# Patient Record
Sex: FEMALE | Race: ASIAN | NOT HISPANIC OR LATINO | Employment: PART TIME | URBAN - METROPOLITAN AREA
[De-identification: names, ages, dates, MRNs, and addresses within clinical notes are randomized per-mention and may not be internally consistent; named-entity substitution may affect disease eponyms.]

---

## 2016-01-29 LAB
HPV ABSTRACT: NORMAL
PAP-ABSTRACT: NORMAL

## 2017-01-04 ENCOUNTER — COMMUNICATION - HEALTHEAST (OUTPATIENT)
Dept: SURGERY | Facility: CLINIC | Age: 47
End: 2017-01-04

## 2017-01-05 ENCOUNTER — COMMUNICATION - HEALTHEAST (OUTPATIENT)
Dept: SURGERY | Facility: CLINIC | Age: 47
End: 2017-01-05

## 2017-01-05 DIAGNOSIS — G89.18 POST-OP PAIN: ICD-10-CM

## 2017-01-09 ENCOUNTER — OFFICE VISIT - HEALTHEAST (OUTPATIENT)
Dept: SURGERY | Facility: CLINIC | Age: 47
End: 2017-01-09

## 2017-01-09 ENCOUNTER — COMMUNICATION - HEALTHEAST (OUTPATIENT)
Dept: SURGERY | Facility: CLINIC | Age: 47
End: 2017-01-09

## 2017-01-09 DIAGNOSIS — Z48.89 POSTOPERATIVE VISIT: ICD-10-CM

## 2017-01-10 ENCOUNTER — HOSPITAL ENCOUNTER (OUTPATIENT)
Dept: CT IMAGING | Facility: HOSPITAL | Age: 47
Discharge: HOME | End: 2017-01-10
Attending: PHYSICIAN ASSISTANT

## 2017-01-22 ENCOUNTER — ANESTHESIA - HEALTHEAST (OUTPATIENT)
Dept: SURGERY | Facility: HOSPITAL | Age: 47
End: 2017-01-22

## 2017-01-22 ENCOUNTER — SURGERY - HEALTHEAST (OUTPATIENT)
Dept: SURGERY | Facility: HOSPITAL | Age: 47
End: 2017-01-22

## 2017-01-22 ASSESSMENT — MIFFLIN-ST. JEOR
SCORE: 1252.25
SCORE: 1245.17

## 2017-01-23 ENCOUNTER — HOME CARE/HOSPICE - HEALTHEAST (OUTPATIENT)
Dept: HOME HEALTH SERVICES | Facility: HOME HEALTH | Age: 47
End: 2017-01-23

## 2017-01-23 ENCOUNTER — COMMUNICATION - HEALTHEAST (OUTPATIENT)
Dept: FAMILY MEDICINE | Facility: CLINIC | Age: 47
End: 2017-01-23

## 2017-01-23 DIAGNOSIS — K29.70 GASTRITIS: ICD-10-CM

## 2017-01-23 ASSESSMENT — MIFFLIN-ST. JEOR: SCORE: 1279.92

## 2017-01-26 ASSESSMENT — MIFFLIN-ST. JEOR: SCORE: 1241.37

## 2017-01-29 ENCOUNTER — COMMUNICATION - HEALTHEAST (OUTPATIENT)
Dept: FAMILY MEDICINE | Facility: CLINIC | Age: 47
End: 2017-01-29

## 2017-01-30 ENCOUNTER — OFFICE VISIT - HEALTHEAST (OUTPATIENT)
Dept: SURGERY | Facility: CLINIC | Age: 47
End: 2017-01-30

## 2017-01-30 DIAGNOSIS — Z98.890 POST-OPERATIVE STATE: ICD-10-CM

## 2017-02-01 ENCOUNTER — COMMUNICATION - HEALTHEAST (OUTPATIENT)
Dept: FAMILY MEDICINE | Facility: CLINIC | Age: 47
End: 2017-02-01

## 2017-02-02 ENCOUNTER — COMMUNICATION - HEALTHEAST (OUTPATIENT)
Dept: SURGERY | Facility: CLINIC | Age: 47
End: 2017-02-02

## 2017-02-02 DIAGNOSIS — G89.18 POST-OPERATIVE PAIN: ICD-10-CM

## 2017-02-03 ENCOUNTER — OFFICE VISIT - HEALTHEAST (OUTPATIENT)
Dept: SURGERY | Facility: CLINIC | Age: 47
End: 2017-02-03

## 2017-02-03 DIAGNOSIS — Z98.890 POST-OPERATIVE STATE: ICD-10-CM

## 2017-02-03 DIAGNOSIS — N73.9 PELVIC ABSCESS IN FEMALE: ICD-10-CM

## 2017-02-09 ENCOUNTER — OFFICE VISIT - HEALTHEAST (OUTPATIENT)
Dept: FAMILY MEDICINE | Facility: CLINIC | Age: 47
End: 2017-02-09

## 2017-02-09 DIAGNOSIS — Z71.84 COUNSELING ABOUT TRAVEL: ICD-10-CM

## 2017-02-09 DIAGNOSIS — Z00.00 ROUTINE GENERAL MEDICAL EXAMINATION AT A HEALTH CARE FACILITY: ICD-10-CM

## 2017-02-09 DIAGNOSIS — E66.3 OVERWEIGHT: ICD-10-CM

## 2017-02-09 DIAGNOSIS — B18.1 CHRONIC HEPATITIS B VIRUS INFECTION (H): ICD-10-CM

## 2017-02-09 DIAGNOSIS — T81.40XA POSTOPERATIVE INFECTION, INITIAL ENCOUNTER: ICD-10-CM

## 2017-02-09 LAB
CHOLEST SERPL-MCNC: 138 MG/DL
CHOLEST SERPL-MCNC: 138 MG/DL
FASTING STATUS PATIENT QL REPORTED: YES
HDLC SERPL-MCNC: 43 MG/DL
HDLC SERPL-MCNC: 43 MG/DL
LDLC SERPL CALC-MCNC: 75 MG/DL
LDLC SERPL CALC-MCNC: 75 MG/DL
TRIGL SERPL-MCNC: 99 MG/DL
TRIGL SERPL-MCNC: 99 MG/DL

## 2017-02-09 ASSESSMENT — MIFFLIN-ST. JEOR: SCORE: 1212.56

## 2017-02-14 ENCOUNTER — AMBULATORY - HEALTHEAST (OUTPATIENT)
Dept: FAMILY MEDICINE | Facility: CLINIC | Age: 47
End: 2017-02-14

## 2017-02-14 ENCOUNTER — COMMUNICATION - HEALTHEAST (OUTPATIENT)
Dept: SCHEDULING | Facility: CLINIC | Age: 47
End: 2017-02-14

## 2017-02-14 DIAGNOSIS — Z00.00 HEALTH CARE MAINTENANCE: ICD-10-CM

## 2017-02-15 ENCOUNTER — COMMUNICATION - HEALTHEAST (OUTPATIENT)
Dept: FAMILY MEDICINE | Facility: CLINIC | Age: 47
End: 2017-02-15

## 2017-02-16 ENCOUNTER — COMMUNICATION - HEALTHEAST (OUTPATIENT)
Dept: SCHEDULING | Facility: CLINIC | Age: 47
End: 2017-02-16

## 2017-03-21 ENCOUNTER — RADIANT APPOINTMENT (OUTPATIENT)
Dept: MAMMOGRAPHY | Facility: CLINIC | Age: 47
End: 2017-03-21
Payer: COMMERCIAL

## 2017-03-21 DIAGNOSIS — Z12.31 VISIT FOR SCREENING MAMMOGRAM: ICD-10-CM

## 2017-03-21 PROCEDURE — G0202 SCR MAMMO BI INCL CAD: HCPCS | Mod: TC

## 2017-03-31 ENCOUNTER — RECORDS - HEALTHEAST (OUTPATIENT)
Dept: ADMINISTRATIVE | Facility: OTHER | Age: 47
End: 2017-03-31

## 2017-03-31 ENCOUNTER — TRANSFERRED RECORDS (OUTPATIENT)
Dept: HEALTH INFORMATION MANAGEMENT | Facility: CLINIC | Age: 47
End: 2017-03-31

## 2018-01-21 ENCOUNTER — HEALTH MAINTENANCE LETTER (OUTPATIENT)
Age: 48
End: 2018-01-21

## 2018-02-19 ENCOUNTER — OFFICE VISIT (OUTPATIENT)
Dept: PEDIATRICS | Facility: CLINIC | Age: 48
End: 2018-02-19
Payer: COMMERCIAL

## 2018-02-19 VITALS
TEMPERATURE: 97.9 F | SYSTOLIC BLOOD PRESSURE: 114 MMHG | HEART RATE: 62 BPM | OXYGEN SATURATION: 98 % | BODY MASS INDEX: 29.19 KG/M2 | DIASTOLIC BLOOD PRESSURE: 68 MMHG | HEIGHT: 62 IN | WEIGHT: 158.6 LBS

## 2018-02-19 DIAGNOSIS — E56.9 VITAMIN DEFICIENCY: ICD-10-CM

## 2018-02-19 DIAGNOSIS — E66.3 OVERWEIGHT (BMI 25.0-29.9): ICD-10-CM

## 2018-02-19 DIAGNOSIS — B18.1 CHRONIC HEPATITIS B (H): ICD-10-CM

## 2018-02-19 DIAGNOSIS — E55.9 VITAMIN D DEFICIENCY: ICD-10-CM

## 2018-02-19 DIAGNOSIS — Z12.31 VISIT FOR SCREENING MAMMOGRAM: ICD-10-CM

## 2018-02-19 DIAGNOSIS — Z00.00 ROUTINE GENERAL MEDICAL EXAMINATION AT A HEALTH CARE FACILITY: Primary | ICD-10-CM

## 2018-02-19 DIAGNOSIS — K64.4 EXTERNAL HEMORRHOIDS: ICD-10-CM

## 2018-02-19 DIAGNOSIS — K29.70 GASTRITIS WITHOUT BLEEDING, UNSPECIFIED CHRONICITY, UNSPECIFIED GASTRITIS TYPE: ICD-10-CM

## 2018-02-19 DIAGNOSIS — Z12.11 SCREENING FOR COLON CANCER: ICD-10-CM

## 2018-02-19 DIAGNOSIS — Z12.39 SCREENING FOR BREAST CANCER: ICD-10-CM

## 2018-02-19 DIAGNOSIS — R10.84 ABDOMINAL PAIN, GENERALIZED: ICD-10-CM

## 2018-02-19 DIAGNOSIS — Z86.39 HISTORY OF IRON DEFICIENCY: ICD-10-CM

## 2018-02-19 LAB
ALBUMIN SERPL-MCNC: 3.7 G/DL (ref 3.4–5)
ALP SERPL-CCNC: 65 U/L (ref 40–150)
ALT SERPL W P-5'-P-CCNC: 17 U/L (ref 0–50)
ANION GAP SERPL CALCULATED.3IONS-SCNC: 8 MMOL/L (ref 3–14)
AST SERPL W P-5'-P-CCNC: 16 U/L (ref 0–45)
BILIRUB SERPL-MCNC: 0.7 MG/DL (ref 0.2–1.3)
BUN SERPL-MCNC: 13 MG/DL (ref 7–30)
CALCIUM SERPL-MCNC: 9 MG/DL (ref 8.5–10.1)
CHLORIDE SERPL-SCNC: 108 MMOL/L (ref 94–109)
CHOLEST SERPL-MCNC: 188 MG/DL
CO2 SERPL-SCNC: 24 MMOL/L (ref 20–32)
CREAT SERPL-MCNC: 0.6 MG/DL (ref 0.52–1.04)
DEPRECATED CALCIDIOL+CALCIFEROL SERPL-MC: 13 UG/L (ref 20–75)
GFR SERPL CREATININE-BSD FRML MDRD: >90 ML/MIN/1.7M2
GLUCOSE SERPL-MCNC: 99 MG/DL (ref 70–99)
HDLC SERPL-MCNC: 61 MG/DL
LDLC SERPL CALC-MCNC: 110 MG/DL
NONHDLC SERPL-MCNC: 127 MG/DL
POTASSIUM SERPL-SCNC: 3.8 MMOL/L (ref 3.4–5.3)
PROT SERPL-MCNC: 7.3 G/DL (ref 6.8–8.8)
SODIUM SERPL-SCNC: 140 MMOL/L (ref 133–144)
TRIGL SERPL-MCNC: 84 MG/DL

## 2018-02-19 PROCEDURE — 80061 LIPID PANEL: CPT | Performed by: INTERNAL MEDICINE

## 2018-02-19 PROCEDURE — 99386 PREV VISIT NEW AGE 40-64: CPT | Performed by: INTERNAL MEDICINE

## 2018-02-19 PROCEDURE — 82306 VITAMIN D 25 HYDROXY: CPT | Performed by: INTERNAL MEDICINE

## 2018-02-19 PROCEDURE — 36415 COLL VENOUS BLD VENIPUNCTURE: CPT | Performed by: INTERNAL MEDICINE

## 2018-02-19 PROCEDURE — 80053 COMPREHEN METABOLIC PANEL: CPT | Performed by: INTERNAL MEDICINE

## 2018-02-19 RX ORDER — LORATADINE 10 MG/1
10 TABLET ORAL
Status: ON HOLD | COMMUNITY
End: 2018-04-03

## 2018-02-19 RX ORDER — ANTIPRURITIC (ANTI-ITCH) 1 G/100G
OINTMENT TOPICAL 2 TIMES DAILY PRN
Qty: 30 G | Refills: 1 | Status: SHIPPED | OUTPATIENT
Start: 2018-02-19 | End: 2018-02-19

## 2018-02-19 RX ORDER — HYDROCORTISONE 2.5 %
CREAM (GRAM) TOPICAL 2 TIMES DAILY
Status: ON HOLD | COMMUNITY
End: 2018-04-03

## 2018-02-19 NOTE — MR AVS SNAPSHOT
After Visit Summary   2/19/2018    Mirta Sims    MRN: [id number]           Patient Information     Date Of Birth          1970        Visit Information        Provider Department      2/19/2018 8:10 AM Nayan Holloway MD [facility]        Today's Diagnoses     Routine general medical examination at a health care facility    -  1    Screening for breast cancer        Screening for colon cancer        Vitamin deficiency        Overweight (BMI 25.0-29.9)        External hemorrhoids          Care Instructions    Nice to meet you!    Schedule mammogram after 3/31/2018    Schedule colonoscopy after 1970 (if everything is normal this is only once every 10 years!)    I will send your labs via MyChart or letter.      Preventive Health Recommendations  Female Ages 40 to 49    Yearly exam:     See your health care provider every year in order to  1. Review health changes.   2. Discuss preventive care.    3. Review your medicines if your doctor prescribed any.      Get a Pap test every three years (unless you have an abnormal result and your provider advises testing more often).      If you get Pap tests with HPV test, you only need to test every 5 years, unless you have an abnormal result. You do not need a Pap test if your uterus was removed (hysterectomy) and you have not had cancer.      You should be tested each year for STDs (sexually transmitted diseases), if you're at risk.       Ask your doctor if you should have a mammogram.      Have a colonoscopy (test for colon cancer) if someone in your family has had colon cancer or polyps before age 50.       Have a cholesterol test every 5 years.       Have a diabetes test (fasting glucose) after age 45. If you are at risk for diabetes, you should have this test every 3 years.    Shots: Get a flu shot each year. Get a tetanus shot every 10 years.     Nutrition:     Eat at least 5 servings of fruits and vegetables each day.    Eat  whole-grain bread, whole-wheat pasta and brown rice instead of white grains and rice.    Talk to your provider about Calcium and Vitamin D.     Lifestyle    Exercise at least 150 minutes a week (an average of 30 minutes a day, 5 days a week). This will help you control your weight and prevent disease.    Limit alcohol to one drink per day.    No smoking.     Wear sunscreen to prevent skin cancer.    See your dentist every six months for an exam and cleaning.          Follow-ups after your visit        Additional Services     GASTROENTEROLOGY ADULT REF PROCEDURE ONLY       Schedule after [birth year]/18/[birth year]    Last Lab Result: No results found for: CR  Body mass index is 29.48 kg/(m^2).     Needed:  No  Language:  English    Patient will be contacted to schedule procedure.     Please be aware that coverage of these services is subject to the terms and limitations of your health insurance plan.  Call member services at your health plan with any benefit or coverage questions.  Any procedures must be performed at a [place] facility OR coordinated by your clinic's referral office.    Please bring the following with you to your appointment:    (1) Any X-Rays, CTs or MRIs which have been performed.  Contact the facility where they were done to arrange for  prior to your scheduled appointment.    (2) List of current medications   (3) This referral request   (4) Any documents/labs given to you for this referral                  Follow-up notes from your care team     Return in about 1 year (around 2/19/2019) for Wellness Visit.      Future tests that were ordered for you today     Open Future Orders        Priority Expected Expires Ordered    MA Screening Digital Bilateral Routine  2/19/2019 2/19/2018            Who to contact     If you have questions or need follow up information about today's clinic visit or your schedule please contact [facility] [place] directly at [phone].  Normal or non-critical lab  "and imaging results will be communicated to you by MyChart, letter or phone within 4 business days after the clinic has received the results. If you do not hear from us within 7 days, please contact the clinic through Voxoundt or phone. If you have a critical or abnormal lab result, we will notify you by phone as soon as possible.  Submit refill requests through Orthohub or call your pharmacy and they will forward the refill request to us. Please allow 3 business days for your refill to be completed.          Additional Information About Your Visit        [employer] Information     Orth[employer]ub lets you send messages to your doctor, view your test results, renew your prescriptions, schedule appointments and more. To sign up, go to [email or web address] . Click on \"Log in\" on the left side of the screen, which will take you to the Welcome page. Then click on \"Sign up Now\" on the right side of the page.     You will be asked to enter the access code listed below, as well as some personal information. Please follow the directions to create your username and password.     Your access code is: [email or web address]  Expires: 2018  8:43 AM     Your access code will  in 90 days. If you need help or a new code, please call your [place] clinic or [phone].        Care EveryWhere ID     This is your Care EveryWhere ID. This could be used by other organizations to access your [place] medical records  [id number]        Your Vitals Were     Pulse Temperature Height Pulse Oximetry BMI (Body Mass Index)       62 97.9  F (36.6  C) (Oral) 5' 1.5\" (1.562 m) 98% 29.48 kg/m2        Blood Pressure from Last 3 Encounters:   18 114/68    Weight from Last 3 Encounters:   18 158 lb 9.6 oz (71.9 kg)              We Performed the Following     Comprehensive metabolic panel     GASTROENTEROLOGY ADULT REF PROCEDURE ONLY     Lipid panel reflex to direct LDL Fasting     Vitamin D Deficiency          Today's Medication Changes        "   These changes are accurate as of 2/19/18  8:44 AM.  If you have any questions, ask your nurse or doctor.               Start taking these medicines.        Dose/Directions    Hydrocortisone Acetate 1 % Oint   Used for:  External hemorrhoids   Started by:  Nayan Holloway MD        Externally apply topically 2 times daily as needed   Quantity:  30 g   Refills:  1            Where to get your medicines      These medications were sent to [place] [address] [facility] [place] - [address]  [address], [place] [address]     Phone:  [phone]     Hydrocortisone Acetate 1 % Oint                Primary Care Provider Office Phone # Fax #    Sarahsiena Ronna Grier [phone] [address] [place] [address]        Equal Access to Services     [facility]: Hadii silvia meier hadlissao Somarkie, waaxda luqadaha, qaybta kaalmada adeegyada, gege dos santos . So [facility] [phone].    ATENCIÓN: Si habla español, tiene a dowell disposición servicios gratuitos de asistencia lingüística. Llame al [phone].    We comply with applicable federal civil rights laws and [place] laws. We do not discriminate on the basis of race, color, national origin, age, disability, sex, sexual orientation, or gender identity.            Thank you!     Thank you for choosing [facility]  for your care. Our goal is always to provide you with excellent care. Hearing back from our patients is one way we can continue to improve our services. Please take a few minutes to complete the written survey that you may receive in the mail after your visit with us. Thank you!             Your Updated Medication List - Protect others around you: Learn how to safely use, store and throw away your medicines at www.disposemymeds.org.          This list is accurate as of 2/19/18  8:44 AM.  Always use your most recent med list.                   Brand Name Dispense Instructions for  use Diagnosis    hydrocortisone 2.5 % cream      2 times daily        Hydrocortisone Acetate 1 % Oint     30 g    Externally apply topically 2 times daily as needed    External hemorrhoids       loratadine 10 MG tablet    CLARITIN     Take 10 mg by mouth        omeprazole 20 MG CR capsule    priLOSEC     TAKE 1 CAPSULE (20 MG TOTAL) BY MOUTH DAILY.

## 2018-02-19 NOTE — PATIENT INSTRUCTIONS
Nice to meet you!    Schedule mammogram after 3/31/2018    Schedule colonoscopy after 1970 (if everything is normal this is only once every 10 years!)    I will send your labs via Selftradehart or letter.      Preventive Health Recommendations  Female Ages 40 to 49    Yearly exam:     See your health care provider every year in order to  1. Review health changes.   2. Discuss preventive care.    3. Review your medicines if your doctor prescribed any.      Get a Pap test every three years (unless you have an abnormal result and your provider advises testing more often).      If you get Pap tests with HPV test, you only need to test every 5 years, unless you have an abnormal result. You do not need a Pap test if your uterus was removed (hysterectomy) and you have not had cancer.      You should be tested each year for STDs (sexually transmitted diseases), if you're at risk.       Ask your doctor if you should have a mammogram.      Have a colonoscopy (test for colon cancer) if someone in your family has had colon cancer or polyps before age 50.       Have a cholesterol test every 5 years.       Have a diabetes test (fasting glucose) after age 45. If you are at risk for diabetes, you should have this test every 3 years.    Shots: Get a flu shot each year. Get a tetanus shot every 10 years.     Nutrition:     Eat at least 5 servings of fruits and vegetables each day.    Eat whole-grain bread, whole-wheat pasta and brown rice instead of white grains and rice.    Talk to your provider about Calcium and Vitamin D.     Lifestyle    Exercise at least 150 minutes a week (an average of 30 minutes a day, 5 days a week). This will help you control your weight and prevent disease.    Limit alcohol to one drink per day.    No smoking.     Wear sunscreen to prevent skin cancer.    See your dentist every six months for an exam and cleaning.

## 2018-02-19 NOTE — PROGRESS NOTES
SUBJECTIVE:   CC: Mirta Sims is an 47 year old woman who presents for preventive health visit.     Physical   Annual:     Getting at least 3 servings of Calcium per day::  Yes    Bi-annual eye exam::  Yes    Dental care twice a year::  Yes    Sleep apnea or symptoms of sleep apnea::  None    Diet::  Low salt and Gluten-free/reduced    Frequency of exercise::  2-3 days/week    Duration of exercise::  15-30 minutes    Taking medications regularly::  Yes    Medication side effects::  Not applicable    Additional concerns today::  No          # Hepatitis B  - Went to [facility] - was following every 2 years.    # Intermittent abdominal pain after eating  - Not daily  - No weight loss  - No stool changes    # Hemorrhoids  - Sometimes itch  - Uses steroid ointment a few days a month    # Gyn  Not had a period in 3 months  No hot flashes  No chance she could be pregnant (Essure)    Abstract: abstract Pap 1/29/16, Mammo 12/15/09-3/31/17 and Lipids 2/09/17     Chart Reviewed in CareEverywhere  # chronic hepatitis B  - Follows with [facility] per patient - will get NIMESH  - Hep B vial DNA positive in 2011    Other problems listed in chart:  Gastritis - Tums and Prilosec on med list  Chronic abdominal pain  Anemia   - post operatively anemic but in 2016 had normal Hgb  - history of iron deficiency anemia, saw Hematology, likely 2/2 heavy menstrual bleeding.   Carpal tunnel syndrome    Today's PHQ-2 Score:   PHQ-2 ( 1999 [employer]) 2/19/2018   Q1: Little interest or pleasure in doing things 1   Q2: Feeling down, depressed or hopeless 0   PHQ-2 Score 1   Q1: Little interest or pleasure in doing things Several days   Q2: Feeling down, depressed or hopeless Not at all   PHQ-2 Score 1     Abuse: Current or Past(Physical, Sexual or Emotional)- No  Do you feel safe in your environment - Yes    Social History   Substance Use Topics     Smoking status: Never Smoker     Smokeless tobacco: Never Used     Alcohol use Yes      Comment: occasional       Alcohol Use 2018   If you drink alcohol, do you typically have greater than 3 drinks per day OR greater than 7 drinks per week?   No     Reviewed orders with patient.  Reviewed health maintenance and updated orders accordingly - Yes  Patient Active Problem List   Diagnosis     Chronic hepatitis B (H)     Past Surgical History:   Procedure Laterality Date     APPENDECTOMY  2016     CHOLECYSTECTOMY       HC HYSTEROS W PERMANENT FALLOPAIN IMPLANT       LAPAROTOMY EXPLORATORY  2017    Procedure: LAPAROSCOPY CONVERTED TO EXPLORATORY LAPAROTOMY WITH EVACUATION OF RETROCOLONIC ABSCESS X 2; Surgeon: Johnnie Dinero DO; Location: [facility] OR; Service: General     MESH (IMPLANTABLE)  2017    Film Anti Adhesion Sepra 4301-02 - Sn.A. Abdomen       Social History   Substance Use Topics     Smoking status: Never Smoker     Smokeless tobacco: Never Used     Alcohol use Yes      Comment: occasional      Family History   Problem Relation Age of Onset     Gout Mother      Hypertension Mother      92 in 2018     Other - See Comments Father       2 years ago, in [place], fever?     Other - See Comments Brother      Heart transplant         Current Outpatient Prescriptions   Medication Sig Dispense Refill     omeprazole (PRILOSEC) 20 MG CR capsule TAKE 1 CAPSULE (20 MG TOTAL) BY MOUTH DAILY.       loratadine (CLARITIN) 10 MG tablet Take 10 mg by mouth       hydrocortisone 2.5 % cream 2 times daily       hydrocortisone (ANUSOL-HC) 2.5 % cream Place rectally 2 times daily 30 g 1     Allergies   Allergen Reactions     Strawberry Swelling     Tomato Swelling     Azithromycin Rash     Annotation: on face         Patient over age 50, mutual decision to screen reflected in health maintenance.    Pertinent mammograms are reviewed under the imaging tab.  History of abnormal Pap smear: NO - age 30-65 PAP every 5 years with negative HPV co-testing recommended    Reviewed and updated as needed this visit by  "clinical staff  Tobacco  Allergies  Meds  Problems  Med Hx  Surg Hx  Fam Hx  Soc Hx          Reviewed and updated as needed this visit by Provider  Allergies  Meds  Problems        Past Medical History:   Diagnosis Date     Chronic hepatitis B (H)      Perforated appendicitis      Post-operative wound abscess       Past Surgical History:   Procedure Laterality Date     APPENDECTOMY  12/26/2016     CHOLECYSTECTOMY       HC HYSTEROS W PERMANENT FALLOPAIN IMPLANT       LAPAROTOMY EXPLORATORY  01/22/2017    Procedure: LAPAROSCOPY CONVERTED TO EXPLORATORY LAPAROTOMY WITH EVACUATION OF RETROCOLONIC ABSCESS X 2; Surgeon: Johnnie Dinero DO; [facility] Service: General     MESH (IMPLANTABLE)  01/22/2017    Film Anti Adhesion Sepra 4301-02 - Sn.A. Abdomen       Review of Systems  C: NEGATIVE for fever, chills, change in weight  I: NEGATIVE for worrisome rashes, moles or lesions  E: NEGATIVE for vision changes or irritation  ENT: NEGATIVE for ear, mouth and throat problems  R: NEGATIVE for significant cough or SOB  B: NEGATIVE for masses, tenderness or discharge  CV: NEGATIVE for chest pain, palpitations or peripheral edema  GI: NEGATIVE for nausea, abdominal pain, heartburn, or change in bowel habits  : NEGATIVE for unusual urinary or vaginal symptoms. No vaginal bleeding.  M: NEGATIVE for significant arthralgias or myalgia  N: NEGATIVE for weakness, dizziness or paresthesias  P: NEGATIVE for changes in mood or affect      OBJECTIVE:   /68 (BP Location: Right arm, Patient Position: Sitting, Cuff Size: Adult Regular)  Pulse 62  Temp 97.9  F (36.6  C) (Oral)  Ht 5' 1.5\" (1.562 m)  Wt 158 lb 9.6 oz (71.9 kg)  SpO2 98%  BMI 29.48 kg/m2  Physical Exam  GENERAL: healthy, alert and no distress, overweight  EYES: Eyes grossly normal to inspection, PERRL and conjunctivae and sclerae normal  HENT: ear canals and TM's normal, nose and mouth without ulcers or lesions  NECK: no adenopathy, no " asymmetry, masses, or scars and thyroid normal to palpation  RESP: lungs clear to auscultation - no rales, rhonchi or wheezes  BREAST: normal without masses, tenderness or nipple discharge and no palpable axillary masses or adenopathy  CV: regular rate and rhythm, normal S1 S2, no S3 or S4, no murmur, click or rub, no peripheral edema and peripheral pulses strong  ABDOMEN: soft, nontender, no hepatosplenomegaly, no masses and bowel sounds normal, well healed abdominal scars  MS: no gross musculoskeletal defects noted, no edema  SKIN: no suspicious lesions or rashes  NEURO: Normal strength and tone, mentation intact and speech normal  PSYCH: mentation appears normal, affect normal/bright    ASSESSMENT/PLAN:   1. Routine general medical examination at a health care facility  Overall doing well.  Here to establish care with me.  See discussion below.    2. Screening for breast cancer  Has been doing annual screenings.  No abnormals prior. Will have next mammogram done after 3/31/2018.  - MA Screening Digital Bilateral; Future    3. Screening for colon cancer  Reviewed screening guidelines.  She turns 48 in a few months so we will have her complete this after her 48th birthday.  - GASTROENTEROLOGY ADULT REF PROCEDURE ONLY    4. Vitamin deficiency  Discussed bone health.  - Vitamin D Deficiency    5. Overweight (BMI 25.0-29.9)  Reviewed diet and exercise.  She exercises a few times a week in her apartment complex.  Recommended that she get her heart rate of 3-4 times a week.  Also discussed diet.  She eats a significant amount of rice.  Discussed changing all white rice to brown rice.  - Comprehensive metabolic panel  - Lipid panel reflex to direct LDL Fasting    6. External hemorrhoids  Not currently active but does use hydrocortisone cream a few days a month when she has itching.  - hydrocortisone (ANUSOL-HC) 2.5 % cream; Place rectally 2 times daily  Dispense: 30 g; Refill: 1    7. Chronic hepatitis B (H)  Has been  "following with [facility].  She is not sure when she is due to follow-up with them.  She felt an NIMEHS.  I will review her records we will get back to her regarding follow-up plans.    8. Abdominal pain, generalized  May be related to her history of gastritis.  She does report he gets better with Tums.  Encouraged her to keep a journal of when the pain occurs, where it is, in which she is eating.  I invited her to come back to discuss further.    9. History of iron deficiency anemia  Per chart review- saw Heme in the past. Thought 2/2 heavy menses. In 2016 had normal Hgb, more recent values are post-operative. No complaints today - will recheck CBC with next blood draw.    COUNSELING:  Reviewed preventive health counseling, as reflected in patient instructions       Regular exercise       Healthy diet/nutrition       Immunizations - UTD       Osteoporosis Prevention/Bone Health       Colon cancer screening       HIV screeninx in teen years, 1x in adult years, and at intervals if high risk - NEGATIVE 12/3/2010       (Summer)menopause management     reports that she has never smoked. She has never used smokeless tobacco.    Estimated body mass index is 29.48 kg/(m^2) as calculated from the following:    Height as of this encounter: 5' 1.5\" (1.562 m).    Weight as of this encounter: 158 lb 9.6 oz (71.9 kg).   Weight management plan: Discussed healthy diet and exercise guidelines and patient will follow up in 12 months in clinic to re-evaluate.    Counseling Resources:  ATP IV Guidelines  Pooled Cohorts Equation Calculator  Breast Cancer Risk Calculator  FRAX Risk Assessment  ICSI Preventive Guidelines  Dietary Guidelines for Americans, 2010  USDA's MyPlate  ASA Prophylaxis  Lung CA Screening    Nayan Holloway MD  [facility] [place]  "

## 2018-02-20 RX ORDER — ERGOCALCIFEROL 1.25 MG/1
50000 CAPSULE, LIQUID FILLED ORAL
Qty: 8 CAPSULE | Refills: 0 | Status: SHIPPED | OUTPATIENT
Start: 2018-02-20 | End: 2019-02-22

## 2018-02-21 ENCOUNTER — TELEPHONE (OUTPATIENT)
Dept: PEDIATRICS | Facility: CLINIC | Age: 48
End: 2018-02-21

## 2018-02-21 NOTE — TELEPHONE ENCOUNTER
Patient calls.  She is advised and in agreement.  She will call them for an appointment in the spring when she turns 48.  Lucila Ovalle RN  Message handled by Nurse Triage.

## 2018-02-21 NOTE — TELEPHONE ENCOUNTER
Called patient per provider, was unable to speak with patient. Left VM to call the clinic back.     Vivienne Newsome CMA  February 21, 2018, 9:20 AM

## 2018-04-03 ENCOUNTER — RECORDS - HEALTHEAST (OUTPATIENT)
Dept: ADMINISTRATIVE | Facility: OTHER | Age: 48
End: 2018-04-03

## 2018-04-03 ENCOUNTER — APPOINTMENT (OUTPATIENT)
Dept: CT IMAGING | Facility: CLINIC | Age: 48
End: 2018-04-03
Attending: INTERNAL MEDICINE
Payer: COMMERCIAL

## 2018-04-03 ENCOUNTER — SURGERY (OUTPATIENT)
Age: 48
End: 2018-04-03
Payer: COMMERCIAL

## 2018-04-03 ENCOUNTER — ANESTHESIA EVENT (OUTPATIENT)
Dept: SURGERY | Facility: CLINIC | Age: 48
End: 2018-04-03
Payer: COMMERCIAL

## 2018-04-03 ENCOUNTER — HOSPITAL ENCOUNTER (INPATIENT)
Facility: CLINIC | Age: 48
LOS: 2 days | Discharge: HOME | End: 2018-04-05
Attending: INTERNAL MEDICINE | Admitting: SURGERY
Payer: COMMERCIAL

## 2018-04-03 ENCOUNTER — ANESTHESIA (OUTPATIENT)
Dept: SURGERY | Facility: CLINIC | Age: 48
End: 2018-04-03
Payer: COMMERCIAL

## 2018-04-03 DIAGNOSIS — Z90.49 S/P LAPAROSCOPIC APPENDECTOMY: Primary | ICD-10-CM

## 2018-04-03 DIAGNOSIS — K35.30 ACUTE APPENDICITIS WITH LOCALIZED PERITONITIS: ICD-10-CM

## 2018-04-03 PROBLEM — K35.33 ACUTE APPENDICITIS WITH PERITONEAL ABSCESS: Status: ACTIVE | Noted: 2018-04-03

## 2018-04-03 PROBLEM — K35.80 ACUTE APPENDICITIS: Status: ACTIVE | Noted: 2018-04-03

## 2018-04-03 LAB
ALBUMIN SERPL-MCNC: 3.5 G/DL (ref 3.4–5)
ALBUMIN UR-MCNC: NEGATIVE MG/DL
ALP SERPL-CCNC: 60 U/L (ref 40–150)
ALT SERPL W P-5'-P-CCNC: 16 U/L (ref 0–50)
ANION GAP SERPL CALCULATED.3IONS-SCNC: 5 MMOL/L (ref 3–14)
APPEARANCE UR: CLEAR
AST SERPL W P-5'-P-CCNC: 16 U/L (ref 0–45)
BASOPHILS # BLD AUTO: 0 10E9/L (ref 0–0.2)
BASOPHILS NFR BLD AUTO: 0.2 %
BILIRUB SERPL-MCNC: 1.5 MG/DL (ref 0.2–1.3)
BILIRUB UR QL STRIP: NEGATIVE
BUN SERPL-MCNC: 7 MG/DL (ref 7–30)
CALCIUM SERPL-MCNC: 8.5 MG/DL (ref 8.5–10.1)
CHLORIDE SERPL-SCNC: 105 MMOL/L (ref 94–109)
CO2 SERPL-SCNC: 26 MMOL/L (ref 20–32)
COLOR UR AUTO: YELLOW
CREAT SERPL-MCNC: 0.56 MG/DL (ref 0.52–1.04)
DIFFERENTIAL METHOD BLD: ABNORMAL
EOSINOPHIL # BLD AUTO: 0 10E9/L (ref 0–0.7)
EOSINOPHIL NFR BLD AUTO: 0.2 %
ERYTHROCYTE [DISTWIDTH] IN BLOOD BY AUTOMATED COUNT: 16.3 % (ref 10–15)
GFR SERPL CREATININE-BSD FRML MDRD: >90 ML/MIN/1.7M2
GLUCOSE SERPL-MCNC: 105 MG/DL (ref 70–99)
GLUCOSE UR STRIP-MCNC: NEGATIVE MG/DL
HCG UR QL: NEGATIVE
HCT VFR BLD AUTO: 40 % (ref 35–47)
HGB BLD-MCNC: 13 G/DL (ref 11.7–15.7)
HGB UR QL STRIP: NEGATIVE
HYALINE CASTS #/AREA URNS LPF: 1 /LPF (ref 0–2)
IMM GRANULOCYTES # BLD: 0.1 10E9/L (ref 0–0.4)
IMM GRANULOCYTES NFR BLD: 0.4 %
KETONES UR STRIP-MCNC: 20 MG/DL
LEUKOCYTE ESTERASE UR QL STRIP: NEGATIVE
LIPASE SERPL-CCNC: 210 U/L (ref 73–393)
LYMPHOCYTES # BLD AUTO: 1.4 10E9/L (ref 0.8–5.3)
LYMPHOCYTES NFR BLD AUTO: 10 %
MCH RBC QN AUTO: 26.3 PG (ref 26.5–33)
MCHC RBC AUTO-ENTMCNC: 32.5 G/DL (ref 31.5–36.5)
MCV RBC AUTO: 81 FL (ref 78–100)
MONOCYTES # BLD AUTO: 1.1 10E9/L (ref 0–1.3)
MONOCYTES NFR BLD AUTO: 8.1 %
NEUTROPHILS # BLD AUTO: 11.2 10E9/L (ref 1.6–8.3)
NEUTROPHILS NFR BLD AUTO: 81.1 %
NITRATE UR QL: NEGATIVE
NRBC # BLD AUTO: 0 10*3/UL
NRBC BLD AUTO-RTO: 0 /100
PH UR STRIP: 8 PH (ref 5–7)
PLATELET # BLD AUTO: 136 10E9/L (ref 150–450)
POTASSIUM SERPL-SCNC: 3.7 MMOL/L (ref 3.4–5.3)
PROT SERPL-MCNC: 7.2 G/DL (ref 6.8–8.8)
RBC # BLD AUTO: 4.94 10E12/L (ref 3.8–5.2)
RBC #/AREA URNS AUTO: <1 /HPF (ref 0–2)
SODIUM SERPL-SCNC: 136 MMOL/L (ref 133–144)
SOURCE: ABNORMAL
SP GR UR STRIP: 1.01 (ref 1–1.03)
SQUAMOUS #/AREA URNS AUTO: 1 /HPF (ref 0–1)
UROBILINOGEN UR STRIP-MCNC: 4 MG/DL (ref 0–2)
WBC # BLD AUTO: 13.8 10E9/L (ref 4–11)
WBC #/AREA URNS AUTO: <1 /HPF (ref 0–5)

## 2018-04-03 PROCEDURE — 36000056 ZZH SURGERY LEVEL 3 1ST 30 MIN: Performed by: SURGERY

## 2018-04-03 PROCEDURE — 12000000 ZZH R&B MED SURG/OB

## 2018-04-03 PROCEDURE — 25000566 ZZH SEVOFLURANE, EA 15 MIN: Performed by: SURGERY

## 2018-04-03 PROCEDURE — 96376 TX/PRO/DX INJ SAME DRUG ADON: CPT

## 2018-04-03 PROCEDURE — 88304 TISSUE EXAM BY PATHOLOGIST: CPT | Mod: 26 | Performed by: SURGERY

## 2018-04-03 PROCEDURE — 81025 URINE PREGNANCY TEST: CPT | Performed by: INTERNAL MEDICINE

## 2018-04-03 PROCEDURE — 96375 TX/PRO/DX INJ NEW DRUG ADDON: CPT

## 2018-04-03 PROCEDURE — 71000012 ZZH RECOVERY PHASE 1 LEVEL 1 FIRST HR: Performed by: SURGERY

## 2018-04-03 PROCEDURE — 0DTJ4ZZ RESECTION OF APPENDIX, PERCUTANEOUS ENDOSCOPIC APPROACH: ICD-10-PCS | Performed by: SURGERY

## 2018-04-03 PROCEDURE — 88304 TISSUE EXAM BY PATHOLOGIST: CPT | Performed by: SURGERY

## 2018-04-03 PROCEDURE — 40000936 ZZH STATISTIC OUTPATIENT (NON-OBS) NIGHT

## 2018-04-03 PROCEDURE — 25000128 H RX IP 250 OP 636: Performed by: INTERNAL MEDICINE

## 2018-04-03 PROCEDURE — 44970 LAPAROSCOPY APPENDECTOMY: CPT | Performed by: SURGERY

## 2018-04-03 PROCEDURE — 25000132 ZZH RX MED GY IP 250 OP 250 PS 637: Performed by: SURGERY

## 2018-04-03 PROCEDURE — 25000125 ZZHC RX 250: Performed by: NURSE ANESTHETIST, CERTIFIED REGISTERED

## 2018-04-03 PROCEDURE — 85025 COMPLETE CBC W/AUTO DIFF WBC: CPT | Performed by: INTERNAL MEDICINE

## 2018-04-03 PROCEDURE — 25000125 ZZHC RX 250: Performed by: SURGERY

## 2018-04-03 PROCEDURE — 40000935 ZZH STATISTIC OUTPATIENT (NON-OBS) EVE

## 2018-04-03 PROCEDURE — 25800025 ZZH RX 258: Performed by: SURGERY

## 2018-04-03 PROCEDURE — 40000306 ZZH STATISTIC PRE PROC ASSESS II: Performed by: SURGERY

## 2018-04-03 PROCEDURE — 80053 COMPREHEN METABOLIC PANEL: CPT | Performed by: INTERNAL MEDICINE

## 2018-04-03 PROCEDURE — 99221 1ST HOSP IP/OBS SF/LOW 40: CPT | Mod: 57 | Performed by: SURGERY

## 2018-04-03 PROCEDURE — 25000128 H RX IP 250 OP 636: Performed by: ANESTHESIOLOGY

## 2018-04-03 PROCEDURE — 74176 CT ABD & PELVIS W/O CONTRAST: CPT

## 2018-04-03 PROCEDURE — 25000128 H RX IP 250 OP 636: Performed by: SURGERY

## 2018-04-03 PROCEDURE — 81001 URINALYSIS AUTO W/SCOPE: CPT | Performed by: INTERNAL MEDICINE

## 2018-04-03 PROCEDURE — 83690 ASSAY OF LIPASE: CPT | Performed by: INTERNAL MEDICINE

## 2018-04-03 PROCEDURE — 99285 EMERGENCY DEPT VISIT HI MDM: CPT | Mod: 25

## 2018-04-03 PROCEDURE — 0W9H40Z DRAINAGE OF RETROPERITONEUM WITH DRAINAGE DEVICE, PERCUTANEOUS ENDOSCOPIC APPROACH: ICD-10-PCS | Performed by: SURGERY

## 2018-04-03 PROCEDURE — 27210794 ZZH OR GENERAL SUPPLY STERILE: Performed by: SURGERY

## 2018-04-03 PROCEDURE — 71000013 ZZH RECOVERY PHASE 1 LEVEL 1 EA ADDTL HR: Performed by: SURGERY

## 2018-04-03 PROCEDURE — 25000128 H RX IP 250 OP 636: Performed by: NURSE ANESTHETIST, CERTIFIED REGISTERED

## 2018-04-03 PROCEDURE — 37000008 ZZH ANESTHESIA TECHNICAL FEE, 1ST 30 MIN: Performed by: SURGERY

## 2018-04-03 PROCEDURE — 36000058 ZZH SURGERY LEVEL 3 EA 15 ADDTL MIN: Performed by: SURGERY

## 2018-04-03 PROCEDURE — 96365 THER/PROPH/DIAG IV INF INIT: CPT

## 2018-04-03 PROCEDURE — 40000934 ZZH STATISTIC OUTPATIENT (NON-OBS) DAY

## 2018-04-03 PROCEDURE — 44970 LAPAROSCOPY APPENDECTOMY: CPT | Mod: AS | Performed by: PHYSICIAN ASSISTANT

## 2018-04-03 PROCEDURE — 37000009 ZZH ANESTHESIA TECHNICAL FEE, EACH ADDTL 15 MIN: Performed by: SURGERY

## 2018-04-03 RX ORDER — GLYCOPYRROLATE 0.2 MG/ML
INJECTION, SOLUTION INTRAMUSCULAR; INTRAVENOUS PRN
Status: DISCONTINUED | OUTPATIENT
Start: 2018-04-03 | End: 2018-04-03

## 2018-04-03 RX ORDER — FENTANYL CITRATE 50 UG/ML
INJECTION, SOLUTION INTRAMUSCULAR; INTRAVENOUS PRN
Status: DISCONTINUED | OUTPATIENT
Start: 2018-04-03 | End: 2018-04-03

## 2018-04-03 RX ORDER — NALOXONE HYDROCHLORIDE 0.4 MG/ML
.1-.4 INJECTION, SOLUTION INTRAMUSCULAR; INTRAVENOUS; SUBCUTANEOUS
Status: DISCONTINUED | OUTPATIENT
Start: 2018-04-03 | End: 2018-04-05 | Stop reason: HOSPADM

## 2018-04-03 RX ORDER — BUPIVACAINE HYDROCHLORIDE 5 MG/ML
INJECTION, SOLUTION EPIDURAL; INTRACAUDAL PRN
Status: DISCONTINUED | OUTPATIENT
Start: 2018-04-03 | End: 2018-04-03 | Stop reason: HOSPADM

## 2018-04-03 RX ORDER — PROPOFOL 10 MG/ML
INJECTION, EMULSION INTRAVENOUS PRN
Status: DISCONTINUED | OUTPATIENT
Start: 2018-04-03 | End: 2018-04-03

## 2018-04-03 RX ORDER — ONDANSETRON 4 MG/1
4 TABLET, ORALLY DISINTEGRATING ORAL EVERY 6 HOURS PRN
Status: DISCONTINUED | OUTPATIENT
Start: 2018-04-03 | End: 2018-04-05 | Stop reason: HOSPADM

## 2018-04-03 RX ORDER — MEPERIDINE HYDROCHLORIDE 50 MG/ML
12.5 INJECTION INTRAMUSCULAR; INTRAVENOUS; SUBCUTANEOUS
Status: DISCONTINUED | OUTPATIENT
Start: 2018-04-03 | End: 2018-04-03 | Stop reason: HOSPADM

## 2018-04-03 RX ORDER — SODIUM CHLORIDE, SODIUM LACTATE, POTASSIUM CHLORIDE, CALCIUM CHLORIDE 600; 310; 30; 20 MG/100ML; MG/100ML; MG/100ML; MG/100ML
INJECTION, SOLUTION INTRAVENOUS CONTINUOUS
Status: DISCONTINUED | OUTPATIENT
Start: 2018-04-03 | End: 2018-04-03 | Stop reason: HOSPADM

## 2018-04-03 RX ORDER — ONDANSETRON 2 MG/ML
4 INJECTION INTRAMUSCULAR; INTRAVENOUS EVERY 30 MIN PRN
Status: COMPLETED | OUTPATIENT
Start: 2018-04-03 | End: 2018-04-03

## 2018-04-03 RX ORDER — ONDANSETRON 2 MG/ML
4 INJECTION INTRAMUSCULAR; INTRAVENOUS EVERY 6 HOURS PRN
Status: DISCONTINUED | OUTPATIENT
Start: 2018-04-03 | End: 2018-04-05 | Stop reason: HOSPADM

## 2018-04-03 RX ORDER — LIDOCAINE 40 MG/G
CREAM TOPICAL
Status: DISCONTINUED | OUTPATIENT
Start: 2018-04-03 | End: 2018-04-05 | Stop reason: HOSPADM

## 2018-04-03 RX ORDER — ONDANSETRON 2 MG/ML
4 INJECTION INTRAMUSCULAR; INTRAVENOUS EVERY 30 MIN PRN
Status: DISCONTINUED | OUTPATIENT
Start: 2018-04-03 | End: 2018-04-03 | Stop reason: HOSPADM

## 2018-04-03 RX ORDER — KETOROLAC TROMETHAMINE 15 MG/ML
15 INJECTION, SOLUTION INTRAMUSCULAR; INTRAVENOUS EVERY 6 HOURS PRN
Status: DISCONTINUED | OUTPATIENT
Start: 2018-04-03 | End: 2018-04-05 | Stop reason: HOSPADM

## 2018-04-03 RX ORDER — DEXAMETHASONE SODIUM PHOSPHATE 4 MG/ML
INJECTION, SOLUTION INTRA-ARTICULAR; INTRALESIONAL; INTRAMUSCULAR; INTRAVENOUS; SOFT TISSUE PRN
Status: DISCONTINUED | OUTPATIENT
Start: 2018-04-03 | End: 2018-04-03

## 2018-04-03 RX ORDER — PIPERACILLIN SODIUM, TAZOBACTAM SODIUM 3; .375 G/15ML; G/15ML
3.38 INJECTION, POWDER, LYOPHILIZED, FOR SOLUTION INTRAVENOUS EVERY 6 HOURS
Status: DISCONTINUED | OUTPATIENT
Start: 2018-04-03 | End: 2018-04-05 | Stop reason: HOSPADM

## 2018-04-03 RX ORDER — ONDANSETRON 4 MG/1
4 TABLET, ORALLY DISINTEGRATING ORAL EVERY 6 HOURS PRN
Status: DISCONTINUED | OUTPATIENT
Start: 2018-04-03 | End: 2018-04-05

## 2018-04-03 RX ORDER — HYDROMORPHONE HYDROCHLORIDE 1 MG/ML
.3-.5 INJECTION, SOLUTION INTRAMUSCULAR; INTRAVENOUS; SUBCUTANEOUS
Status: DISCONTINUED | OUTPATIENT
Start: 2018-04-03 | End: 2018-04-05 | Stop reason: HOSPADM

## 2018-04-03 RX ORDER — ONDANSETRON 2 MG/ML
4 INJECTION INTRAMUSCULAR; INTRAVENOUS EVERY 6 HOURS PRN
Status: DISCONTINUED | OUTPATIENT
Start: 2018-04-03 | End: 2018-04-05

## 2018-04-03 RX ORDER — FENTANYL CITRATE 50 UG/ML
25-50 INJECTION, SOLUTION INTRAMUSCULAR; INTRAVENOUS
Status: DISCONTINUED | OUTPATIENT
Start: 2018-04-03 | End: 2018-04-03 | Stop reason: HOSPADM

## 2018-04-03 RX ORDER — METOPROLOL TARTRATE 1 MG/ML
1-2 INJECTION, SOLUTION INTRAVENOUS EVERY 5 MIN PRN
Status: DISCONTINUED | OUTPATIENT
Start: 2018-04-03 | End: 2018-04-03 | Stop reason: HOSPADM

## 2018-04-03 RX ORDER — PROCHLORPERAZINE MALEATE 5 MG
10 TABLET ORAL EVERY 6 HOURS PRN
Status: DISCONTINUED | OUTPATIENT
Start: 2018-04-03 | End: 2018-04-05 | Stop reason: HOSPADM

## 2018-04-03 RX ORDER — ALBUTEROL SULFATE 0.83 MG/ML
2.5 SOLUTION RESPIRATORY (INHALATION) EVERY 4 HOURS PRN
Status: DISCONTINUED | OUTPATIENT
Start: 2018-04-03 | End: 2018-04-03 | Stop reason: HOSPADM

## 2018-04-03 RX ORDER — LIDOCAINE HYDROCHLORIDE 10 MG/ML
INJECTION, SOLUTION INFILTRATION; PERINEURAL PRN
Status: DISCONTINUED | OUTPATIENT
Start: 2018-04-03 | End: 2018-04-03

## 2018-04-03 RX ORDER — CEFAZOLIN SODIUM 1 G/3ML
1 INJECTION, POWDER, FOR SOLUTION INTRAMUSCULAR; INTRAVENOUS SEE ADMIN INSTRUCTIONS
Status: DISCONTINUED | OUTPATIENT
Start: 2018-04-03 | End: 2018-04-03 | Stop reason: HOSPADM

## 2018-04-03 RX ORDER — PROPOFOL 10 MG/ML
INJECTION, EMULSION INTRAVENOUS CONTINUOUS PRN
Status: DISCONTINUED | OUTPATIENT
Start: 2018-04-03 | End: 2018-04-03

## 2018-04-03 RX ORDER — HYDROMORPHONE HYDROCHLORIDE 1 MG/ML
0.5 INJECTION, SOLUTION INTRAMUSCULAR; INTRAVENOUS; SUBCUTANEOUS
Status: COMPLETED | OUTPATIENT
Start: 2018-04-03 | End: 2018-04-03

## 2018-04-03 RX ORDER — ACETAMINOPHEN 325 MG/1
650 TABLET ORAL EVERY 4 HOURS PRN
Status: DISCONTINUED | OUTPATIENT
Start: 2018-04-06 | End: 2018-04-05 | Stop reason: HOSPADM

## 2018-04-03 RX ORDER — SODIUM CHLORIDE, SODIUM LACTATE, POTASSIUM CHLORIDE, CALCIUM CHLORIDE 600; 310; 30; 20 MG/100ML; MG/100ML; MG/100ML; MG/100ML
INJECTION, SOLUTION INTRAVENOUS CONTINUOUS
Status: DISCONTINUED | OUTPATIENT
Start: 2018-04-03 | End: 2018-04-04

## 2018-04-03 RX ORDER — PROMETHAZINE HYDROCHLORIDE 25 MG/ML
6.25 INJECTION, SOLUTION INTRAMUSCULAR; INTRAVENOUS
Status: DISCONTINUED | OUTPATIENT
Start: 2018-04-03 | End: 2018-04-03 | Stop reason: HOSPADM

## 2018-04-03 RX ORDER — LIDOCAINE 40 MG/G
CREAM TOPICAL
Status: DISCONTINUED | OUTPATIENT
Start: 2018-04-03 | End: 2018-04-03 | Stop reason: HOSPADM

## 2018-04-03 RX ORDER — FENTANYL CITRATE 50 UG/ML
25-50 INJECTION, SOLUTION INTRAMUSCULAR; INTRAVENOUS
Status: DISCONTINUED | OUTPATIENT
Start: 2018-04-03 | End: 2018-04-05

## 2018-04-03 RX ORDER — LORATADINE 10 MG/1
10 TABLET ORAL DAILY PRN
COMMUNITY

## 2018-04-03 RX ORDER — AMPICILLIN AND SULBACTAM 2; 1 G/1; G/1
3 INJECTION, POWDER, FOR SOLUTION INTRAMUSCULAR; INTRAVENOUS ONCE
Status: COMPLETED | OUTPATIENT
Start: 2018-04-03 | End: 2018-04-03

## 2018-04-03 RX ORDER — HYDROMORPHONE HYDROCHLORIDE 1 MG/ML
.3-.5 INJECTION, SOLUTION INTRAMUSCULAR; INTRAVENOUS; SUBCUTANEOUS EVERY 10 MIN PRN
Status: DISCONTINUED | OUTPATIENT
Start: 2018-04-03 | End: 2018-04-03 | Stop reason: HOSPADM

## 2018-04-03 RX ORDER — ACETAMINOPHEN 325 MG/1
975 TABLET ORAL EVERY 8 HOURS
Status: DISCONTINUED | OUTPATIENT
Start: 2018-04-03 | End: 2018-04-05 | Stop reason: HOSPADM

## 2018-04-03 RX ORDER — OXYCODONE HYDROCHLORIDE 5 MG/1
5-10 TABLET ORAL
Status: DISCONTINUED | OUTPATIENT
Start: 2018-04-03 | End: 2018-04-05 | Stop reason: HOSPADM

## 2018-04-03 RX ORDER — NALOXONE HYDROCHLORIDE 0.4 MG/ML
.1-.4 INJECTION, SOLUTION INTRAMUSCULAR; INTRAVENOUS; SUBCUTANEOUS
Status: DISCONTINUED | OUTPATIENT
Start: 2018-04-03 | End: 2018-04-03 | Stop reason: HOSPADM

## 2018-04-03 RX ORDER — HYDROMORPHONE HYDROCHLORIDE 1 MG/ML
.3-.5 INJECTION, SOLUTION INTRAMUSCULAR; INTRAVENOUS; SUBCUTANEOUS
Status: DISCONTINUED | OUTPATIENT
Start: 2018-04-03 | End: 2018-04-05 | Stop reason: DRUGHIGH

## 2018-04-03 RX ORDER — CEFAZOLIN SODIUM 2 G/100ML
2 INJECTION, SOLUTION INTRAVENOUS
Status: COMPLETED | OUTPATIENT
Start: 2018-04-03 | End: 2018-04-03

## 2018-04-03 RX ORDER — NEOSTIGMINE METHYLSULFATE 1 MG/ML
VIAL (ML) INJECTION PRN
Status: DISCONTINUED | OUTPATIENT
Start: 2018-04-03 | End: 2018-04-03

## 2018-04-03 RX ORDER — ONDANSETRON 4 MG/1
4 TABLET, ORALLY DISINTEGRATING ORAL EVERY 30 MIN PRN
Status: DISCONTINUED | OUTPATIENT
Start: 2018-04-03 | End: 2018-04-03 | Stop reason: HOSPADM

## 2018-04-03 RX ADMIN — Medication 1 LOZENGE: at 14:38

## 2018-04-03 RX ADMIN — ONDANSETRON 4 MG: 2 INJECTION INTRAMUSCULAR; INTRAVENOUS at 02:46

## 2018-04-03 RX ADMIN — FENTANYL CITRATE 50 MCG: 50 INJECTION, SOLUTION INTRAMUSCULAR; INTRAVENOUS at 10:42

## 2018-04-03 RX ADMIN — ONDANSETRON 4 MG: 2 INJECTION INTRAMUSCULAR; INTRAVENOUS at 01:28

## 2018-04-03 RX ADMIN — ROCURONIUM BROMIDE 30 MG: 10 INJECTION INTRAVENOUS at 07:41

## 2018-04-03 RX ADMIN — ONDANSETRON 4 MG: 2 INJECTION INTRAMUSCULAR; INTRAVENOUS at 08:45

## 2018-04-03 RX ADMIN — PIPERACILLIN SODIUM AND TAZOBACTAM SODIUM 3.38 G: 3; .375 INJECTION, POWDER, LYOPHILIZED, FOR SOLUTION INTRAVENOUS at 12:32

## 2018-04-03 RX ADMIN — FENTANYL CITRATE 100 MCG: 50 INJECTION, SOLUTION INTRAMUSCULAR; INTRAVENOUS at 07:41

## 2018-04-03 RX ADMIN — LIDOCAINE HYDROCHLORIDE 30 MG: 10 INJECTION, SOLUTION INFILTRATION; PERINEURAL at 07:41

## 2018-04-03 RX ADMIN — GLYCOPYRROLATE 0.2 MG: 0.2 INJECTION, SOLUTION INTRAMUSCULAR; INTRAVENOUS at 07:41

## 2018-04-03 RX ADMIN — ONDANSETRON 4 MG: 2 INJECTION INTRAMUSCULAR; INTRAVENOUS at 22:14

## 2018-04-03 RX ADMIN — FENTANYL CITRATE 50 MCG: 50 INJECTION, SOLUTION INTRAMUSCULAR; INTRAVENOUS at 08:43

## 2018-04-03 RX ADMIN — HYDROMORPHONE HYDROCHLORIDE 0.5 MG: 1 INJECTION, SOLUTION INTRAMUSCULAR; INTRAVENOUS; SUBCUTANEOUS at 01:30

## 2018-04-03 RX ADMIN — SODIUM CHLORIDE, POTASSIUM CHLORIDE, SODIUM LACTATE AND CALCIUM CHLORIDE: 600; 310; 30; 20 INJECTION, SOLUTION INTRAVENOUS at 22:41

## 2018-04-03 RX ADMIN — PROPOFOL 200 MG: 10 INJECTION, EMULSION INTRAVENOUS at 07:41

## 2018-04-03 RX ADMIN — DEXAMETHASONE SODIUM PHOSPHATE 8 MG: 4 INJECTION, SOLUTION INTRA-ARTICULAR; INTRALESIONAL; INTRAMUSCULAR; INTRAVENOUS; SOFT TISSUE at 07:41

## 2018-04-03 RX ADMIN — SODIUM CHLORIDE 850 ML: 900 IRRIGANT IRRIGATION at 09:31

## 2018-04-03 RX ADMIN — PIPERACILLIN SODIUM AND TAZOBACTAM SODIUM 3.38 G: 3; .375 INJECTION, POWDER, LYOPHILIZED, FOR SOLUTION INTRAVENOUS at 18:31

## 2018-04-03 RX ADMIN — Medication 4 MG: at 09:21

## 2018-04-03 RX ADMIN — KETOROLAC TROMETHAMINE 15 MG: 15 INJECTION, SOLUTION INTRAMUSCULAR; INTRAVENOUS at 22:10

## 2018-04-03 RX ADMIN — KETOROLAC TROMETHAMINE 15 MG: 15 INJECTION, SOLUTION INTRAMUSCULAR; INTRAVENOUS at 16:04

## 2018-04-03 RX ADMIN — ONDANSETRON 4 MG: 2 INJECTION INTRAMUSCULAR; INTRAVENOUS at 16:09

## 2018-04-03 RX ADMIN — BUPIVACAINE HYDROCHLORIDE 30 ML: 5 INJECTION, SOLUTION EPIDURAL; INTRACAUDAL at 09:31

## 2018-04-03 RX ADMIN — FENTANYL CITRATE 50 MCG: 50 INJECTION, SOLUTION INTRAMUSCULAR; INTRAVENOUS at 10:21

## 2018-04-03 RX ADMIN — MIDAZOLAM 1 MG: 1 INJECTION INTRAMUSCULAR; INTRAVENOUS at 07:35

## 2018-04-03 RX ADMIN — SODIUM CHLORIDE, POTASSIUM CHLORIDE, SODIUM LACTATE AND CALCIUM CHLORIDE: 600; 310; 30; 20 INJECTION, SOLUTION INTRAVENOUS at 08:15

## 2018-04-03 RX ADMIN — GLYCOPYRROLATE 0.6 MG: 0.2 INJECTION, SOLUTION INTRAMUSCULAR; INTRAVENOUS at 09:21

## 2018-04-03 RX ADMIN — CEFAZOLIN SODIUM 2 G: 2 INJECTION, SOLUTION INTRAVENOUS at 07:35

## 2018-04-03 RX ADMIN — AMPICILLIN SODIUM AND SULBACTAM SODIUM 3 G: 2; 1 INJECTION, POWDER, FOR SOLUTION INTRAMUSCULAR; INTRAVENOUS at 02:46

## 2018-04-03 RX ADMIN — PROPOFOL 30 MCG/KG/MIN: 10 INJECTION, EMULSION INTRAVENOUS at 08:05

## 2018-04-03 RX ADMIN — FENTANYL CITRATE 50 MCG: 50 INJECTION, SOLUTION INTRAMUSCULAR; INTRAVENOUS at 10:01

## 2018-04-03 RX ADMIN — SODIUM CHLORIDE, POTASSIUM CHLORIDE, SODIUM LACTATE AND CALCIUM CHLORIDE: 600; 310; 30; 20 INJECTION, SOLUTION INTRAVENOUS at 12:01

## 2018-04-03 RX ADMIN — SODIUM CHLORIDE, POTASSIUM CHLORIDE, SODIUM LACTATE AND CALCIUM CHLORIDE: 600; 310; 30; 20 INJECTION, SOLUTION INTRAVENOUS at 07:35

## 2018-04-03 ASSESSMENT — ENCOUNTER SYMPTOMS
CHILLS: 1
HEMATURIA: 0
DYSURIA: 0
FREQUENCY: 0
NAUSEA: 1
DIARRHEA: 0
FLANK PAIN: 1

## 2018-04-03 ASSESSMENT — ACTIVITIES OF DAILY LIVING (ADL)
FALL_HISTORY_WITHIN_LAST_SIX_MONTHS: NO
SWALLOWING: 0-->SWALLOWS FOODS/LIQUIDS WITHOUT DIFFICULTY
AMBULATION: 0-->INDEPENDENT
RETIRED_EATING: 0-->INDEPENDENT
RETIRED_COMMUNICATION: 0-->UNDERSTANDS/COMMUNICATES WITHOUT DIFFICULTY
COGNITION: 0 - NO COGNITION ISSUES REPORTED
TRANSFERRING: 0-->INDEPENDENT
TOILETING: 0-->INDEPENDENT
BATHING: 0-->INDEPENDENT
DRESS: 0-->INDEPENDENT

## 2018-04-03 NOTE — ANESTHESIA POSTPROCEDURE EVALUATION
Patient: Mirta Waurika    Procedure(s):  LAPAROSCOPIC APPENDECTOMY    - Wound Class: III-Contaminated    Diagnosis:appendicitis   Diagnosis Additional Information: No value filed.    Anesthesia Type:  General, ETT    Note:  Anesthesia Post Evaluation    Patient location during evaluation: PACU  Patient participation: Able to fully participate in evaluation  Level of consciousness: awake  Pain management: adequate  Airway patency: patent  Cardiovascular status: acceptable  Respiratory status: acceptable  Hydration status: acceptable  PONV: controlled     Anesthetic complications: None          Last vitals:  Vitals:    04/03/18 0950 04/03/18 0955 04/03/18 1000   BP: 115/76 113/75 108/76   Pulse:      Resp: 17 21 17   Temp: 98.5  F (36.9  C)     SpO2: 100% 100% 94%         Electronically Signed By: Nick Stark DO  April 3, 2018  10:14 AM

## 2018-04-03 NOTE — ANESTHESIA PREPROCEDURE EVALUATION
Anesthesia Evaluation     .             ROS/MED HX    ENT/Pulmonary:  - neg pulmonary ROS     Neurologic:  - neg neurologic ROS     Cardiovascular:  - neg cardiovascular ROS       METS/Exercise Tolerance:     Hematologic:  - neg hematologic  ROS       Musculoskeletal:  - neg musculoskeletal ROS       GI/Hepatic: Comment: Gastritis    (+) hepatitis type B,       Renal/Genitourinary:  - ROS Renal section negative       Endo: Comment: .Body mass index is 27.44 kg/(m^2).                Psychiatric:  - neg psychiatric ROS       Infectious Disease:  - neg infectious disease ROS       Malignancy:         Other: Comment: .Lab Test        04/03/18                       0111          WBC          13.8*         HGB          13.0          MCV          81            PLT          136*           Lab Test        04/03/18 02/19/18                       0111          0850          NA           136          140           POTASSIUM    3.7          3.8           CHLORIDE     105          108           CO2          26           24            BUN          7            13            CR           0.56         0.60          ANIONGAP     5            8             RIVERA          8.5          9.0           GLC          105*         99                                Physical Exam  Normal systems: cardiovascular and pulmonary    Airway   Mallampati: II    Dental     Cardiovascular   Rhythm and rate: regular and normal      Pulmonary    breath sounds clear to auscultation                    Anesthesia Plan      History & Physical Review  History and physical reviewed and following examination; no interval change.    ASA Status:  2 .        Plan for General and ETT with Intravenous induction. Maintenance will be Inhalation and Balanced.    PONV prophylaxis:  Ondansetron (or other 5HT-3) and Dexamethasone or Solumedrol       Postoperative Care      Consents  Anesthetic plan, risks, benefits and alternatives discussed with:  Patient or  representative..                          .

## 2018-04-03 NOTE — PHARMACY-ADMISSION MEDICATION HISTORY
Admission medication history interview status for this patient is complete. See [facility] admission navigator for allergy information, prior to admission medications and immunization status.     Medication history interview source(s):Patient  Medication history resources (including written lists, pill bottles, clinic record):None  Primary pharmacy:    Changes made to PTA medication list:  Added: ibuprofen  Deleted: hydrocortisone  Changed: claritin, omeprazole    Actions taken by pharmacist (provider contacted, etc):sticky note for md    Additional medication history information:None    Medication reconciliation/reorder completed by provider prior to medication history? Yes    Do you take OTC medications (eg tylenol, ibuprofen, fish oil, eye/ear drops, etc)? Y(Y/N)    For patients on insulin therapy: NA (Y/N)      Prior to Admission medications    Medication Sig Last Dose Taking? Auth Provider   loratadine (CLARITIN) 10 MG tablet Take 10 mg by mouth daily as needed for allergies Past Week at Unknown time Yes Unknown, Entered By History   OMEPRAZOLE PO Take 20 mg by mouth daily as needed  Yes Unknown, Entered By History   IBUPROFEN PO Take 400 mg by mouth every 6 hours as needed for moderate pain 4/2/2018 at Unknown time Yes Unknown, Entered By History   vitamin D (ERGOCALCIFEROL) 40217 UNIT capsule Take 1 capsule (50,000 Units) by mouth every 7 days for 8 doses 4/1/2018 Yes Nayan Holloway MD

## 2018-04-03 NOTE — IP AVS SNAPSHOT
[facility]    [address]    [facility] [address]    Phone:  [phone]    Fax:  [phone]                                       After Visit Summary   4/3/2018    Mirta Sims    MRN: [id number]           After Visit Summary Signature Page     I have received my discharge instructions, and my questions have been answered. I have discussed any challenges I see with this plan with the nurse or doctor.    ..........................................................................................................................................  Patient/Patient Representative Signature      ..........................................................................................................................................  Patient Representative Print Name and Relationship to Patient    ..................................................               ................................................  Date                                            Time    ..........................................................................................................................................  Reviewed by Signature/Title    ...................................................              ..............................................  Date                                                            Time

## 2018-04-03 NOTE — ED PROVIDER NOTES
History     Chief Complaint:  Flank pain    ULICES Sims is a 47 year old female with a history of hepatitis B and gastritis who presents with flank pain. The patient reports that she has been experiencing two days of right sided flank pain. She has been taking ibuprofen which has helped somewhat, though the pain is constant.This pain occasionally radiates into her right leg. The patient has had minimal nausea but she states that this is normal for her baseline. She has not recorded her temperature but has begun feeling chilled since last evening. Per the patient's  she has not felt febrile. She denies having experienced any diarrhea or urinary symptoms. Of note the patient did have an appendectomy approximately one year ago and this pain feels similar to the pain she felt prior to this. The patient also has a history of cholecystectomy.    Allergies:  Azithromycin    Medications:    Omeprazole  Claritin    Past Medical History:    Chronic hepatitis B  Gastritis  Iron deficiency anemia  Post operative wound abscess    Past Surgical History:    Appendectomy  Cholecystectomy  Hysterotomy with permanent fallopian implant  Laparotomy exploratory  Implantable mesh placement    Family History:    Gout  Hypertension    Social History:  The patient was accompanied to the ED by her .  Smoking Status: No  Smokeless Tobacco: No  Alcohol Use: Yes   Marital Status:   [2]    Review of Systems   Constitutional: Positive for chills.   Gastrointestinal: Positive for nausea. Negative for diarrhea.   Genitourinary: Positive for flank pain. Negative for dysuria, frequency, hematuria and urgency.   All other systems reviewed and are negative.    Physical Exam   Vitals:  Patient Vitals for the past 24 hrs:   BP Temp Temp src Pulse Resp SpO2   04/03/18 0245 104/70 - - - - 96 %   04/03/18 0230 103/69 - - - - 97 %   04/03/18 0215 103/64 - - - - 94 %   04/03/18 0200 128/69 - - - - 93 %   04/03/18 0135 - - - - - 98  %   04/03/18 0129 105/70 - - - - -   04/03/18 0057 112/77 98.1  F (36.7  C) Temporal 84 18 97 %        Physical Exam   Constitutional: She is cooperative.   HENT:   Right Ear: Tympanic membrane normal.   Left Ear: Tympanic membrane normal.   Mouth/Throat: Oropharynx is clear and moist and mucous membranes are normal.   Eyes: Conjunctivae are normal.   Neck: Normal range of motion.   Cardiovascular: Regular rhythm and normal heart sounds.    Pulmonary/Chest: Effort normal and breath sounds normal.   Abdominal: Soft. Normal appearance and bowel sounds are normal. There is tenderness in the right upper quadrant and right lower quadrant. There is rebound.   Musculoskeletal: Normal range of motion.   Lymphadenopathy:     She has no cervical adenopathy.   Neurological: She is alert.   Skin: Skin is warm and dry.   Psychiatric: She has a normal mood and affect.       Emergency Department Course   Imaging:  Radiology findings were communicated with the patient who voiced understanding of the findings.  CT abdomen pelvis without contrast (stone protocol):  IMPRESSION:  1. Probable acute appendicitis. No definite evidence of rupture. Of  note, the appendix is in a retrocecal location above the level of the  iliac crests.  2. No renal or ureteral calculi or evidence of urinary obstruction.  Reading per radiology.     Laboratory:  Laboratory findings were communicated with the patient who voiced understanding of the findings.  CBC: WBC: 13.8(H), MCH: 26.3(L), RDW: 16.3(H), PLT: 136(H), Neutrophil: 11.2(H), o/w WNL (HGB 13.0)   CMP: Glucose: 105(H), Bilirubin total: 1.5(H), o/w WNL (Creatinine 0.56)  Lipase: 210  UA: Urineketon: 20, pH: 8.0(H), o/w WNL    Interventions:  0128 Zofran 4 mg IV   0130 Dilaudid 0.5 mg IV  0246 Zofran 4 mg IV   0322 Unasyn 3 g IV    Emergency Department Course:  Nursing notes and vitals reviewed.  I performed an exam of the patient as documented above.   IV was inserted and blood was drawn for  laboratory testing, results above.  The patient provided a urine sample here in the emergency department. This was sent for laboratory testing, findings above.   The patient was sent for a CT while in the emergency department, results above.      0201 I spoke with Dr. Hicks of Radiology regarding the patient    0219 I spoke with Dr. Bower of General Surgery regarding the patient    I personally reviewed the laboratory results with the patient and answered all related questions prior to transfer to OR    Impression & Plan      Medical Decision Making:  Mirta Sims is a 47 year old female who presents to the emergency department today with right sided abdominal pain. CT demonstrates finding consistent with appendicitis. This was surprising to me given her previous history of appendectomy. On close review of records from [facility] however, it sounds like the previous abdominal abscess was due to purulent material from the appendiceal stump. Dr. Bower of General Surgery reviewed the CT and confirms that there is a significant portion of appendiceal stump that represents there inflamed tissue. He feels that surgical intervention for removal of this is required. We have treated the patient with antibiotics. She will be transferred to the operating room in anticipation of appendectomy this morning.      Diagnosis:    ICD-10-CM    1. Acute appendicitis with localized peritonitis K35.3         Disposition:   Transferred to the OR     CMS Diagnoses: None     Scribe Disclosure:  Erick GARCIA, am serving as a scribe at 1:18 AM on 4/3/2018 to document services personally performed by Kerline Cabrera MD, based on my observations and the provider's statements to me.   [facility] EMERGENCY DEPARTMENT       Kerline Cabrera MD  04/09/18 0892

## 2018-04-03 NOTE — BRIEF OP NOTE
[facility] Brief Operative Note    Pre-operative diagnosis: appendicitis    Post-operative diagnosis Acute stump appendicitis with perforation and abscess.   Procedure: Procedure(s):  LAPAROSCOPIC APPENDECTOMY    - Wound Class: III-Contaminated   Surgeon(s): Surgeon(s) and Role:     * Clarence Wayne MD - Primary     * Maribell Fuller PA-C - Assisting   Estimated blood loss: 20 mL    Specimens:   ID Type Source Tests Collected by Time Destination   A : APPENDIX Tissue Appendix SURGICAL PATHOLOGY EXAM Clarence Wayne MD 4/3/2018  9:20 AM       Findings: Approximately 3cm of remnant appendiceal stump present in right retroperitoneum, organized abscess adjacent to this site.

## 2018-04-03 NOTE — ED NOTES
Pt up independently to bathroom. To have surgery in am. Didn't like pain med she thought it made her cough. Refused anymore

## 2018-04-03 NOTE — ANESTHESIA CARE TRANSFER NOTE
Patient: Mirta Sims    Procedure(s):  LAPAROSCOPIC APPENDECTOMY    - Wound Class: III-Contaminated    Diagnosis: appendicitis   Diagnosis Additional Information: No value filed.    Anesthesia Type:   General, ETT     Note:  Airway :Face Mask  Patient transferred to:PACU  Comments: VSS.  Spontaneously breathing O2 per open face mask.  Report given to RN.Handoff Report: Identifed the Patient, Identified the Reponsible Provider, Reviewed the pertinent medical history, Discussed the surgical course, Reviewed Intra-OP anesthesia mangement and issues during anesthesia, Set expectations for post-procedure period and Allowed opportunity for questions and acknowledgement of understanding      Vitals: (Last set prior to Anesthesia Care Transfer)    CRNA VITALS  4/3/2018 0911 - 4/3/2018 0949      4/3/2018             Pulse: 66    SpO2: 98 %    Resp Rate (observed): (!)  5                Electronically Signed By: JUDY Guzman CRNA  April 3, 2018  9:49 AM

## 2018-04-03 NOTE — PLAN OF CARE
"Problem: Patient Care Overview  Goal: Individualization & Mutuality  Outcome: No Change  PRIMARY DIAGNOSIS: LAP APPY \  DX: (prior stump) abscess treatment  OUTPATIENT/OBSERVATION GOALS TO BE MET BEFORE DISCHARGE:  1. Stable vital signs Yes  2. Tolerating diet:Yes  3. Pain controlled with oral pain medications:  Yes  4. Positive bowel sounds:  Yes  5. Voiding without difficulty:  pending  6. Able to ambulate:  post anesthesia now  7. Provider specific discharge goals met:  No  Pt sleeping, easily arouses to voice.  Arrived 1125 to room 206-1. Spouse at bedside.  Pt educated on goals, pain management plan.  Vitals are stable.  BP 98/61  Pulse 67  Temp 97.4  F (36.3  C) (Oral)  Resp 11  Ht 1.575 m (5' 2\")  Wt 68 kg (150 lb)  LMP 03/20/2018 (Approximate)  SpO2 98%  BMI 27.44 kg/m2 Capno in place, 02 at 2L.  Will educate IS, ambulate when pt not as sleepy.  x2 lap sites are CDi with liquid bandage.  SATURNINO to midline low abd, SEROSANG drainage.  Will continue to monitor.    Discharge Planner Nurse   Safe discharge environment identified: Yes  Barriers to discharge: No       Entered by: Abbie Avina 04/03/2018 12:07 PM     Please review provider order for any additional goals.   Nurse to notify provider when observation goals have been met and patient is ready for discharge.      "

## 2018-04-03 NOTE — IP AVS SNAPSHOT
MRN:[id number]                      After Visit Summary   4/3/2018    Mirta Sims    MRN: [id number]           Thank you!     Thank you for choosing [facility] for your care. Our goal is always to provide you with excellent care. Hearing back from our patients is one way we can continue to improve our services. Please take a few minutes to complete the written survey that you may receive in the mail after you visit. If you would like to speak to someone directly about your visit please contact Patient Relations at [phone]. Thank you!          Patient Information     Date Of Birth          1970        Designated Caregiver       Most Recent Value    Caregiver    Will someone help with your care after discharge? yes    Name of designated caregiver - Ernie    Phone number of caregiver [phone]    Caregiver address [address] [facility] [place] [address]      About your hospital stay     You were admitted on:  April 3, 2018 You last received care in the:  [facility]    You were discharged on:  April 5, 2018       Who to Call     For medical emergencies, please call 911.  For non-urgent questions about your medical care, please call your primary care provider or clinic, [phone]  For questions related to your surgery, please call your surgery clinic        Attending Provider     Provider Specialty    Kerline Cabrera MD Emergency Medicine    Waverly HallClarence MD General Surgery       Primary Care Provider Office Phone # Fax #    Nayan Kieran Holloway [phone]      Your next 10 appointments already scheduled     Apr 06, 2018  3:15 PM CDT   (Arrive by 3:00 PM)   MA SCREENING DIGITAL BILATERAL with EAMA1   [facility] [place] [facility]    [address] [place] ,[address]  [place] [address] [phone]           Do not use any powder, lotion or deodorant under your arms or on your breast.  "If you do, we will ask you to remove it before your exam.  Wear comfortable, two-piece clothing.  If you have any allergies, tell your care team.  Bring any previous mammograms from other facilities or have them mailed to the breast center. Three-dimensional (3D) mammograms are available at [facility] locations in [facility], [place], [place], [facility], [place], [place], and [place]. [facility] locations include [place] and [facility] in [place]. Benefits of 3D mammograms include: - Improved rate of cancer detection - Decreases your chance of having to go back for more tests, which means fewer: - \"False-positive\" results (This means that there is an abnormal area but it isn't cancer.) - Invasive testing procedures, such as a biopsy or surgery - Can provide clearer images of the breast if you have dense breast tissue. 3D mammography is an optional exam that anyone can have with a 2D mammogram. It doesn't replace or take the place of a 2D mammogram. 2D mammograms remain an effective screening test for all women.  Not all insurance companies cover the cost of a 3D mammogram. Check with your insurance.              Further instructions from your care team       HOME CARE FOLLOWING APPENDECTOMY  ANN Caruso, INOCENTE Flynn, GORAN Garza, MARIA DEL CARMEN Simeon    Special instructions for Mirta Branch:  --Complete entire antibiotic course.  --Consider eating active culture yogurt or add oral probiotic tablet (over-the-counter) if loose stools.       INCISIONAL CARE:  Replace the bandage over your incisions and previous drain site until all drainage stops, or if more comfortable to have in place.  If Dermabond (a type of skin glue) is present, leave in place until it wears/flakes off.     BATHING:  Avoid baths for 1 week after surgery.  Showers are okay.  You may wash your hair at any time.  Gently pat your incision dry after bathing.    ACTIVITY:  Light Activity -- you may immediately be up " and about as tolerated.  Driving -- you may drive when comfortable and off narcotic pain medications.  Light Work -- resume when comfortable off pain medications.  (If you can drive, you probably can work.)  Strenuous Work/Activity -- limit lifting to 20 pounds for 1 week.  Progressively increase with time.  Active Sports (running, biking, etc.) -- cautiously resume after 2 weeks.    DISCOMFORT:  Use pain medications as prescribed by your surgeon.  Take the pain medication with some food, when possible, to minimize side effects.  Intermittent use of ice packs at the incision sites may help during the first 48 hours.  Expect gradual improvement.    DIET:  No restrictions.  Drink plenty of fluids.  While taking pain medications, increase dietary fiber or add a fiber supplementation like Metamucil or Citrucel to help prevent constipation - a possible side effect of pain medications.    NAUSEA:  If nauseated from the anesthetic/pain meds; rest in bed, get up cautiously with assistance, and drink clear liquids (juice, tea, broth).    RETURN APPOINTMENT:  Schedule a follow-up visit 2-3 weeks post-op.  Office Phone:  [phone]     CONTACT US IF THE FOLLOWING DEVELOPS:   1. A fever that is above 101     2. If there is a large amount of drainage, bleeding, or swelling.   3. Severe pain that is not relieved by your prescription.   4. Drainage that is thick, cloudy, yellow, green or white.   5. Any other questions not answered by  Frequently Asked Questions  sheet.      FREQUENTLY ASKED QUESTIONS:    Q:  How should my incision look?    A:  Normally your incision will appear slightly swollen with light redness directly along the incision itself as it heals.  It may feel like a bump or ridge as the healing/scarring happens, and over time (3-4 months) this bump or ridge feeling should slowly go away.  In general, clear or pink watery drainage can be normal at first as your incision heals, but should decrease over time.    Q:   How do I know if my incision is infected?  A:  Look at your incision for signs of infection, like redness around the incision spreading to surrounding skin, or drainage of cloudy or foul-smelling drainage.  If you feel warm, check your temperature to see if you are running a fever.    **If any of these things occur, please notify the nurse at our office.  We may need you to come into the office for an incision check.      Q:  How do I take care of my incision?  A:  If you have a dressing in place - Starting the day after surgery, replace the dressing 1-2 times a day until there is no further drainage from the incision.  At that time, a dressing is no longer needed.  Try to minimize tape on the skin if irritation is occurring at the tape sites.  If you have significant irritation from tape on the skin, please call the office to discuss other method of dressing your incision.    Small pieces of tape called  steri-strips  may be present directly overlying your incision; these may be removed 10 days after surgery unless otherwise specified by your surgeon.  If these tapes start to loosen at the ends, you may trim them back until they fall off or are removed.    A:  If you had  Dermabond  tissue glue used as a dressing (this causes your incision to look shiny with a clear covering over it) - This type of dressing wears off with time and does not require more dressings over the top unless it is draining around the glue as it wears off.  Do not apply ointments or lotions over the incisions until the glue has completely worn off.    Q:  There is a piece of tape or a sticky  lead  still on my skin.  Can I remove this?  A:  Sometimes the sticky  leads  used for monitoring during surgery or for evaluation in the emergency department are not all removed while you are in the hospital.  These sometimes have a tab or metal dot on them.  You can easily remove these on your own, like taking off a band-aid.  If there is a gel  substance under the  lead , simply wipe/clean it off with a washcloth or paper towel.      Q:  What can I do to minimize constipation (very hard stools, or lack of stools)?  A:  Stay well hydrated.  Increase your dietary fiber intake or take a fiber supplement -with plenty of water.  Walk around frequently.  You may consider an over-the-counter stool-softener.  Your Pharmacist can assist you with choosing one that is stocked at your pharmacy.  Constipation is also one of the most common side effects of pain medication.  If you are using pain medication, be pro-active and try to PREVENT problems with constipation by taking the steps above BEFORE constipation becomes a problem.    Q:  What do I do if I need more pain medications?  A:  Call the office to receive refills.  Be aware that certain pain meds cannot be called into a pharmacy and actually require a paper prescription.  A change may be made in your pain med as you progress thru your recovery period or if you have side effects to certain meds.    --Pain meds are NOT refilled after 5pm on weekdays, and NOT AT ALL on the weekends, so please look ahead to prevent problems.      Q:  Why am I having a hard time sleeping now that I am at home?  A:  Many medications you receive while you are in the hospital can impact your sleep for a number of days after your surgery/hospitalization.  Decreased level of activity and naps during the day may also make sleeping at night difficult.  Try to minimize day-time naps, and get up frequently during the day to walk around your home during your recovery time.  Sleep aides may be of some help, but are not recommended for long-term use.      Q:  I am having some back discomfort.  What should I do?  A:  This may be related to certain positioning that was required for your surgery, extended periods of time in bed, or other changes in your overall activity level.  You may try ice, heat, acetaminophen, or ibuprofen to treat this  temporarily.  Note that many pain medications have acetaminophen in them and would state this on the prescription bottle.  Be sure not to exceed the maximum of 4000mg per day of acetaminophen.     **If the pain you are having does not resolve, is severe, or is a flare of back pain you have had on other occasions prior to surgery, please contact your primary physician for further recommendations or for an appointment to be examined at their office.    Q:  Why am I having headaches?  A:  Headaches can be caused by many things:  caffeine withdrawal, use of pain meds, dehydration, high blood pressure, lack of sleep, over-activity/exhaustion, flare-up of usual migraine headaches.  If you feel this is related to muscle tension (a band-like feeling around the head, or a pressure at the low-back of the head) you may try ice or heat to this area.  You may need to drink more fluids (try electrolyte drink like Gatorade), rest, or take your usual migraine medications.   **If your headaches do not resolve, worsen, are accompanied by other symptoms, or if your blood pressure is high, please call your primary physician for recommendation and/or examination.    Q:  I am unable to urinate.  What do I do?  A:  A small percentage of people can have difficulty urinating initially after surgery.  This includes being able to urinate only a very small amount at a time and feeling discomfort or pressure in the very low abdomen.  This is called  urinary retention , and is actually an urgent situation.  Proceed to your nearest Emergency department for evaluation (not an Urgent Care Center).  Sometimes the bladder does not work correctly after certain medications you receive during surgery, or related to certain procedures.  You may need to have a catheter placed until your bladder recovers.  When planning to go to an Emergency department, it may help to call the ER to let them know you are coming in for this problem after a surgery.  This  "may help you get in quicker to be evaluated.  **If you have symptoms of a urinary tract infection, please contact your primary physician for the proper evaluation and treatment.          If you have other questions, please call the office Monday thru Friday between 8am and 5pm to discuss with the nurse or physician assistant.  #[phone]    There is a surgeon ON CALL on weekday evenings and over the weekend in case of urgent need only, and may be contacted at the same number.    If you are having an emergency, call 911 or proceed to your nearest emergency department.            Pending Results     Date and Time Order Name Status Description    4/3/2018 0920 Surgical pathology exam In process             Statement of Approval     Ordered          04/05/18 0936  I have reviewed and agree with all the recommendations and orders detailed in this document.  EFFECTIVE NOW     Approved and electronically signed by:  Angelique Vu PA-C             Admission Information     Date & Time Provider Department Dept. Phone    4/3/2018 Clarence Wayne MD [facility] [phone]      Your Vitals Were     Blood Pressure Pulse Temperature Respirations Height Weight    112/71 58 98.6  F (37  C) (Oral) 18 1.575 m (5' 2\") 68 kg (150 lb)    Last Period Pulse Oximetry BMI (Body Mass Index)             03/20/2018 (Approximate) 95% 27.44 kg/m2         MyChart Information     [facility] gives you secure access to your electronic health record. If you see a primary care provider, you can also send messages to your care team and make appointments. If you have questions, please call your primary care clinic.  If you do not have a primary care provider, please call [phone] and they will assist you.        Care EveryWhere ID     This is your Care EveryWhere ID. This could be used by other organizations to access your [place] medical records  [id number]        Equal Access to Services     MARTIN CISNEROS AH: " Hadii aad renea etiennelissao Sofrancesali, waaxda luqadaha, qaybta kaalmada jemima, gege huang. So [facility] [phone].    ATENCIÓN: Si meila kayli, tiene a dowell disposición servicios gratuitos de asistencia lingüística. Llame al [phone].    We comply with applicable federal civil rights laws and [place] laws. We do not discriminate on the basis of race, color, national origin, age, disability, sex, sexual orientation, or gender identity.               Review of your medicines      START taking        Dose / Directions    amoxicillin-clavulanate 875-125 MG per tablet   Commonly known as:  AUGMENTIN   Used for:  S/P laparoscopic appendectomy        Dose:  1 tablet   Take 1 tablet by mouth 2 times daily for 7 days   Quantity:  14 tablet   Refills:  0       oxyCODONE IR 5 MG tablet   Commonly known as:  ROXICODONE   Used for:  S/P laparoscopic appendectomy        Dose:  5-10 mg   Take 1-2 tablets (5-10 mg) by mouth every 3 hours as needed for other (pain control or improvement in physical function. Hold dose for analgesic side effects.) Take with food to minimize side effects.   Quantity:  20 tablet   Refills:  0         CONTINUE these medicines which have NOT CHANGED        Dose / Directions    IBUPROFEN PO   Notes to Patient:  Take every 6-8 hours on a schedule alternating with Tyelnol until pain is resolved.        Dose:  400 mg   Take 400 mg by mouth every 6 hours as needed for moderate pain   Refills:  0       loratadine 10 MG tablet   Commonly known as:  CLARITIN        Dose:  10 mg   Take 10 mg by mouth daily as needed for allergies   Refills:  0       OMEPRAZOLE PO   Notes to Patient:  Take daily until antibiotics are finished. Discuss with primary care doctor about possible need to continue.        Dose:  20 mg   Take 20 mg by mouth daily as needed   Refills:  0       vitamin D 74511 UNIT capsule   Commonly known as:  ERGOCALCIFEROL   Used for:  Vitamin D deficiency        Dose:   14730 Units   Take 1 capsule (50,000 Units) by mouth every 7 days for 8 doses   Quantity:  8 capsule   Refills:  0            Where to get your medicines      These medications were sent to [place], [place] - [address] [address] [facility]  [address] [facility] [address]     Phone:  [phone]     amoxicillin-clavulanate 875-125 MG per tablet         Some of these will need a paper prescription and others can be bought over the counter. Ask your nurse if you have questions.     Bring a paper prescription for each of these medications     oxyCODONE IR 5 MG tablet                Protect others around you: Learn how to safely use, store and throw away your medicines at www.disposemymeds.org.        ANTIBIOTIC INSTRUCTION     You've Been Prescribed an Antibiotic - Now What?  Your healthcare team thinks that you or your loved one might have an infection. Some infections can be treated with antibiotics, which are powerful, life-saving drugs. Like all medications, antibiotics have side effects and should only be used when necessary. There are some important things you should know about your antibiotic treatment.      Your healthcare team may run tests before you start taking an antibiotic.    Your team may take samples (e.g., from your blood, urine or other areas) to run tests to look for bacteria. These test can be important to determine if you need an antibiotic at all and, if you do, which antibiotic will work best.      Within a few days, your healthcare team might change or even stop your antibiotic.    Your team may start you on an antibiotic while they are working to find out what is making you sick.    Your team might change your antibiotic because test results show that a different antibiotic would be better to treat your infection.    In some cases, once your team has more information, they learn that you do not need an antibiotic at all. They may find out that you don't have  an infection, or that the antibiotic you're taking won't work against your infection. For example, an infection caused by a virus can't be treated with antibiotics. Staying on an antibiotic when you don't need it is more likely to be harmful than helpful.      You may experience side effects from your antibiotic.    Like all medications, antibiotics have side effects. Some of these can be serious.    Let you healthcare team know if you have any known allergies when you are admitted to the hospital.    One significant side effect of nearly all antibiotics is the risk of severe and sometimes deadly diarrhea caused by Clostridium difficile (C. Difficile). This occurs when a person takes antibiotics because some good germs are destroyed. Antibiotic use allows C. diificile to take over, putting patients at high risk for this serious infection.    As a patient or caregiver, it is important to understand your or your loved one's antibiotic treatment. It is especially important for caregivers to speak up when patients can't speak for themselves. Here are some important questions to ask your healthcare team.    What infection is this antibiotic treating and how do you know I have that infection?    What side effects might occur from this antibiotic?    How long will I need to take this antibiotic?    Is it safe to take this antibiotic with other medications or supplements (e.g., vitamins) that I am taking?     Are there any special directions I need to know about taking this antibiotic? For example, should I take it with food?    How will I be monitored to know whether my infection is responding to the antibiotic?    What tests may help to make sure the right antibiotic is prescribed for me?      Information provided by:  www.cdc.gov/getsmart  U.S. Department of Health and Human Services  Centers for disease Control and Prevention  National Center for Emerging and Zoonotic Infectious Diseases  Division of Healthcare Quality  Promotion        Information about OPIOIDS     PRESCRIPTION OPIOIDS: WHAT YOU NEED TO KNOW    Prescription opioids can be used to help relieve moderate to severe pain and are often prescribed following a surgery or injury, or for certain health conditions. These medications can be an important part of treatment but also come with serious risks. It is important to work with your health care provider to make sure you are getting the safest, most effective care.    WHAT ARE THE RISKS AND SIDE EFFECTS OF OPIOID USE?  Prescription opioids carry serious risks of addiction and overdose, especially with prolonged use. An opioid overdose, often marked by slowed breathing can cause sudden death. The use of prescription opioids can have a number of side effects as well, even when taken as directed:      Tolerance - meaning you might need to take more of a medication for the same pain relief    Physical dependence - meaning you have symptoms of withdrawal when a medication is stopped    Increased sensitivity to pain    Constipation    Nausea, vomiting, and dry mouth    Sleepiness and dizziness    Confusion    Depression    Low levels of testosterone that can result in lower sex drive, energy, and strength    Itching and sweating    RISKS ARE GREATER WITH:    History of drug misuse, substance use disorder, or overdose    Mental health conditions (such as depression or anxiety)    Sleep apnea    Older age (65 years or older)    Pregnancy    Avoid alcohol while taking prescription opioids.   Also, unless specifically advised by your health care provider, medications to avoid include:    Benzodiazepines (such as Xanax or Valium)    Muscle relaxants (such as Soma or Flexeril)    Hypnotics (such as Ambien or Lunesta)    Other prescription opioids    KNOW YOUR OPTIONS:  Talk to your health care provider about ways to manage your pain that do not involve prescription opioids. Some of these options may actually work better and have  fewer risks and side effects:    Pain relievers such as acetaminophen, ibuprofen, and naproxen    Some medications that are also used for depression or seizures    Physical therapy and exercise    Cognitive behavioral therapy, a psychological, goal-directed approach, in which patients learn how to modify physical, behavioral, and emotional triggers of pain and stress    IF YOU ARE PRESCRIBED OPIOIDS FOR PAIN:    Never take opioids in greater amounts or more often than prescribed    Follow up with your primary health care provider and work together to create a plan on how to manage your pain.    Talk about ways to help manage your pain that do not involve prescription opioids    Talk about all concerns and side effects    Help prevent misuse and abuse    Never sell or share prescription opioids    Never use another person's prescription opioids    Store prescription opioids in a secure place and out of reach of others (this may include visitors, children, friends, and family)    Visit www.cdc.gov/drugoverdose to learn about risks of opioid abuse and overdose    If you believe you may be struggling with addiction, tell your health care provider and ask for guidance or call [facility]'s National Helpline at 9-064-754-HELP    LEARN MORE / www.cdc.gov/drugoverdose/prescribing/guideline.html    Safely dispose of unused prescription opioids: Find your local drug take-back programs and more information about the importance of safe disposal at www.doseofreality.[place].gov             Medication List: This is a list of all your medications and when to take them. Check marks below indicate your daily home schedule. Keep this list as a reference.      Medications           Morning Afternoon Evening Bedtime As Needed    amoxicillin-clavulanate 875-125 MG per tablet   Commonly known as:  AUGMENTIN   Take 1 tablet by mouth 2 times daily for 7 days   Next Dose Due:  Start tonight.                                IBUPROFEN PO   Take 400 mg  by mouth every 6 hours as needed for moderate pain   Last time this was given:  600 mg on 4/5/2018 12:31 PM   Next Dose Due:  6:30 pm   Notes to Patient:  Take every 6-8 hours on a schedule alternating with Tyelnol until pain is resolved.                                loratadine 10 MG tablet   Commonly known as:  CLARITIN   Take 10 mg by mouth daily as needed for allergies                                OMEPRAZOLE PO   Take 20 mg by mouth daily as needed   Last time this was given:  20 mg on 4/5/2018  9:57 AM   Next Dose Due:  Tomorrow morning before breakfast.   Notes to Patient:  Take daily until antibiotics are finished. Discuss with primary care doctor about possible need to continue.                                oxyCODONE IR 5 MG tablet   Commonly known as:  ROXICODONE   Take 1-2 tablets (5-10 mg) by mouth every 3 hours as needed for other (pain control or improvement in physical function. Hold dose for analgesic side effects.) Take with food to minimize side effects.   Last time this was given:  5 mg on 4/5/2018 12:55 AM                                vitamin D 65007 UNIT capsule   Commonly known as:  ERGOCALCIFEROL   Take 1 capsule (50,000 Units) by mouth every 7 days for 8 doses

## 2018-04-03 NOTE — PLAN OF CARE
"Problem: Patient Care Overview  Goal: Plan of Care/Patient Progress Review  Outcome: Improving  Problem: Patient Care Overview  Goal: Individualization & Mutuality  Outcome: No Change  PRIMARY DIAGNOSIS: LAP APPY \  DX: (prior stump) abscess treatment  OUTPATIENT/OBSERVATION GOALS TO BE MET BEFORE DISCHARGE:  1. Stable vital signs Yes  2. Tolerating diet:Yes  3. Pain controlled with oral pain medications:  Yes  4. Positive bowel sounds:  Yes  5. Voiding without difficulty:  pending  6. Able to ambulate:  post anesthesia now  7. Provider specific discharge goals met:  No  Pt alert and oriented x4, more alert now and sleeps between cares. Vitals continue to be stable.  BP 98/61  Pulse 67  Temp 97.4  F (36.3  C) (Oral)  Resp 11  Ht 1.575 m (5' 2\")  Wt 68 kg (150 lb)  LMP 03/20/2018 (Approximate)  SpO2 98%  BMI 27.44 kg/m2 Capno in place, 02 at 2L. Pt performing  IS. Ambulated to BR, voiding 125cc.  Attempted to bladder scan, but due to location of SATURNINO site unable to use mid lower ABD for reading.  When used on other part of ABD, the result was zero.  x2 lap sites are CDi with liquid bandage.  SATURNINO to midline low abd, had 20cc ouput of SEROSANG drainage for day shift.  Will continue to monitor.     Discharge Planner Nurse   Safe discharge environment identified: Yes  Barriers to discharge: No       Entered by: Abbie Avina 04/03/2018 12:07 PM  Please review provider order for any additional goals.   Nurse to notify provider when observation goals have been met and patient is ready for discharge.         "

## 2018-04-03 NOTE — OP NOTE
Procedure Date: 04/03/2018      DATE OF SERVICE: 04/03/2018      PREOPERATIVE DIAGNOSIS:  Stump appendicitis with perforation.      POSTOPERATIVE DIAGNOSIS:  Stump appendicitis with perforation.      PROCEDURE:  Laparoscopic appendectomy with abscess drainage.      ANESTHESIA:  General plus local.      SURGEON:  Clarence Bower MD      ASSISTANT:  Maribell Fuller PA-C. The physician assistant was medically necessary for her skills in exposure suture and cutting suture and camera management.      SPECIMENS:  Appendiceal stump for routine.      COMPLICATIONS:  None.      INDICATIONS:  Ms. Sims is a healthy 47-year-old female who by report had an uneventful appendectomy 12/20/2016.  She returned with signs and symptoms consistent with an abscess, which was seen on a repeat CT at the time and drained percutaneously by her report also.  She evidently had recurrent abscesses and eventually required a return to the OR in January of last year for attempt at abscess drainage, this was converted to an open procedure.  The patient actually felt well in the meantime, but had 2 days of indolent right lower quadrant pain and chills, which prompted her to seek evaluation in our emergency department overnight.  There she was noted to have a mild leukocytosis of 13.8 thousand.  Subsequent CAT scan showed inflammation in a retrocecal position with what appeared to be a remnant appendix.  Because of these findings and clinical presentation, I offered and recommended a laparoscopy today to better elucidate the process with the potential that she may need an abscess drained and/or a stump appendix removed.  Risks of the operation, otherwise discussed in detail, these include infection, bleeding, harm to adjacent structures, need for limited bowel resection, conversion to an open procedure and need for prolonged convalescence in the event of a perforation or abscess were discussed.  The patient verbalized understanding of all the above  and consented to proceed.      FINDINGS:  There were some adhesions in the right lower quadrant from a prior surgery as well as to the midline incision, which we were able to avoid readily.  There was induration of the fat in the right pericolic gutter and retroperitoneum adjacent to the cecum.  There was approximately 3 cm of remnant appendix remaining with an organized abscess in the retroperitoneum measuring several centimeters across, there was also an errant suture noted, presumably the prior Endoloop.  The appendiceal stump was dissected back to healthy cecum and staple divided and retrieved.      DESCRIPTION OF PROCEDURE:  With the patient under excellent general anesthesia in supine position, the abdomen was prepped and draped out in the usual sterile surgical fashion.  Timeout was performed confirming the patient, procedure to be done as well as drug allergies.  She did receive a dose of Unasyn from her ER visit, she did receive a dose of Ancef for skin prophylaxis just prior to making our incision.  We began by longitudinally incising the previous epigastric scar from her prior laparoscopy and dissected down to the midline fascia with electrocautery and subsequently Kocher clamps.  The fascia was grasped and elevated into view and incised sharply with a #15 blade.  Stay sutures were placed at the apices and the peritoneum was punctured bluntly with a Carmalt clamp.  We introduced the Orlando trocar through this defect and applied pneumoperitoneum.  We appeared to be in a small adhesive cavity adjacent to the transverse colon and omentum.  There was, however, a thin film of adhesion towards the right lower quadrant, which we were able to dissect with the end of the scope opening up a relatively free right lower quadrant.  We then placed 2 more 5 mm ports along the midline offset slightly to the right to avoid some midline adhesions.  Some omental adhesions were taken down from the anterior abdominal wall to  expose the cecum.  The patient was placed left side downwards, we proceeded to mobilize the cecum.  By bluntly dissected the retroperitoneal fat, we encountered a moderate amount of induration along the ascending colon and cecum and actually bluntly entered an abscess cavity in the retroperitoneum.  This was several centimeters across and contained white creamy purulent fluid.  In the midst of this also, there was a nonabsorbable Endoloop stitch, which was suctioned during the course of cleaning the site.  Further dissection along the right pericolic gutter revealed what appeared to be a long indurated vermiform piece of tissue consistent with a remnant appendix.  The distal tip was rolled from lateral to medial and then we were able to use a LigaSure device to divide some of the pericolonic fat to create a window in the medial aspect of the presumptive appendiceal stump.  This was dissected free from the distal tissue with LigaSure as well and elevated into view.  We fenestrated and removed some of the mesoappendix adjacent to the cecal base.  The cecum itself was soft and pliable.  We used a thick tissue purple load tri-staple to divide the appendiceal stump including a portion of the cecum with it.  The specimen was then placed in an Endocatch pouch.  We copiously irrigated the right lower quadrant with a liter of saline and suctioned it dry.  We introduced a #15 round Jose drain through our inferior most 5 mm port site and placed the drainage portion into the abscess cavity.  The port was removed and the drain was anchored to the skin adjacent to this site.  The remaining port was removed under direct vision.  Hemostasis was deemed to be adequate.  The Orlando was also removed and the insufflation was released.  We delivered the specimen in the pouch and passed it off for routine pathologic exam.  The fascia was closed with an 0 Vicryl stitch in a figure-of-eight fashion x2.  The stay sutures were tied over  these.  Marcaine 30 mL, 0.5% were instilled in all the incisions.  Skin was closed with 4-0 Vicryls in a deep dermal interrupted fashion.  Skin glue was applied over top atop the wounds.  The patient tolerated the procedure well.  She was extubated and brought to recovery in excellent condition.  All sharps and sponge counts were correct at the conclusion of the case.         CHACHA ROSARIO MD             D: 2018   T: 2018   MT: LUIS      Name:     CARSON MACHADO   MRN:      [id number]        Account:        [id number]   :      1970           Procedure Date: 2018      Document: [id number]       cc: Copy for Provider        Nayan Dinero DO

## 2018-04-03 NOTE — H&P
Admitted:     04/03/2018      REASON FOR EVALUATION:  Recurrent appendicitis.      HISTORY OF PRESENT ILLNESS:  Ms. Sims is an otherwise healthy 47-year-old female with a history significant for a prior appendectomy which evidently was perforated and required a postoperative drain, and subsequent exploratory laparotomy for washout.  This was all done at [facility] in 2016.  Extensive review of the previous OP note as well as pathology report did not reveal any inconsistencies or complications.  The patient has also had a laparoscopic cholecystectomy.  She describes pain in the right lower quadrant since Sunday morning, waxing and waning, associated with some mild anorexia without fevers, but she did have some chills.  Because of her persistent symptoms, she sought evaluation in our ER overnight, there she was found to have a white blood cell count of 13.8 thousand, hemoglobin of 13, and unremarkable electrolyte panel.  Subsequent CT, however, showed a fluid-filled distended appendix in the retrocecal position without overt signs of perforation.      PAST MEDICAL AND SURGICAL HISTORY:  As noted above, the patient had a prior laparoscopic appendectomy, by her report the stump was left behind.  She had a postoperative abscess which was drained percutaneously, she subsequently had an open washout as well.  She had a prior laparoscopic cholecystectomy about a decade ago.  She does have a history of chronic hepatitis B, gastritis, anemia.      CURRENT MEDICATIONS:  The patient is normally on a PPI and Claritin.      ALLERGIES:  THE PATIENT HAS DRUG ALLERGY TO AZITHROMYCIN WHICH CAUSES A RASH.      SOCIAL HISTORY:  The patient is .  She is here with her .  She is not a smoker.      PHYSICAL EXAMINATION:   VITAL SIGNS:  Ms. Sims is afebrile this morning, her temperature is 99.3, pulse is 67 with a blood pressure of 103/69, respiration rate is 18 with 97% saturations on room air.   GENERAL:  Alert,  appropriate, nontoxic.   HEART:  Sounds are regular without murmurs.   LUNGS:  Breath sounds are clear, without wheezes or crackles.   ABDOMEN:  Flat and soft, she has a well-healed periumbilical midline incision without masses or hernias.  She has focal right lower quadrant tenderness, without guarding, masses or rebound.      ASSESSMENT AND PLAN:  This is a female with a prior history of appendectomy which apparently was complicated by postoperative abscess requiring both percutaneous drainage and washout, who now presents with right lower quadrant pain and tenderness, and CT/imaging findings consistent with possible appendicitis.  It is possible she has stump appendicitis or that more of the appendix was left behind than previously noted on her prior surgery.  It is also possible she has an epiploic tag which infarcted causing inflammation.  Regardless, I think it would be reasonable to proceed with a laparoscopy today to elucidate the underlying findings from her CT.  I therefore offered a laparoscopy with attempted appendectomy.  Risks of the procedure including infection, bleeding, harm to structures, need for open conversion, need for limited bowel resection, as well as the potential to be admitted postoperatively if the appendix is severely inflamed, gangrenous or perforated.  The patient verbalizes understanding of the above and wishes to proceed as described.  All of her questions were answered to the best of my ability.         CHACHA ROSARIO MD             D: 2018   T: 2018   MT: CC      Name:     CARSON MACHADO   MRN:      [id number]        Account:      [id number]   :      1970        Admitted:     2018                   Document: [id number]       cc: Nayan Holloway MD

## 2018-04-04 LAB
BASOPHILS # BLD AUTO: 0 10E9/L (ref 0–0.2)
BASOPHILS NFR BLD AUTO: 0.2 %
DIFFERENTIAL METHOD BLD: ABNORMAL
EOSINOPHIL # BLD AUTO: 0 10E9/L (ref 0–0.7)
EOSINOPHIL NFR BLD AUTO: 0 %
ERYTHROCYTE [DISTWIDTH] IN BLOOD BY AUTOMATED COUNT: 16.1 % (ref 10–15)
GLUCOSE BLDC GLUCOMTR-MCNC: 92 MG/DL (ref 70–99)
GLUCOSE SERPL-MCNC: 105 MG/DL (ref 70–99)
HCT VFR BLD AUTO: 35.5 % (ref 35–47)
HGB BLD-MCNC: 11.2 G/DL (ref 11.7–15.7)
IMM GRANULOCYTES # BLD: 0.1 10E9/L (ref 0–0.4)
IMM GRANULOCYTES NFR BLD: 0.6 %
LYMPHOCYTES # BLD AUTO: 1.5 10E9/L (ref 0.8–5.3)
LYMPHOCYTES NFR BLD AUTO: 10.6 %
MCH RBC QN AUTO: 26.4 PG (ref 26.5–33)
MCHC RBC AUTO-ENTMCNC: 31.5 G/DL (ref 31.5–36.5)
MCV RBC AUTO: 84 FL (ref 78–100)
MONOCYTES # BLD AUTO: 0.7 10E9/L (ref 0–1.3)
MONOCYTES NFR BLD AUTO: 4.9 %
NEUTROPHILS # BLD AUTO: 11.7 10E9/L (ref 1.6–8.3)
NEUTROPHILS NFR BLD AUTO: 83.7 %
NRBC # BLD AUTO: 0 10*3/UL
NRBC BLD AUTO-RTO: 0 /100
PLATELET # BLD AUTO: 117 10E9/L (ref 150–450)
RBC # BLD AUTO: 4.25 10E12/L (ref 3.8–5.2)
WBC # BLD AUTO: 14 10E9/L (ref 4–11)

## 2018-04-04 PROCEDURE — 25000132 ZZH RX MED GY IP 250 OP 250 PS 637: Performed by: PHYSICIAN ASSISTANT

## 2018-04-04 PROCEDURE — 12000011 ZZH R&B MS OVERFLOW

## 2018-04-04 PROCEDURE — 00000146 ZZHCL STATISTIC GLUCOSE BY METER IP

## 2018-04-04 PROCEDURE — 82947 ASSAY GLUCOSE BLOOD QUANT: CPT | Performed by: SURGERY

## 2018-04-04 PROCEDURE — 36415 COLL VENOUS BLD VENIPUNCTURE: CPT | Performed by: SURGERY

## 2018-04-04 PROCEDURE — 25000128 H RX IP 250 OP 636: Performed by: SURGERY

## 2018-04-04 PROCEDURE — 85025 COMPLETE CBC W/AUTO DIFF WBC: CPT | Performed by: SURGERY

## 2018-04-04 PROCEDURE — 25000132 ZZH RX MED GY IP 250 OP 250 PS 637: Performed by: SURGERY

## 2018-04-04 RX ORDER — DIPHENHYDRAMINE HCL 25 MG
25-50 CAPSULE ORAL EVERY 6 HOURS PRN
Status: DISCONTINUED | OUTPATIENT
Start: 2018-04-04 | End: 2018-04-05 | Stop reason: HOSPADM

## 2018-04-04 RX ORDER — DIPHENHYDRAMINE HYDROCHLORIDE 50 MG/ML
25-50 INJECTION INTRAMUSCULAR; INTRAVENOUS EVERY 6 HOURS PRN
Status: DISCONTINUED | OUTPATIENT
Start: 2018-04-04 | End: 2018-04-05 | Stop reason: HOSPADM

## 2018-04-04 RX ORDER — AMOXICILLIN 250 MG
1 CAPSULE ORAL 2 TIMES DAILY
Status: DISCONTINUED | OUTPATIENT
Start: 2018-04-04 | End: 2018-04-05 | Stop reason: HOSPADM

## 2018-04-04 RX ADMIN — KETOROLAC TROMETHAMINE 15 MG: 15 INJECTION, SOLUTION INTRAMUSCULAR; INTRAVENOUS at 16:14

## 2018-04-04 RX ADMIN — DIPHENHYDRAMINE HYDROCHLORIDE 25 MG: 25 CAPSULE ORAL at 10:31

## 2018-04-04 RX ADMIN — KETOROLAC TROMETHAMINE 15 MG: 15 INJECTION, SOLUTION INTRAMUSCULAR; INTRAVENOUS at 22:37

## 2018-04-04 RX ADMIN — SENNOSIDES AND DOCUSATE SODIUM 1 TABLET: 8.6; 5 TABLET ORAL at 18:08

## 2018-04-04 RX ADMIN — KETOROLAC TROMETHAMINE 15 MG: 15 INJECTION, SOLUTION INTRAMUSCULAR; INTRAVENOUS at 10:39

## 2018-04-04 RX ADMIN — KETOROLAC TROMETHAMINE 15 MG: 15 INJECTION, SOLUTION INTRAMUSCULAR; INTRAVENOUS at 04:53

## 2018-04-04 RX ADMIN — PIPERACILLIN SODIUM AND TAZOBACTAM SODIUM 3.38 G: 3; .375 INJECTION, POWDER, LYOPHILIZED, FOR SOLUTION INTRAVENOUS at 18:06

## 2018-04-04 RX ADMIN — SENNOSIDES AND DOCUSATE SODIUM 1 TABLET: 8.6; 5 TABLET ORAL at 10:28

## 2018-04-04 RX ADMIN — PIPERACILLIN SODIUM AND TAZOBACTAM SODIUM 3.38 G: 3; .375 INJECTION, POWDER, LYOPHILIZED, FOR SOLUTION INTRAVENOUS at 06:07

## 2018-04-04 RX ADMIN — PIPERACILLIN SODIUM AND TAZOBACTAM SODIUM 3.38 G: 3; .375 INJECTION, POWDER, LYOPHILIZED, FOR SOLUTION INTRAVENOUS at 13:02

## 2018-04-04 RX ADMIN — PIPERACILLIN SODIUM AND TAZOBACTAM SODIUM 3.38 G: 3; .375 INJECTION, POWDER, LYOPHILIZED, FOR SOLUTION INTRAVENOUS at 00:26

## 2018-04-04 RX ADMIN — DIPHENHYDRAMINE HYDROCHLORIDE 25 MG: 25 CAPSULE ORAL at 16:14

## 2018-04-04 NOTE — PLAN OF CARE
Problem: Patient Care Overview  Goal: Plan of Care/Patient Progress Review  Outcome: No Change  Pt A&O x4, vitally stable. Weaned O2 from 1L to RA, O2 sats 98%. Capnography WNL, rarely alarmed overnight. Lap sites x2 with dermabond. SATURNINO patent, stripping q4h, dressing shadowing and marked. Voiding adequately. Denies any nausea, tolerating clears- will advance. IVF infusing. Up SBA. Mother staying overnight. Will continue to monitor.

## 2018-04-04 NOTE — PROGRESS NOTES
"[facility]   General Surgery Progress Note           Assessment and Plan:   Assessment:   POD#1 s/p Laparoscopic appendectomy with abscess drainage for perforated stump appendicitis  WBC 13,800 and chills upon admission      Plan:   -ADAT  -pain control:  Switch to PO Oxycodone and Tylenol  -bowel meds:  Senokot  -abx:  Zosyn  -plan to discharge tomorrow AM, will remove drain prior to discharge         Interval History:   Afebrile.  Feels well, no c/o.  Pain controlled with IV Dilaudid.  Up walking ok, voiding ok.  Tolerated oatmeal for breakfast.  + passing flatus.         Physical Exam:   Blood pressure 101/66, pulse 70, temperature 99  F (37.2  C), temperature source Oral, resp. rate 16, height 1.575 m (5' 2\"), weight 68 kg (150 lb), last menstrual period 03/20/2018, SpO2 94 %.    I/O last 3 completed shifts:  In: 3827 [P.O.:520; I.V.:3307]  Out: 1340 [Urine:1225; Drains:95; Blood:20]    Abdomen:   soft, non-distended, tenderness noted in the right lower quadrant and hypoactive bowel sounds   Inc(s) - clean, dry, intact      Jose drain - serosanguinous          Data:       Recent Labs  Lab 04/04/18  0530 04/03/18  0111   HGB 11.2* 13.0       Recent Labs  Lab 04/04/18  0530 04/03/18  0111   WBC 14.0* 13.8*       Maribell Fuller PA-C     Pt seen and examined, agree with above.  Looks and feels better than pre-op  Operative findings reviewed.  Okay for diet ad aziza (GF)  Continue abx  Drain until d/c  Add benadryl and stool softeners  Likely home tomorrow.  "

## 2018-04-04 NOTE — PLAN OF CARE
Problem: Patient Care Overview  Goal: Plan of Care/Patient Progress Review  PRIMARY DIAGNOSIS: Appe   OUTPATIENT/OBSERVATION GOALS TO BE MET BEFORE DISCHARGE:  1. Stable vital signs Yes but weaned down to 1L  2. Tolerating diet:Yes- clears adat   3. Pain controlled with oral pain medications:  No- IV toradol given prn at 4 pm   4. Positive bowel sounds:  Yes  5. Voiding without difficulty:  Yes  6. Able to ambulate:  Yes  7. Provider specific discharge goals met:  No- needs to wean from 1L, ERNIE drain in, getting IV antibiotics     Discharge Planner Nurse   Safe discharge environment identified: Yes  Barriers to discharge: Yes- needs to wean from 1L, ERNIE drain in, getting IV antibiotics        Entered by: Sherrie Partida 04/04/2018 2:21 AM     Please review provider order for any additional goals.   Nurse to notify provider when observation goals have been met and patient is ready for discharge.    3/10 incisional pain- declines meds, heat, or ice. Toradol worked well for patient. LR running at 100. Denies nausea. Aromatherapy patch on. Mom bedside. Has walked halls already. On capno- on 1L. Incentive spirometer bedside and demonstrated. Lap sitesx2 dermabound. Ernie emptied- shadowing on dressing and marked. Getting IV zoysn q6 hours.

## 2018-04-04 NOTE — PLAN OF CARE
"Problem: Patient Care Overview  Goal: Plan of Care/Patient Progress Review  Outcome: Improving  Pt doing well, POD 1 lap appy for perf. stump appendicitis.  Ambulating unit independently.  SATURNINO drain stripped at 1120, 1645, as Pt requested to be pre medicated before stripping the drain, as it was reported as painful.  /65 (BP Location: Left arm)  Pulse 70  Temp 97.6  F (36.4  C) (Oral)  Resp 20  Ht 1.575 m (5' 2\")  Wt 68 kg (150 lb)  LMP 03/20/2018 (Approximate)  SpO2 97%  BMI 27.44 kg/m2   Continues to eat, void without difficulty.      "

## 2018-04-04 NOTE — PLAN OF CARE
Problem: Patient Care Overview  Goal: Plan of Care/Patient Progress Review  Outcome: Improving  Pt doing well POD #1 laparoscopic appy/stump removal.  Pain is well managed with IV toradol, ice packs.  Sites are CDI with dermabond.  SATURNINO site slight shadowing on dressing, will monitor.  Will ambulate today.  + bowel  sounds, + flatus.  Voiding without difficulty.  No nausea.  WBC 14.0 Continued IV Zosyn q 6.

## 2018-04-05 VITALS
BODY MASS INDEX: 27.6 KG/M2 | HEIGHT: 62 IN | SYSTOLIC BLOOD PRESSURE: 112 MMHG | WEIGHT: 150 LBS | RESPIRATION RATE: 18 BRPM | OXYGEN SATURATION: 95 % | HEART RATE: 58 BPM | TEMPERATURE: 98.6 F | DIASTOLIC BLOOD PRESSURE: 71 MMHG

## 2018-04-05 LAB
COPATH REPORT: NORMAL
GLUCOSE SERPL-MCNC: 98 MG/DL (ref 70–99)
WBC # BLD AUTO: 8.6 10E9/L (ref 4–11)

## 2018-04-05 PROCEDURE — 82947 ASSAY GLUCOSE BLOOD QUANT: CPT | Performed by: SURGERY

## 2018-04-05 PROCEDURE — 25000132 ZZH RX MED GY IP 250 OP 250 PS 637: Performed by: PHYSICIAN ASSISTANT

## 2018-04-05 PROCEDURE — 25000128 H RX IP 250 OP 636: Performed by: SURGERY

## 2018-04-05 PROCEDURE — 25000132 ZZH RX MED GY IP 250 OP 250 PS 637: Performed by: SURGERY

## 2018-04-05 PROCEDURE — 36415 COLL VENOUS BLD VENIPUNCTURE: CPT | Performed by: SURGERY

## 2018-04-05 PROCEDURE — 85048 AUTOMATED LEUKOCYTE COUNT: CPT | Performed by: SURGERY

## 2018-04-05 RX ORDER — OXYCODONE HYDROCHLORIDE 5 MG/1
5-10 TABLET ORAL
Qty: 20 TABLET | Refills: 0 | Status: SHIPPED | OUTPATIENT
Start: 2018-04-05 | End: 2018-12-26

## 2018-04-05 RX ORDER — IBUPROFEN 600 MG/1
600 TABLET, FILM COATED ORAL EVERY 6 HOURS PRN
Status: DISCONTINUED | OUTPATIENT
Start: 2018-04-05 | End: 2018-04-05 | Stop reason: HOSPADM

## 2018-04-05 RX ADMIN — PIPERACILLIN SODIUM AND TAZOBACTAM SODIUM 3.38 G: 3; .375 INJECTION, POWDER, LYOPHILIZED, FOR SOLUTION INTRAVENOUS at 05:56

## 2018-04-05 RX ADMIN — OXYCODONE HYDROCHLORIDE 5 MG: 5 TABLET ORAL at 00:55

## 2018-04-05 RX ADMIN — PIPERACILLIN SODIUM AND TAZOBACTAM SODIUM 3.38 G: 3; .375 INJECTION, POWDER, LYOPHILIZED, FOR SOLUTION INTRAVENOUS at 00:50

## 2018-04-05 RX ADMIN — OMEPRAZOLE 20 MG: 20 CAPSULE, DELAYED RELEASE ORAL at 09:57

## 2018-04-05 RX ADMIN — ONDANSETRON 4 MG: 2 INJECTION INTRAMUSCULAR; INTRAVENOUS at 05:53

## 2018-04-05 RX ADMIN — ACETAMINOPHEN 975 MG: 325 TABLET, FILM COATED ORAL at 09:07

## 2018-04-05 RX ADMIN — PIPERACILLIN SODIUM AND TAZOBACTAM SODIUM 3.38 G: 3; .375 INJECTION, POWDER, LYOPHILIZED, FOR SOLUTION INTRAVENOUS at 12:30

## 2018-04-05 RX ADMIN — IBUPROFEN 600 MG: 600 TABLET ORAL at 12:31

## 2018-04-05 RX ADMIN — ACETAMINOPHEN 975 MG: 325 TABLET, FILM COATED ORAL at 00:55

## 2018-04-05 NOTE — PLAN OF CARE
Problem: Patient Care Overview  Goal: Plan of Care/Patient Progress Review  Outcome: Improving  VSS. Ambulating independently. PIV saline locked. Continue IV antibiotics. IV toradol for pain. SATURNINO putting out serosanguinous drainage with clots. SATURNINO stripped at 2045. Voiding. Patient currently resting comfortably.  at bedside. Will continue to monitor and provide for cares.

## 2018-04-05 NOTE — PROGRESS NOTES
"[facility]   General Surgery Progress Note           Assessment and Plan:   Assessment:   POD#2 s/p Laparoscopic appendectomy with abscess drainage for perforated stump appendicitis  WBC 13,80 and chills upon admission; resolved  H/o gastritis, hep B; labs/med review completed      Plan:   -ADAT  -prilosec daily  -pain control: add ibuprofen, also PO Oxycodone and Tylenol  -bowel meds:  Senokot  -abx:  Zosyn  -OK to DC today. DC Rx: oxycodone, augmentin x7days.  Has prilosec at home.  OTC acetaminophen/ibuprofen.  OTC bowel program prn.  RTC 2-3 weeks for postop appt.  Discharge instructions were reviewed with the patient in detail.  All her questions/concerns were addressed.  She is aware that a printed copy of instructions will be given to her upon discharge.         Interval History:   Afebrile.  Had cramping gas pains at the right abdomen overnight which improved with acetaminophen.  Then had oxy on empty stomach and had nausea, improved with antiemetic.  Now feeling quite well.  No nausea, tolerating po intake.  Voiding and passing flatus.  Started stool softener yesterday.  Discussing with RN taking ibuprofen since toradol was very helpful for her initially postop.  She also note h/o \"gastritis\" with prn/rare use of prilosec.  Discussed that if she wants to use ibuprofen, we would increase her prilosec dosing to daily while she is doing this.  Also notes h/o hepatitis B; recent LFTs WNL, so no restriction on acetaminophen needed.          Physical Exam:   Blood pressure 109/73, pulse 58, temperature 98  F (36.7  C), temperature source Oral, resp. rate 16, height 1.575 m (5' 2\"), weight 68 kg (150 lb), last menstrual period 03/20/2018, SpO2 99 %.    I/O last 3 completed shifts:  In: 800 [P.O.:800]  Out: 108 [Drains:108]    Abdomen:   soft, non-distended, non-tender, normal bowel sounds   Incs - clean, dry, skin glue intact      Jose drain - serosanguinous.  Removed without issues, dressing " placed.          Data:     Recent Labs  Lab 04/04/18  0530 04/03/18  0111   HGB 11.2* 13.0       Recent Labs  Lab 04/05/18  0528 04/04/18  0530 04/03/18  0111   WBC 8.6 14.0* 13.8*       Angelique Vu PA-C     Pt seen and examined, agree with above  Looks and feels great  Agree with d/c today  Will review path at follow up/post op

## 2018-04-05 NOTE — DISCHARGE INSTRUCTIONS
HOME CARE FOLLOWING APPENDECTOMY  ANN Caruso, GORAN Shaw, MARIA DEL CARMEN Simeon    Special instructions for Mirta Sims:  --Complete entire antibiotic course.  --Consider eating active culture yogurt or add oral probiotic tablet (over-the-counter) if loose stools.       INCISIONAL CARE:  Replace the bandage over your incisions and previous drain site until all drainage stops, or if more comfortable to have in place.  If Dermabond (a type of skin glue) is present, leave in place until it wears/flakes off.     BATHING:  Avoid baths for 1 week after surgery.  Showers are okay.  You may wash your hair at any time.  Gently pat your incision dry after bathing.    ACTIVITY:  Light Activity -- you may immediately be up and about as tolerated.  Driving -- you may drive when comfortable and off narcotic pain medications.  Light Work -- resume when comfortable off pain medications.  (If you can drive, you probably can work.)  Strenuous Work/Activity -- limit lifting to 20 pounds for 1 week.  Progressively increase with time.  Active Sports (running, biking, etc.) -- cautiously resume after 2 weeks.    DISCOMFORT:  Use pain medications as prescribed by your surgeon.  Take the pain medication with some food, when possible, to minimize side effects.  Intermittent use of ice packs at the incision sites may help during the first 48 hours.  Expect gradual improvement.    DIET:  No restrictions.  Drink plenty of fluids.  While taking pain medications, increase dietary fiber or add a fiber supplementation like Metamucil or Citrucel to help prevent constipation - a possible side effect of pain medications.    NAUSEA:  If nauseated from the anesthetic/pain meds; rest in bed, get up cautiously with assistance, and drink clear liquids (juice, tea, broth).    RETURN APPOINTMENT:  Schedule a follow-up visit 2-3 weeks post-op.  Office Phone:  [phone]     CONTACT US IF THE FOLLOWING DEVELOPS:   1. A fever that is above  101     2. If there is a large amount of drainage, bleeding, or swelling.   3. Severe pain that is not relieved by your prescription.   4. Drainage that is thick, cloudy, yellow, green or white.   5. Any other questions not answered by  Frequently Asked Questions  sheet.      FREQUENTLY ASKED QUESTIONS:    Q:  How should my incision look?    A:  Normally your incision will appear slightly swollen with light redness directly along the incision itself as it heals.  It may feel like a bump or ridge as the healing/scarring happens, and over time (3-4 months) this bump or ridge feeling should slowly go away.  In general, clear or pink watery drainage can be normal at first as your incision heals, but should decrease over time.    Q:  How do I know if my incision is infected?  A:  Look at your incision for signs of infection, like redness around the incision spreading to surrounding skin, or drainage of cloudy or foul-smelling drainage.  If you feel warm, check your temperature to see if you are running a fever.    **If any of these things occur, please notify the nurse at our office.  We may need you to come into the office for an incision check.      Q:  How do I take care of my incision?  A:  If you have a dressing in place - Starting the day after surgery, replace the dressing 1-2 times a day until there is no further drainage from the incision.  At that time, a dressing is no longer needed.  Try to minimize tape on the skin if irritation is occurring at the tape sites.  If you have significant irritation from tape on the skin, please call the office to discuss other method of dressing your incision.    Small pieces of tape called  steri-strips  may be present directly overlying your incision; these may be removed 10 days after surgery unless otherwise specified by your surgeon.  If these tapes start to loosen at the ends, you may trim them back until they fall off or are removed.    A:  If you had  Dermabond  tissue  glue used as a dressing (this causes your incision to look shiny with a clear covering over it) - This type of dressing wears off with time and does not require more dressings over the top unless it is draining around the glue as it wears off.  Do not apply ointments or lotions over the incisions until the glue has completely worn off.    Q:  There is a piece of tape or a sticky  lead  still on my skin.  Can I remove this?  A:  Sometimes the sticky  leads  used for monitoring during surgery or for evaluation in the emergency department are not all removed while you are in the hospital.  These sometimes have a tab or metal dot on them.  You can easily remove these on your own, like taking off a band-aid.  If there is a gel substance under the  lead , simply wipe/clean it off with a washcloth or paper towel.      Q:  What can I do to minimize constipation (very hard stools, or lack of stools)?  A:  Stay well hydrated.  Increase your dietary fiber intake or take a fiber supplement -with plenty of water.  Walk around frequently.  You may consider an over-the-counter stool-softener.  Your Pharmacist can assist you with choosing one that is stocked at your pharmacy.  Constipation is also one of the most common side effects of pain medication.  If you are using pain medication, be pro-active and try to PREVENT problems with constipation by taking the steps above BEFORE constipation becomes a problem.    Q:  What do I do if I need more pain medications?  A:  Call the office to receive refills.  Be aware that certain pain meds cannot be called into a pharmacy and actually require a paper prescription.  A change may be made in your pain med as you progress thru your recovery period or if you have side effects to certain meds.    --Pain meds are NOT refilled after 5pm on weekdays, and NOT AT ALL on the weekends, so please look ahead to prevent problems.      Q:  Why am I having a hard time sleeping now that I am at home?  A:   Many medications you receive while you are in the hospital can impact your sleep for a number of days after your surgery/hospitalization.  Decreased level of activity and naps during the day may also make sleeping at night difficult.  Try to minimize day-time naps, and get up frequently during the day to walk around your home during your recovery time.  Sleep aides may be of some help, but are not recommended for long-term use.      Q:  I am having some back discomfort.  What should I do?  A:  This may be related to certain positioning that was required for your surgery, extended periods of time in bed, or other changes in your overall activity level.  You may try ice, heat, acetaminophen, or ibuprofen to treat this temporarily.  Note that many pain medications have acetaminophen in them and would state this on the prescription bottle.  Be sure not to exceed the maximum of 4000mg per day of acetaminophen.     **If the pain you are having does not resolve, is severe, or is a flare of back pain you have had on other occasions prior to surgery, please contact your primary physician for further recommendations or for an appointment to be examined at their office.    Q:  Why am I having headaches?  A:  Headaches can be caused by many things:  caffeine withdrawal, use of pain meds, dehydration, high blood pressure, lack of sleep, over-activity/exhaustion, flare-up of usual migraine headaches.  If you feel this is related to muscle tension (a band-like feeling around the head, or a pressure at the low-back of the head) you may try ice or heat to this area.  You may need to drink more fluids (try electrolyte drink like Gatorade), rest, or take your usual migraine medications.   **If your headaches do not resolve, worsen, are accompanied by other symptoms, or if your blood pressure is high, please call your primary physician for recommendation and/or examination.    Q:  I am unable to urinate.  What do I do?  A:  A small  percentage of people can have difficulty urinating initially after surgery.  This includes being able to urinate only a very small amount at a time and feeling discomfort or pressure in the very low abdomen.  This is called  urinary retention , and is actually an urgent situation.  Proceed to your nearest Emergency department for evaluation (not an Urgent Care Center).  Sometimes the bladder does not work correctly after certain medications you receive during surgery, or related to certain procedures.  You may need to have a catheter placed until your bladder recovers.  When planning to go to an Emergency department, it may help to call the ER to let them know you are coming in for this problem after a surgery.  This may help you get in quicker to be evaluated.  **If you have symptoms of a urinary tract infection, please contact your primary physician for the proper evaluation and treatment.          If you have other questions, please call the office Monday thru Friday between 8am and 5pm to discuss with the nurse or physician assistant.  #[phone]    There is a surgeon ON CALL on weekday evenings and over the weekend in case of urgent need only, and may be contacted at the same number.    If you are having an emergency, call 911 or proceed to your nearest emergency department.

## 2018-04-05 NOTE — PLAN OF CARE
Problem: Patient Care Overview  Goal: Plan of Care/Patient Progress Review  Outcome: No Change  Afebrile. VSS. Bradycardic at baseline. Pain rated 8/10 controlled with Tylenol and Oxycodone. Abdomen obese, soft, tender in RLQ. BS active and audible all quads. SATURNINO patent and draining serosanguinous fluid. Passing flatus. Voiding. Tolerating ambulation. Tolerating regular diet. Receiving Zosyn. Appeared to sleep comfortably between cares.

## 2018-04-05 NOTE — PLAN OF CARE
Problem: Patient Care Overview  Goal: Plan of Care/Patient Progress Review  Outcome: Improving  Discharge instructions reviewed with patient and spouse. Discharge medications given to patient and spouse. They state they understand all instructions for discharge and follow up. Patient discharged to home.

## 2018-04-06 ENCOUNTER — TELEPHONE (OUTPATIENT)
Dept: PEDIATRICS | Facility: CLINIC | Age: 48
End: 2018-04-06

## 2018-04-06 NOTE — PROGRESS NOTES
Patient was phoned by me with result.  She is feeling well.  She was advised to have a colonoscopy because of the polyp. As luck would have it, she is scheduled for her first screening colonoscopy in May when she turns 48.

## 2018-04-06 NOTE — TELEPHONE ENCOUNTER
"Pt had laparoscopic appendectomy on 04/03.     ED/Discharge Protocol    \"Hi, my name is Siobhan Melton, a registered nurse, and I am calling on behalf of Dr. Holloway's office at [facility].  I am calling to follow up and see how things are going for you after your recent visit.\"    \"I see that you were in the (ER/UC/IP) on 04/05/18.    How are you doing now that you are home?\" Feeling better, able to eat food & keeping food & fluids down. Pain is about 3/10, well managed. Still feeling bloated, but trying to ambulate more.     Is patient experiencing symptoms that may require a hospital visit?  No    Discharge Instructions    \"Let's review your discharge instructions.  What is/are the follow-up recommendations?  Pt. Response: Pt is planning to make a post-op appointment with her surgeon in 2 weeks    \"Were you instructed to make a follow-up appointment?\"  Pt. Response: Yes.  Has appointment been made?   Yes      \"When you see the provider, I would recommend that you bring your discharge instructions with you.    Medications    \"How many new medications are you on since your hospitalization/ED visit?\"    0-1  \"How many of your current medicines changed (dose, timing, name, etc.) while you were in the hospital/ED visit?\"   0-1  \"Do you have questions about your medications?\"   No  \"Were you newly diagnosed with heart failure, COPD, diabetes or did you have a heart attack?\"   No  For patients on insulin: \"Did you start on insulin in the hospital or did you have your insulin dose changed?\"   No    Medication reconciliation completed? Yes    Was MTM referral placed (*Make sure to put transitions as reason for referral)?   No    Call Summary    \"Do you have any questions or concerns about your condition or care plan at the moment?\"    No  Triage nurse advice given: Call us with any questions/concerns    Patient was in ER 1 in the past year (assess appropriateness of ER visits.)      \"If you have questions or things " "don't continue to improve, we encourage you contact us through the main clinic number,  [phone].  Even if the clinic is not open, triage nurses are available 24/7 to help you.     We would like you to know that our clinic has extended hours (provide information).  We also have urgent care (provide details on closest location and hours/contact info)\"      \"Thank you for your time and take care!\"      Barbara, RN  Triage Nurse              "

## 2018-04-06 NOTE — TELEPHONE ENCOUNTER
Please contact patient for In-patient follow up.  [phone] (home) [phone] (work)    Visit date: 040518  Diagnosis listed: Acute Appendicitis With Localized Peritonitis, S/P Laparoscopic Appendectomy  Number of visits in past 12 months: 0 ED / 1 IP

## 2018-04-06 NOTE — DISCHARGE SUMMARY
[facility]    Discharge Summary  Surgery    Date of Admission:  4/3/2018  Date of Discharge:  1/5/2018  Discharging Provider: Clarence Bower MD  Discharge Summary Note completed by: Kandis Hernandez PA-C on 4/6/2018  Date of Service: The patient was personally seen by Discharging Providers on the day of discharge.    Discharge Diagnoses   Active Problems:    Acute appendicitis with peritoneal abscess    Acute appendicitis      Procedure/Surgery Information   Procedure(s):  LAPAROSCOPIC APPENDECTOMY    - Wound Class: IV-Dirty or Infected   Surgeon(s) and Role:     * Clarence Wayne MD - Primary     * Maribell Fuller PA-C - Assisting     Specimens:   ID Type Source Tests Collected by Time Destination   A : APPENDIX Tissue Appendix SURGICAL PATHOLOGY EXAM Clarence Wayne MD 4/3/2018  9:20 AM       Non-operative procedures: None performed       History of Present Illness   Ms. Sims is a healthy 47-year-old female who by report had an uneventful appendectomy 12/20/2016.  She returned with signs and symptoms consistent with an abscess, which was seen on a repeat CT at the time and drained percutaneously by her report also.  She evidently had recurrent abscesses and eventually required a return to the OR in January of last year for attempt at abscess drainage, this was converted to an open procedure.  The patient actually felt well in the meantime, but had 2 days of indolent right lower quadrant pain and chills, which prompted her to seek evaluation in our emergency department overnight.  There she was noted to have a mild leukocytosis of 13.8 thousand.  Subsequent CAT scan showed inflammation in a retrocecal position with what appeared to be a remnant appendix.  Because of these findings and clinical presentation, I offered and recommended a laparoscopy today to better elucidate the process with the potential that she may need an abscess drained and/or a stump appendix  "removed.  Risks of the operation, otherwise discussed in detail, these include infection, bleeding, harm to adjacent structures, need for limited bowel resection, conversion to an open procedure and need for prolonged convalescence in the event of a perforation or abscess were discussed.  The patient verbalized understanding of all the above and consented to proceed.     Hospital Course   Mirta Sims was admitted on 4/3/2018.  The following problems were addressed during her hospitalization:  Patient Active Problem List   Diagnosis     Chronic hepatitis B (H)     Gastritis     History of iron deficiency     Acute appendicitis with peritoneal abscess     Acute appendicitis       Post-operative antibiotic therapy included: Zosyn.  Post-operative pain control: was via IV until able to tolerate PO intake and transitioned to PO pain meds.    Remarkable hospital course events: The patient recovered well from surgery. She was treated with IV antibiotics while in the hospital. She had a postoperative ileus, as expected, which later resolved.    Mirta met all criteria for release on 4/5/2018.  She was afebrile, tolerating diet, pain controlled on PO meds, ambulating well, and had return of bowel function.    Medications discontinued or adjusted during this hospitalization: see discharge med list below.    Antibiotics prescribed at discharge: Augmentin, Duration: 7 days     Imaging study follow up needs:   -No studies require specific follow-up    Discharge Instructions and Follow-Up:  Discharge diet: Regular   Discharge activity: No heavy lifting, pushing, pulling for 3 week(s)   Discharge follow-up: Follow up with Dr. Bower  in 2--3 weeks   Wound/Incision care: Keep wound clean and dry       Kandis Hernandez PA-C      Discharge Disposition   Discharged to home   Condition at discharge: Stable    Pending Results   Final pathology results:  Appendicitis with \"stump appendicitis\", changes consistent with perforation, " serrated polyp negative for dysplasia or malignancy, proximal margin negative.     Unresulted Labs Ordered in the Past 30 Days of this Admission     No orders found from 2/2/2018 to 4/4/2018.          Primary Care Physician   Nayan Holloway    Consultations This Hospital Stay   None    Discharge Orders   No discharge procedures on file.  Discharge Medications   Discharge Medication List as of 4/5/2018  1:08 PM      START taking these medications    Details   oxyCODONE IR (ROXICODONE) 5 MG tablet Take 1-2 tablets (5-10 mg) by mouth every 3 hours as needed for other (pain control or improvement in physical function. Hold dose for analgesic side effects.) Take with food to minimize side effects., Disp-20 tablet, R-0, Local Print      amoxicillin-clavulanate (AUGMENTIN) 875-125 MG per tablet Take 1 tablet by mouth 2 times daily for 7 days, Disp-14 tablet, R-0, E-Prescribe         CONTINUE these medications which have NOT CHANGED    Details   loratadine (CLARITIN) 10 MG tablet Take 10 mg by mouth daily as needed for allergies, Historical      OMEPRAZOLE PO Take 20 mg by mouth daily as needed, Historical      IBUPROFEN PO Take 400 mg by mouth every 6 hours as needed for moderate pain, Historical      vitamin D (ERGOCALCIFEROL) 07277 UNIT capsule Take 1 capsule (50,000 Units) by mouth every 7 days for 8 doses, Disp-8 capsule, R-0, E-Prescribe           Allergies   Allergies   Allergen Reactions     Strawberry Swelling     Tomato Swelling     Azithromycin Rash     Annotation: on face       Data   Most Recent 3 CBC's:  Recent Labs   Lab Test  04/05/18   0528  04/04/18   0530  04/03/18   0111   WBC  8.6  14.0*  13.8*   HGB   --   11.2*  13.0   MCV   --   84  81   PLT   --   117*  136*      Most Recent 3 BMP's:  Recent Labs   Lab Test  04/05/18   0528  04/04/18   0530  04/03/18   0111  02/19/18   0850   NA   --    --   136  140   POTASSIUM   --    --   3.7  3.8   CHLORIDE   --    --   105  108   CO2   --    --   26   24   BUN   --    --   7  13   CR   --    --   0.56  0.60   ANIONGAP   --    --   5  8   RIVERA   --    --   8.5  9.0   GLC  98  105*  105*  99     Most Recent 2 LFT's:  Recent Labs   Lab Test  04/03/18   0111  02/19/18   0850   AST  16  16   ALT  16  17   ALKPHOS  60  65   BILITOTAL  1.5*  0.7     Most Recent INR's and Anticoagulation Dosing History:  Anticoagulation Dose History     There is no flowsheet data to display.        Most Recent 3 Troponin's:No lab results found.  Most Recent Cholesterol Panel:  Recent Labs   Lab Test  02/19/18   0850   CHOL  188   LDL  110*   HDL  61   TRIG  84     Most Recent 6 Bacteria Isolates From Any Culture (See EPIC Reports for Culture Details):No lab results found.  Most Recent TSH, T4 and A1c Labs:No lab results found.  Results for orders placed or performed during the hospital encounter of 04/03/18   CT Abdomen Pelvis without Contrast (stone protocol)    Narrative    CT ABDOMEN AND PELVIS WITHOUT CONTRAST  4/3/2018 1:50 AM     HISTORY: Right flank pain for two days.    COMPARISON: None.    TECHNIQUE: Without intravenous or oral contrast, helical sections were  acquired from the top of the diaphragm through the pubic symphysis.  Coronal reconstructions were generated. Radiation dose for this scan  was reduced using automated exposure control, adjustment of the mA  and/or kV according to the patient's size, or iterative reconstruction  technique. (Renal stone protocol).    FINDINGS:  Right urinary tract: No renal or ureteral calculi. No dilatation of  the intrarenal collecting system or ureter.    Left urinary tract: No renal or ureteral calculi. No dilatation of the  intrarenal collecting system or ureter.    Urinary bladder: No visualized calculi.    Remainder of the abdomen and pelvis: The liver, spleen, pancreas and  adrenal glands are unremarkable to the limits of a noncontrast CT  scan. Prior cholecystectomy. The small and large bowel are normal in  caliber. A blind-ending  tubular structure posterior to the cecum above  the level of the iliac crests likely represents a dilated appendix,  measuring up to 1.5 cm in diameter. Mild haziness is present in the  fat about this structure. These findings are consistent with acute  appendicitis. No extraluminal gas or convincing fluid collection is  present about the appendix. The uterus is present. Bilateral fallopian  tube occlusion devices. No enlarged lymph nodes or free fluid in the  abdomen or pelvis.    Scan through the lower chest is unremarkable.      Impression    IMPRESSION:  1. Probable acute appendicitis. No definite evidence of rupture. Of  note, the appendix is in a retrocecal location above the level of the  iliac crests.  2. No renal or ureteral calculi or evidence of urinary obstruction.    The findings were called to Dr. Cabrera by Dr. Hicks on 4/3/2018 at  0200 hours.    HERMILA HICKS MD

## 2018-04-13 ENCOUNTER — RADIANT APPOINTMENT (OUTPATIENT)
Dept: MAMMOGRAPHY | Facility: CLINIC | Age: 48
End: 2018-04-13
Attending: INTERNAL MEDICINE
Payer: COMMERCIAL

## 2018-04-13 DIAGNOSIS — Z12.39 SCREENING FOR BREAST CANCER: ICD-10-CM

## 2018-04-13 DIAGNOSIS — Z12.31 VISIT FOR SCREENING MAMMOGRAM: ICD-10-CM

## 2018-04-13 PROCEDURE — 77067 SCR MAMMO BI INCL CAD: CPT | Mod: TC

## 2018-04-17 DIAGNOSIS — E55.9 VITAMIN D DEFICIENCY: ICD-10-CM

## 2018-04-17 RX ORDER — ERGOCALCIFEROL 1.25 MG/1
50000 CAPSULE, LIQUID FILLED ORAL
Qty: 8 CAPSULE | Refills: 0 | Status: CANCELLED | OUTPATIENT
Start: 2018-04-17

## 2018-04-17 NOTE — TELEPHONE ENCOUNTER
Requested Prescriptions   Pending Prescriptions Disp Refills     vitamin D (ERGOCALCIFEROL) 27399 UNIT capsule        Last Written Prescription Date:  2/20/2018  Last Fill Quantity: 8,   # refills: 0  Last Office Visit: 2/19/2018  Future Office visit:       Routing refill request to provider for review/approval because:  Drug not on the [facility], P or [facility] refill protocol or controlled substance 8 capsule 0     Sig: Take 1 capsule (50,000 Units) by mouth every 7 days    There is no refill protocol information for this order

## 2018-04-17 NOTE — TELEPHONE ENCOUNTER
Informed patient of below, she agrees with plan. Scheduled lab only for 4/19/18.   Order already entered in chart.

## 2018-04-17 NOTE — TELEPHONE ENCOUNTER
"Received refill request.     Ms. Sims was only supposed to take the high dose weekly x 8 weeks and then have a recheck before starting 2000 IU vit D otc.     Please help her schedule a \"lab only\" so we can recheck (future orders in).     In the meantime okay to start 2000 IU vit d daily.     ANN Holloway MD  Internal Medicine-Pediatrics  "

## 2018-04-27 ENCOUNTER — OFFICE VISIT (OUTPATIENT)
Dept: SURGERY | Facility: CLINIC | Age: 48
End: 2018-04-27
Payer: COMMERCIAL

## 2018-04-27 VITALS
RESPIRATION RATE: 16 BRPM | HEART RATE: 54 BPM | SYSTOLIC BLOOD PRESSURE: 118 MMHG | OXYGEN SATURATION: 97 % | WEIGHT: 150 LBS | BODY MASS INDEX: 27.6 KG/M2 | HEIGHT: 62 IN | DIASTOLIC BLOOD PRESSURE: 78 MMHG

## 2018-04-27 DIAGNOSIS — Z09 SURGICAL FOLLOWUP VISIT: Primary | ICD-10-CM

## 2018-04-27 PROCEDURE — 99024 POSTOP FOLLOW-UP VISIT: CPT | Performed by: PHYSICIAN ASSISTANT

## 2018-04-27 NOTE — PROGRESS NOTES
"4/27/2018    [facility] Note     Subjective:  Mirta Sims is here for her first postoperative visit. She underwent a laparoscopic appendectomy by Dr. Bower on 4/3/2018. Intraoperative findings included \"stump\" appendicitis, as she had underwent partial appendectomy last year at another facility.  Today she  tells me she has been feeling well since surgery. She currently does not require narcotic pain medications, she is eating a normal diet and her bowels are regular. Her only complaint today is mild left flank tenderness since yesterday.  This is manageable with Ibuprofen.  She reports doing lots of walking the day before onset.    Objective:  Abd - Abdomen soft, non-tender.   Inc - Healing well, well approximated and without signs of infection    Assessment:  S/p laparoscopic appendectomy. The pathology confirms acute stump appendicitis.  Possible muscle strain at left flank    Plan:  RTC PRN  Monitor left flank discomfort, call PCP if worsens    Maribell Fuller PA-C      Please route or send letter to:  Primary Care Provider (PCP)      "

## 2018-04-27 NOTE — MR AVS SNAPSHOT
After Visit Summary   4/27/2018    Mirta Sims    MRN: [id number]           Patient Information     Date Of Birth          1970        Visit Information        Provider Department      4/27/2018 3:30 PM Maribell Fuller PA-C [facility]      Today's Diagnoses     Surgical followup visit    -  1       Follow-ups after your visit        Follow-up notes from your care team     Return if symptoms worsen or fail to improve.      Your next 10 appointments already scheduled     May 03, 2018  4:00 PM CDT   LAB with EA LAB   [facility] ([facility])    [address] [place] [address]  [address]  [facility] [address] [phone]           Please do not eat 10-12 hours before your appointment if you are coming in fasting for labs on lipids, cholesterol, or glucose (sugar). This does not apply to pregnant women. Water, hot tea and black coffee (with nothing added) are okay. Do not drink other fluids, diet soda or chew gum.              Who to contact     If you have questions or need follow up information about today's clinic visit or your schedule please contact [facility] directly at [phone].  Normal or non-critical lab and imaging results will be communicated to you by MyChart, letter or phone within 4 business days after the clinic has received the results. If you do not hear from us within 7 days, please contact the clinic through [employer] or phone. If you have a critical or abnormal lab result, we will notify you by phone as soon as possible.  Submit refill requests through [employer] or call your pharmacy and they will forward the refill request to us. Please allow 3 business days for your refill to be completed.          Additional Information About Your Visit        MyChart Information     [employer] gives you secure access to your electronic health record. If you see a primary care  "provider, you can also send messages to your care team and make appointments. If you have questions, please call your primary care clinic.  If you do not have a primary care provider, please call [phone] and they will assist you.        Care EveryWhere ID     This is your Care EveryWhere ID. This could be used by other organizations to access your [facility] medical records  [id number]        Your Vitals Were     Pulse Respirations Height Pulse Oximetry BMI (Body Mass Index)       54 16 1.575 m (5' 2\") 97% 27.44 kg/m2        Blood Pressure from Last 3 Encounters:   04/27/18 118/78   04/05/18 112/71   02/19/18 114/68    Weight from Last 3 Encounters:   04/27/18 68 kg (150 lb)   04/03/18 68 kg (150 lb)   02/19/18 71.9 kg (158 lb 9.6 oz)              Today, you had the following     No orders found for display       Primary Care Provider Office Phone # Fax #    Nayan Kieran Holloway [phone]       [facility] [address]        Equal Access to Services     [facility] : Hadii aad ku hadasho Soomaali, waaxda luqadaha, qaybta kaalmada ademiguelyada, gege dos santos . So [facility] [phone].    ATENCIÓN: Si habla español, tiene a dowell disposición servicios gratuitos de asistencia lingüística. Radha al [phone].    We comply with applicable federal civil rights laws and [place] laws. We do not discriminate on the basis of race, color, national origin, age, disability, sex, sexual orientation, or gender identity.            Thank you!     Thank you for choosing [facility]  for your care. Our goal is always to provide you with excellent care. Hearing back from our patients is one way we can continue to improve our services. Please take a few minutes to complete the written survey that you may receive in the mail after your visit with us. Thank you!             Your Updated Medication List - Protect others around you: Learn how to " safely use, store and throw away your medicines at www.disposemymeds.org.          This list is accurate as of 4/27/18 11:59 PM.  Always use your most recent med list.                   Brand Name Dispense Instructions for use Diagnosis    IBUPROFEN PO      Take 400 mg by mouth every 6 hours as needed for moderate pain        loratadine 10 MG tablet    CLARITIN     Take 10 mg by mouth daily as needed for allergies        OMEPRAZOLE PO      Take 20 mg by mouth daily as needed        oxyCODONE IR 5 MG tablet    ROXICODONE    20 tablet    Take 1-2 tablets (5-10 mg) by mouth every 3 hours as needed for other (pain control or improvement in physical function. Hold dose for analgesic side effects.) Take with food to minimize side effects.    Acute appendicitis with localized peritonitis, S/P laparoscopic appendectomy

## 2018-05-03 DIAGNOSIS — E55.9 VITAMIN D DEFICIENCY: ICD-10-CM

## 2018-05-03 PROCEDURE — 82306 VITAMIN D 25 HYDROXY: CPT | Performed by: INTERNAL MEDICINE

## 2018-05-03 PROCEDURE — 36415 COLL VENOUS BLD VENIPUNCTURE: CPT | Performed by: INTERNAL MEDICINE

## 2018-05-04 LAB — DEPRECATED CALCIDIOL+CALCIFEROL SERPL-MC: 24 UG/L (ref 20–75)

## 2018-05-25 ENCOUNTER — TRANSFERRED RECORDS (OUTPATIENT)
Dept: HEALTH INFORMATION MANAGEMENT | Facility: CLINIC | Age: 48
End: 2018-05-25

## 2018-05-30 PROBLEM — K64.4 EXTERNAL HEMORRHOIDS: Status: ACTIVE | Noted: 2018-05-30

## 2018-11-07 ENCOUNTER — TRANSFERRED RECORDS (OUTPATIENT)
Dept: HEALTH INFORMATION MANAGEMENT | Facility: CLINIC | Age: 48
End: 2018-11-07

## 2018-12-04 ENCOUNTER — TRANSFERRED RECORDS (OUTPATIENT)
Dept: HEALTH INFORMATION MANAGEMENT | Facility: CLINIC | Age: 48
End: 2018-12-04

## 2018-12-06 PROBLEM — K35.80 ACUTE APPENDICITIS: Status: RESOLVED | Noted: 2018-04-03 | Resolved: 2018-12-06

## 2018-12-06 PROBLEM — Z87.19 HISTORY OF APPENDICITIS: Status: ACTIVE | Noted: 2018-04-03

## 2018-12-26 ENCOUNTER — OFFICE VISIT (OUTPATIENT)
Dept: PODIATRY | Facility: CLINIC | Age: 48
End: 2018-12-26
Payer: COMMERCIAL

## 2018-12-26 VITALS
HEIGHT: 62 IN | WEIGHT: 150 LBS | SYSTOLIC BLOOD PRESSURE: 110 MMHG | DIASTOLIC BLOOD PRESSURE: 70 MMHG | BODY MASS INDEX: 27.6 KG/M2

## 2018-12-26 DIAGNOSIS — M72.2 PLANTAR FASCIAL FIBROMATOSIS: ICD-10-CM

## 2018-12-26 DIAGNOSIS — M79.671 PAIN OF BOTH HEELS: Primary | ICD-10-CM

## 2018-12-26 DIAGNOSIS — M79.672 PAIN OF BOTH HEELS: Primary | ICD-10-CM

## 2018-12-26 PROCEDURE — 99203 OFFICE O/P NEW LOW 30 MIN: CPT | Performed by: PODIATRIST

## 2018-12-26 ASSESSMENT — MIFFLIN-ST. JEOR: SCORE: 1253.65

## 2018-12-26 NOTE — LETTER
2018         RE: Mirta Sims  [address]  [place] [address]        Dear Colleague,    Thank you for referring your patient, Mirta Sims, to the [facility]. Please see a copy of my visit note below.      ASSESSMENT/PLAN:    Encounter Diagnoses   Name Primary?     Pain of both heels Yes     Plantar fascial fibromatosis      The patient was educated about the causes and nature of heel pain.  The anatomy and function of the plantar fascia was reviewed. The treatment plan discussed included icing, calf and plantar fascial stretching, avoidance of barefoot walking, wearing sturdy, supportive athletic-type shoes, activity modification, and arch supports versus custom orthoses.      Pt is referred to the [facility] for prescription orthoses.        Body mass index is 27.44 kg/m .    Weight management plan: Patient was referred to their PCP to discuss a diet and exercise plan.      Rolly Herrera DPM, FACFAS, MS    [place]le [facility]      ____________________________________________________________________    HPI:         Chief Complaint: pain in both feet; she specifies the heels, left more painful than right.  Onset of problem: 2 months  Pain/ discomfort is described as:  burning  Ratin/10   Frequency:  daily    The pain is made worse with walking, standing  Previous treatment: Tylenol  *  Patient Active Problem List   Diagnosis     Chronic hepatitis B (H)     Gastritis     History of iron deficiency     History of appendicitis with peritoneal abscess     External hemorrhoids   *  *  Past Surgical History:   Procedure Laterality Date     APPENDECTOMY  2016     CHOLECYSTECTOMY       HC HYSTEROS W PERMANENT FALLOPAIN IMPLANT       LAPAROSCOPIC APPENDECTOMY N/A 4/3/2018    Procedure: LAPAROSCOPIC APPENDECTOMY;  LAPAROSCOPIC APPENDECTOMY   ;  Surgeon: Clarence Wayne MD;  Location: [facility] OR     LAPAROTOMY  "EXPLORATORY  01/22/2017    Procedure: LAPAROSCOPY CONVERTED TO EXPLORATORY LAPAROTOMY WITH EVACUATION OF RETROCOLONIC ABSCESS X 2; Surgeon: Johnnie Dinero DO; Location: [facility] OR; Service: General     MESH (IMPLANTABLE)  01/22/2017    Film Anti Adhesion Sepra 4301-02 - Sn.A. Abdomen   *  *  Current Outpatient Medications   Medication Sig Dispense Refill     IBUPROFEN PO Take 400 mg by mouth every 6 hours as needed for moderate pain       loratadine (CLARITIN) 10 MG tablet Take 10 mg by mouth daily as needed for allergies       OMEPRAZOLE PO Take 20 mg by mouth daily as needed         ROS:     A 10-point review of systems was performed and is positive for that noted above in the HPI and as seen below.  All other areas are negative.     Numbness in feet?  no   Calf pain with walking? no  Recent foot/ankle injury? no  Weight change over past 12 months? 10# gain  Self perception as overweight? yes  Recent flu-like symptoms? fever  Joint pain other than feet ? Knee, \"but getting better now.\"    Social History: Employment:  yes;  Exercise/Physical activity:  3x/ week;  Tobacco use:  no  Social History     Socioeconomic History     Marital status:      Spouse name: Not on file     Number of children: Not on file     Years of education: Not on file     Highest education level: Not on file   Social Needs     Financial resource strain: Not on file     Food insecurity - worry: Not on file     Food insecurity - inability: Not on file     Transportation needs - medical: Not on file     Transportation needs - non-medical: Not on file   Occupational History     Not on file   Tobacco Use     Smoking status: Never Smoker     Smokeless tobacco: Never Used   Substance and Sexual Activity     Alcohol use: Yes     Comment: occasional      Drug use: No     Sexual activity: Yes     Partners: Male     Birth control/protection: Female Surgical   Other Topics Concern     Parent/sibling w/ CABG, MI or angioplasty before 65F " "55M? 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     Other - See Comments Brother         Liver transplant (? hep b)       Rheumatoid arthritis:  no  Foot Problems: parent  Diabetes: parent      EXAM:    Vitals: /70   Ht 1.575 m (5' 2\")   Wt 68 kg (150 lb)   BMI 27.44 kg/m     BMI: Body mass index is 27.44 kg/m .  Height: 5' 2\"    Constitutional/ general:  Pt is in no apparent distress, appears well-nourished.  Cooperative with history and physical exam.     Vascular:  Pedal pulses are palpable bilaterally for both the DP and PT arteries.  CFT < 3 sec.  No edema.  Pedal hair growth noted.     Neuro:  Alert and oriented x 3. Coordinated gait.  Light touch sensation is intact to the L4, L5, S1 distributions. No obvious deficits.  No evidence of neurological-based weakness, spasticity, or contracture in the lower extremities.     Derm: Normal texture and turgor.  No erythema, ecchymosis, or cyanosis.  No open lesions.     Musculoskeletal:    Lower extremity muscle strength is normal.  Patient is ambulatory without an assistive device or brace .  No gross deformities.  Pain on palpation to the plantar central and medial aspect of the bilateral heel.  No significant pain with   side-to-side compression of the heel.  No nodularity noted.      Rolly Herrera DPM, FACFAS, MS    [place] [facility] & [facility]le [facility]                Again, thank you for allowing me to participate in the care of your patient.        Sincerely,        Rolly Herrera DPM    "

## 2018-12-26 NOTE — PROGRESS NOTES
ASSESSMENT/PLAN:    Encounter Diagnoses   Name Primary?     Pain of both heels Yes     Plantar fascial fibromatosis      The patient was educated about the causes and nature of heel pain.  The anatomy and function of the plantar fascia was reviewed. The treatment plan discussed included icing, calf and plantar fascial stretching, avoidance of barefoot walking, wearing sturdy, supportive athletic-type shoes, activity modification, and arch supports versus custom orthoses.      Pt is referred to the [facility] for prescription orthoses.        Body mass index is 27.44 kg/m .    Weight management plan: Patient was referred to their PCP to discuss a diet and exercise plan.      Rolly Herrera DPM, FACFAS, MS    [facility]      ____________________________________________________________________    HPI:         Chief Complaint: pain in both feet; she specifies the heels, left more painful than right.  Onset of problem: 2 months  Pain/ discomfort is described as:  burning  Ratin/10   Frequency:  daily    The pain is made worse with walking, standing  Previous treatment: Tylenol  *  Patient Active Problem List   Diagnosis     Chronic hepatitis B (H)     Gastritis     History of iron deficiency     History of appendicitis with peritoneal abscess     External hemorrhoids   *  *  Past Surgical History:   Procedure Laterality Date     APPENDECTOMY  2016     CHOLECYSTECTOMY       HC HYSTEROS W PERMANENT FALLOPAIN IMPLANT       LAPAROSCOPIC APPENDECTOMY N/A 4/3/2018    Procedure: LAPAROSCOPIC APPENDECTOMY;  LAPAROSCOPIC APPENDECTOMY   ;  Surgeon: Clarence Wayne MD;  Location:  OR     LAPAROTOMY EXPLORATORY  2017    Procedure: LAPAROSCOPY CONVERTED TO EXPLORATORY LAPAROTOMY WITH EVACUATION OF RETROCOLONIC ABSCESS X 2; Surgeon: Johnnie Dinero DO; Location: [facility] OR; Service: General     MESH (IMPLANTABLE)  2017     "Film Anti Adhesion Sepra 4301-02 - Sn.A. Abdomen   *  *  Current Outpatient Medications   Medication Sig Dispense Refill     IBUPROFEN PO Take 400 mg by mouth every 6 hours as needed for moderate pain       loratadine (CLARITIN) 10 MG tablet Take 10 mg by mouth daily as needed for allergies       OMEPRAZOLE PO Take 20 mg by mouth daily as needed         ROS:     A 10-point review of systems was performed and is positive for that noted above in the HPI and as seen below.  All other areas are negative.     Numbness in feet?  no   Calf pain with walking? no  Recent foot/ankle injury? no  Weight change over past 12 months? 10# gain  Self perception as overweight? yes  Recent flu-like symptoms? fever  Joint pain other than feet ? Knee, \"but getting better now.\"    Social History: Employment:  yes;  Exercise/Physical activity:  3x/ week;  Tobacco use:  no  Social History     Socioeconomic History     Marital status:      Spouse name: Not on file     Number of children: Not on file     Years of education: Not on file     Highest education level: Not on file   Social Needs     Financial resource strain: Not on file     Food insecurity - worry: Not on file     Food insecurity - inability: Not on file     Transportation needs - medical: Not on file     Transportation needs - non-medical: Not on file   Occupational History     Not on file   Tobacco Use     Smoking status: Never Smoker     Smokeless tobacco: Never Used   Substance and Sexual Activity     Alcohol use: Yes     Comment: occasional      Drug use: No     Sexual activity: Yes     Partners: Male     Birth control/protection: Female Surgical   Other Topics Concern     Parent/sibling w/ CABG, MI or angioplasty before 65F 55M? 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     Other - See Comments Brother         Liver transplant (? hep b)       Rheumatoid arthritis:  no  Foot Problems: parent  Diabetes: parent      EXAM:    Vitals: /70   Ht 1.575 m (5' 2\")   Wt 68 kg (150 lb)   BMI 27.44 kg/m    BMI: Body mass index is 27.44 kg/m .  Height: 5' 2\"    Constitutional/ general:  Pt is in no apparent distress, appears well-nourished.  Cooperative with history and physical exam.     Vascular:  Pedal pulses are palpable bilaterally for both the DP and PT arteries.  CFT < 3 sec.  No edema.  Pedal hair growth noted.     Neuro:  Alert and oriented x 3. Coordinated gait.  Light touch sensation is intact to the L4, L5, S1 distributions. No obvious deficits.  No evidence of neurological-based weakness, spasticity, or contracture in the lower extremities.     Derm: Normal texture and turgor.  No erythema, ecchymosis, or cyanosis.  No open lesions.     Musculoskeletal:    Lower extremity muscle strength is normal.  Patient is ambulatory without an assistive device or brace .  No gross deformities.  Pain on palpation to the plantar central and medial aspect of the bilateral heel.  No significant pain with   side-to-side compression of the heel.  No nodularity noted.      Rolly Herrera DPM, FACFAS, MS [facility] & [facility]le [facility]              "

## 2018-12-26 NOTE — PATIENT INSTRUCTIONS
Thank you for choosing [facility]!    DR. MELGAR'S CLINIC LOCATIONS     MONDAY - [place] WEDNESDAY - [place]   [address] [place] [address] SHERITA Aguilar [address]   [phone] / [phone] / [phone]       THURSDAY - [place] SCHEDULE SURGERY: [phone]   [address] APPOINTMENTS: [phone]   [place] [address] BILLING QUESTIONS: [phone] / [phone]       HEEL PAIN TIPS    1)  A rigid-soled, athletic shoe like a hiking shoe is recommended.    2)  Avoid barefoot walking at home. It is a good idea to wear a clean athletic shoe inside.    3)  Stretch your calf muscules and plantar fascia frequently. It is a good idea to do this before getting out of bed.    4)  Ice and ibuprofen can help if pain is related to inflammation.    5)  Some good over the counter inserts might help, see the handout in this packet on our recommendations    * If your pain persists, please return to clinic. Future options include a walking boot, physical therapy, night splints, and injections.    [facility] LOCATIONS  [facility]  [address]  [place] [address]  Phone: [phone]  Fax: [phone] [facility]  [address]  [place] [address]  Phone: [phone]   Fax: [phone]   [facility]  [address]  [place] [address]  Phone: [phone]  Fax: [phone] [facility]  [address]  [place] [address]  Phone: [phone]   Fax: [phone]   [facility]   [address]  [place] [address]  Phone: [phone]   Fax: [phone] * Please call any location listed to make an appointment for a casting/fitting. Your referral was sent to their central office and they will all have the order on file.     PLANTAR  FASCIITIS    Plantar fasciitis is often referred to as heel spurs or heel pain. Plantar fasciitis is a very common problem that affects people of all foot shapes, age, weight and activity level. Pain may be in the arch or on the weight-bearing surface of the heel. The pain may come on without injury or identifiable cause. Pain is generally present when first getting out of bed in the morning or up from a seated break.     CAUSES  The plantar fascia is a dense fibrous band of tissue that stretches across the bottom surface of the foot. The fascia helps support the foot muscles and arch. Plantar fasciitis is thought to be caused by mechanical strain or overload. Frequent walking without shoes or wearing unsupportive shoes is thought to cause structural overload and ultimately inflammation of the plantar fascia. Some people have heel spurs that can be seen on x-ray. The heel spur is actually a minor component of plantar fascitis and is largely ignored.       SELF TREATMENT   The easiest solution is to stop walking around your home without shoes. Plantar fasciitis is largely a shoe problem. Shoes are either not being worn often enough or your current shoes are inadequate for your weight, foot structure or activity level. The majority of shoes on the market today are not sufficient to resist development of plantar fasciitis or to promote healing. Assume that your current shoes are inadequate and will need to be replaced. Even high quality shoes wear out with 6 months to one year of frequent use. Weight loss is another option. Losing ten pounds in the next two months may be enough to resolve the problem. Ice applied to the area of pain two to three times per day for ten minutes each session can be very helpful. This should continue until the problem resolves. Achilles tendon stretching is essential. Stretch multiple times daily to promote healing and to prevent recurrence in the future.     MEDICAL TREATMENT  Medical  treatments often include custom arch supports, cortisone injections, physical therapy, splints to be worn in bed, prescription medications and surgery. The home treatments listed above will be necessary regardless of these advanced medical treatments. Surgery is rarely needed but is very helpful in selected cases.     PROGNOSIS  Plantar fasciitis can last from one day to a lifetime. Some people get intermittent fascitis that is very short-lived. Others suffer daily for years. Excessive body weight, frequent bare foot walking, long hours on the feet, inadequate shoes, predisposing foot structures and excessive activity such as running are all potential issues that lead to chronic and/or recurring plantar fascitis. Having plantar fasciitis means that you are forever prone to this problem and will require modification of some of the above factors. Most people seek treatment within one to four months. Healing usually requires a similar one to four month time frame. Healing time is relative to the amount of effort spent treating the problem.   Plantar fasciitis is highly recurrent. Risk factors often continue, including return to bare foot walking, inadequate shoes, excessive body weight, excessive activities, etc. Your life style and foot structure may predispose you to recurrent plantar fasciitis. A daily prevention regimen can be very helpful. Ongoing use of shoe inserts, careful attention to appropriate shoes, daily Achilles stretching, etc. may prevent recurrence. Prompt attention at the earliest warning signs of heel pain can resolve the problem in as short as a few days.     EXERCISES    Stair Exercise: Step on the stairs with the ball of your foot and hold your position for at least 15 seconds, then slowly step down with the heels of your foot. You can do this daily and as often as you want.   Picking the Towel: Sit comfortably and then pick the towel up with your toes. You can use any object other than a towel  as long as the material can be soft and you can pick it up with your toes.  Rolling the Bottle: Use a small ball or frozen water bottle and then roll it around with your foot.   Flex the Toes: Sit comfortably and then flex your toes by pointing it towards the floor or towards your body. This will relax and flex your foot and exercise your plantar fascia, the calf, and the Achilles tendon. The inability of the foot to stretch often causes the bunching up of the plantar fascia area leading to the pain.  Calf/Achilles Stretching: Lay on you back and raise one foot, then point your toes towards the floor. See photo below:               Hold each stretch for 10 seconds. Stretch 10 times per set, three sets per day. Morning, afternoon and evening. If your heel pain is very severe in the morning, consider doing the first set of stretches before you get out of bed.    THERAPIES COVERED:  1.  Supportive Shoes: minimizing barefoot ambulation helps to provide cushion, padding and support to the ligament that is inflamed. Socks, flip flops, flats and some slippers are not typically sufficient to provide support. Shoes should be worn even indoors  2.  Insert/Orthotics: ones with an arch support built in to them provide further stress relief for the ligament. See the information below on recommended inserts.  3. Icing: using a frozen water bottle or orange, and rolling it along the bottom of the arch/heel can help to alleviate discomfort, and can act as a tissue massage to the painful, inflamed ligament.  There is evidence that shows icing at least three times daily can be beneficial  4.  Antiinflammatory (NSAID): Ibuprofen, Aleve, as well as Tylenol can be used to help decrease symptoms and improve pain levels. If you have high blood pressure, heart disease, stomach or kidney problems, use antiinflammatories sparingly. Tylenol should not be used if you have liver problems.   5. Activity Modifications: if there are certain things  that you do, whether it's going barefoot or certain shoes/activities, you should try to minimize those activities as much as possible until your symptoms are sufficiently resolved. Certainly, some activities, such as running on the treadmill, are easier to take a break from versus others, such as work or chores at home. If there are certain activities that hurt your heel, and you keep doing those activities that hurt your heel, your heel will keep hurting.  **If these initial therapies are insufficient, we have our tier 2 therapies that can more aggressively work to improve your symptoms and get you back to the activities that you enjoy!    OVER THE COUNTER INSERTS    SuperFeet   Sofsole Fit Spenco   Power Step   Walk-Fit Arch Cradles     Most of these can be found at your local [employer], sporting [employer] stores, or online.  **A good high quality over the counter insert should cost around $40-$50      [employer] [facility]  [address] [place] [address]  [phone]   [address]  [phone] [place]  [address] [place] [address]  [phone]   [place]  [address]  [phone]   [place]  [address] [address] [facility]  [phone] [place]  [address] [place] [address] [place].  [phone]   [place]  [address] [facility]  [phone]   [place]  [address] [facility]  [phone] [place]  [address] [phone]               FYI: BODY MASS INDEX (BMI)  Many things can cause foot and ankle problems. Foot structure, activity level, foot mechanics and injuries are common causes of pain. One very important issue that often goes unmentioned, is body weight. Extra weight can cause increased stress on muscles, ligaments, bones and tendons. Sometimes just a few extra pounds is all it takes to put one over her/his threshold. Without reducing that stress, it can be difficult to alleviate pain. Some people are uncomfortable addressing this issue, but we feel it is important for  you to think about it. As Foot & Ankle specialists, our job is addressing the lower extremity problem and possible causes. Regarding extra body weight, we encourage patients to discuss diet and weight management plans with their primary care doctors. It is this team approach that gives you the best opportunity for pain relief and getting you back on your feet.

## 2019-02-22 ENCOUNTER — OFFICE VISIT (OUTPATIENT)
Dept: PEDIATRICS | Facility: CLINIC | Age: 49
End: 2019-02-22
Payer: COMMERCIAL

## 2019-02-22 VITALS
BODY MASS INDEX: 29.81 KG/M2 | TEMPERATURE: 98.2 F | SYSTOLIC BLOOD PRESSURE: 102 MMHG | OXYGEN SATURATION: 98 % | DIASTOLIC BLOOD PRESSURE: 70 MMHG | HEART RATE: 58 BPM | WEIGHT: 162 LBS | HEIGHT: 62 IN

## 2019-02-22 DIAGNOSIS — Z12.31 ENCOUNTER FOR SCREENING MAMMOGRAM FOR BREAST CANCER: ICD-10-CM

## 2019-02-22 DIAGNOSIS — B18.1 CHRONIC HEPATITIS B (H): ICD-10-CM

## 2019-02-22 DIAGNOSIS — Z00.00 ROUTINE GENERAL MEDICAL EXAMINATION AT A HEALTH CARE FACILITY: Primary | ICD-10-CM

## 2019-02-22 DIAGNOSIS — N39.3 FEMALE STRESS INCONTINENCE: ICD-10-CM

## 2019-02-22 DIAGNOSIS — Z12.4 SCREENING FOR CERVICAL CANCER: ICD-10-CM

## 2019-02-22 DIAGNOSIS — K64.4 EXTERNAL HEMORRHOIDS: ICD-10-CM

## 2019-02-22 DIAGNOSIS — Z13.1 SCREENING FOR DIABETES MELLITUS: ICD-10-CM

## 2019-02-22 DIAGNOSIS — Z86.2 HISTORY OF ANEMIA: ICD-10-CM

## 2019-02-22 LAB
ERYTHROCYTE [DISTWIDTH] IN BLOOD BY AUTOMATED COUNT: 12.8 % (ref 10–15)
GLUCOSE SERPL-MCNC: 90 MG/DL (ref 70–99)
HCT VFR BLD AUTO: 41.7 % (ref 35–47)
HGB BLD-MCNC: 13.8 G/DL (ref 11.7–15.7)
MCH RBC QN AUTO: 29.1 PG (ref 26.5–33)
MCHC RBC AUTO-ENTMCNC: 33.1 G/DL (ref 31.5–36.5)
MCV RBC AUTO: 88 FL (ref 78–100)
PLATELET # BLD AUTO: 180 10E9/L (ref 150–450)
RBC # BLD AUTO: 4.75 10E12/L (ref 3.8–5.2)
WBC # BLD AUTO: 8.8 10E9/L (ref 4–11)

## 2019-02-22 PROCEDURE — G0145 SCR C/V CYTO,THINLAYER,RESCR: HCPCS | Performed by: INTERNAL MEDICINE

## 2019-02-22 PROCEDURE — 90715 TDAP VACCINE 7 YRS/> IM: CPT | Performed by: INTERNAL MEDICINE

## 2019-02-22 PROCEDURE — 90471 IMMUNIZATION ADMIN: CPT | Performed by: INTERNAL MEDICINE

## 2019-02-22 PROCEDURE — 99396 PREV VISIT EST AGE 40-64: CPT | Performed by: INTERNAL MEDICINE

## 2019-02-22 PROCEDURE — 87624 HPV HI-RISK TYP POOLED RSLT: CPT | Performed by: INTERNAL MEDICINE

## 2019-02-22 PROCEDURE — 85027 COMPLETE CBC AUTOMATED: CPT | Performed by: INTERNAL MEDICINE

## 2019-02-22 PROCEDURE — 36415 COLL VENOUS BLD VENIPUNCTURE: CPT | Performed by: INTERNAL MEDICINE

## 2019-02-22 PROCEDURE — 82947 ASSAY GLUCOSE BLOOD QUANT: CPT | Performed by: INTERNAL MEDICINE

## 2019-02-22 ASSESSMENT — ENCOUNTER SYMPTOMS
HEADACHES: 0
PARESTHESIAS: 0
HEMATOCHEZIA: 0
ABDOMINAL PAIN: 0
COUGH: 0
HEMATURIA: 0
DYSURIA: 0
MYALGIAS: 0
BREAST MASS: 0
PALPITATIONS: 0
EYE PAIN: 0
SHORTNESS OF BREATH: 0
NAUSEA: 0
ARTHRALGIAS: 0
WEAKNESS: 0
JOINT SWELLING: 0
HEARTBURN: 0
CONSTIPATION: 0
SORE THROAT: 0
CHILLS: 0
FREQUENCY: 0
FEVER: 0
DIZZINESS: 1
NERVOUS/ANXIOUS: 0
DIARRHEA: 0

## 2019-02-22 ASSESSMENT — MIFFLIN-ST. JEOR: SCORE: 1308.08

## 2019-02-22 NOTE — PATIENT INSTRUCTIONS
"It was nice to see you in clinic.    Mammogram should call you to schedule.    Please stop at the lab on your way out of clinic. I will be in touch with your results.    Ultrasound every 6 months with [facility] - last one was in December. You will need to call them.     Kegel exercises for the stress incontinence  Can also consider pelvic physical therapy - let me know if you're interested and I can put in a referral    I think you are \"perimenopausal\" - let me know if you have a really heavy period again and we can do an ultrasound.     Preventive Health Recommendations  Female Ages 50 - 64    Yearly exam: See your health care provider every year in order to  o Review health changes.   o Discuss preventive care.    o Review your medicines if your doctor has prescribed any.      Get a Pap test every three years (unless you have an abnormal result and your provider advises testing more often).    If you get Pap tests with HPV test, you only need to test every 5 years, unless you have an abnormal result.     You do not need a Pap test if your uterus was removed (hysterectomy) and you have not had cancer.    You should be tested each year for STDs (sexually transmitted diseases) if you're at risk.     Have a mammogram every 1 to 2 years.    Have a colonoscopy at age 50, or have a yearly FIT test (stool test). These exams screen for colon cancer.      Have a cholesterol test every 5 years, or more often if advised.    Have a diabetes test (fasting glucose) every three years. If you are at risk for diabetes, you should have this test more often.     If you are at risk for osteoporosis (brittle bone disease), think about having a bone density scan (DEXA).    Shots: Get a flu shot each year. Get a tetanus shot every 10 years.    Nutrition:     Eat at least 5 servings of fruits and vegetables each day.    Eat whole-grain bread, whole-wheat pasta and brown rice instead of white grains and rice.    Get adequate Calcium and " Vitamin D.     Lifestyle    Exercise at least 150 minutes a week (30 minutes a day, 5 days a week). This will help you control your weight and prevent disease.    Limit alcohol to one drink per day.    No smoking.     Wear sunscreen to prevent skin cancer.     See your dentist every six months for an exam and cleaning.    See your eye doctor every 1 to 2 years.

## 2019-02-22 NOTE — PROGRESS NOTES
SUBJECTIVE:   CC: Mirta Sims is an 48 year old woman who presents for preventive health visit.     Physical   Annual:     Getting at least 3 servings of Calcium per day:  Yes    Bi-annual eye exam:  Yes    Dental care twice a year:  Yes    Sleep apnea or symptoms of sleep apnea:  None    Diet:  Regular (no restrictions)    Frequency of exercise:  2-3 days/week    Duration of exercise:  15-30 minutes    Taking medications regularly:  Yes    Additional concerns today:  No    PHQ-2 Total Score: 0    CONCERNS: Heavy period/ missed periods, anal cream    Didn't have a period for 6 months, last month had a period it was really heavy. Almost called because it was so heavy. No period this month.     The 10-year ASCVD risk score (Ziggyrajiv ALVARES Jr., et al., 2013) is: 0.6%    Values used to calculate the score:      Age: 48 years      Sex: Female      Is Non- : No      Diabetic: No      Tobacco smoker: No      Systolic Blood Pressure: 102 mmHg      Is BP treated: No      HDL Cholesterol: 61 mg/dL      Total Cholesterol: 188 mg/dL    # history of hemorrhoids and would like a refill on the steroid cream    # chronic hep b  - follows with [facility]  - next US in June 2019.    Today's PHQ-2 Score:   PHQ-2 ( 1999 [employer]) 2/22/2019   Q1: Little interest or pleasure in doing things 0   Q2: Feeling down, depressed or hopeless 0   PHQ-2 Score 0   Q1: Little interest or pleasure in doing things Not at all   Q2: Feeling down, depressed or hopeless Not at all   PHQ-2 Score 0     Abuse: Current or Past(Physical, Sexual or Emotional)- No  Do you feel safe in your environment? Yes    Social History     Tobacco Use     Smoking status: Never Smoker     Smokeless tobacco: Never Used   Substance Use Topics     Alcohol use: Yes     Comment: occasional      Alcohol Use 2/22/2019   If you drink alcohol do you typically have greater than 3 drinks per day OR greater than 7 drinks per week? No   No flowsheet data found.    Reviewed  orders with patient.  Reviewed health maintenance and updated orders accordingly - Yes  Patient Active Problem List   Diagnosis     Chronic hepatitis B (H)     Gastritis     History of iron deficiency     History of appendicitis with peritoneal abscess     External hemorrhoids     Female stress incontinence     Past Surgical History:   Procedure Laterality Date     APPENDECTOMY  2016     CHOLECYSTECTOMY       HC HYSTEROS W PERMANENT FALLOPAIN IMPLANT       LAPAROSCOPIC APPENDECTOMY N/A 4/3/2018    Procedure: LAPAROSCOPIC APPENDECTOMY;  LAPAROSCOPIC APPENDECTOMY   ;  Surgeon: Clarence Wayne MD;  Location: [facility] OR     LAPAROTOMY EXPLORATORY  2017    Procedure: LAPAROSCOPY CONVERTED TO EXPLORATORY LAPAROTOMY WITH EVACUATION OF RETROCOLONIC ABSCESS X 2; Surgeon: Johnnie Dinero DO; Location: [facility] OR; Service: General     MESH (IMPLANTABLE)  2017    Film Anti Adhesion Sepra 4301-02 - Sn.A. Abdomen       Social History     Tobacco Use     Smoking status: Never Smoker     Smokeless tobacco: Never Used   Substance Use Topics     Alcohol use: Yes     Comment: occasional      Family History   Problem Relation Age of Onset     Gout Mother      Hypertension Mother         92 in 2018     Other - See Comments Father          2 years ago, in [place], fever?     Other - See Comments Brother         Liver transplant (? hep b)         Current Outpatient Medications   Medication Sig Dispense Refill     hydrocortisone (ANUSOL-HC) 2.5 % cream Place rectally 2 times daily as needed for hemorrhoids 30 g 1     IBUPROFEN PO Take 400 mg by mouth every 6 hours as needed for moderate pain       loratadine (CLARITIN) 10 MG tablet Take 10 mg by mouth daily as needed for allergies       OMEPRAZOLE PO Take 20 mg by mouth daily as needed       Allergies   Allergen Reactions     Strawberry Swelling     Tomato Swelling     Azithromycin Rash     Annotation: on face         Mammogram Screening: Patient over age  50, mutual decision to screen reflected in health maintenance.    Pertinent mammograms are reviewed under the imaging tab.  History of abnormal Pap smear: NO - age 30-65 PAP every 5 years with negative HPV co-testing recommended     Reviewed and updated as needed this visit by clinical staff  Tobacco  Allergies  Meds         Reviewed and updated as needed this visit by Provider        Past Medical History:   Diagnosis Date     Chronic hepatitis B (H)     Likely vertical transmission, follows with [facility]. Low viral low, no tx. Monitoring for HCC to start at age 48.     Gastritis 2011    endoscopy 1/7/11 showing gastritis     Iron deficiency anemia 2015    Patient has iron deficiency anemia. Has seen hematology. Anemia is most likely due to heavy menstrual bleeding. Hematology recommended oral iron 3 times daily for 8 weeks. If she is responding, then she can continue on the oral iron therapy. The hematologist recommended 3 months of replacement therapy. She finished her iron supplement, which she was taking twice daily, and is not taking any supple     Perforated appendicitis      Post-operative wound abscess       Past Surgical History:   Procedure Laterality Date     APPENDECTOMY  12/26/2016     CHOLECYSTECTOMY       HC HYSTEROS W PERMANENT FALLOPAIN IMPLANT       LAPAROSCOPIC APPENDECTOMY N/A 4/3/2018    Procedure: LAPAROSCOPIC APPENDECTOMY;  LAPAROSCOPIC APPENDECTOMY   ;  Surgeon: Clarence Wayne MD;  Location:  OR     LAPAROTOMY EXPLORATORY  01/22/2017    Procedure: LAPAROSCOPY CONVERTED TO EXPLORATORY LAPAROTOMY WITH EVACUATION OF RETROCOLONIC ABSCESS X 2; Surgeon: Johnnie Dinero DO; Location: [facility]; Service: General     MESH (IMPLANTABLE)  01/22/2017    Film Anti Adhesion Sepra 4301-02 - Sn.A. Abdomen     Review of Systems   Constitutional: Negative for chills and fever.   HENT: Negative for congestion, ear pain, hearing loss and sore throat.    Eyes: Negative for pain and visual  "disturbance.   Respiratory: Negative for cough and shortness of breath.    Cardiovascular: Negative for chest pain, palpitations and peripheral edema.   Gastrointestinal: Negative for abdominal pain, constipation, diarrhea, heartburn, hematochezia and nausea.   Breasts:  Negative for tenderness, breast mass and discharge.   Genitourinary: Negative for dysuria, frequency, genital sores, hematuria, pelvic pain, urgency, vaginal bleeding and vaginal discharge.   Musculoskeletal: Negative for arthralgias, joint swelling and myalgias.   Skin: Negative for rash.   Neurological: Positive for dizziness. Negative for weakness, headaches and paresthesias.   Psychiatric/Behavioral: Negative for mood changes. The patient is not nervous/anxious.      OBJECTIVE:   /70   Pulse 58   Temp 98.2  F (36.8  C) (Oral)   Ht 1.575 m (5' 2\")   Wt 73.5 kg (162 lb)   SpO2 98%   Breastfeeding? No   BMI 29.63 kg/m    Physical Exam  GENERAL: healthy, alert and no distress  EYES: Eyes grossly normal to inspection, PERRL and conjunctivae and sclerae normal  HENT: ear canals and TM's normal, nose and mouth without ulcers or lesions  NECK: no adenopathy, no asymmetry, masses, or scars and thyroid normal to palpation  RESP: lungs clear to auscultation - no rales, rhonchi or wheezes  CV: regular rate and rhythm, normal S1 S2, no S3 or S4, no murmur, click or rub, no peripheral edema and peripheral pulses strong  ABDOMEN: soft, nontender, no hepatosplenomegaly, no masses and bowel sounds normal   (female): normal female external genitalia, normal urethral meatus, vaginal mucosa pink, moist, well rugated, and normal cervix/adnexa/uterus without masses or discharge  MS: no gross musculoskeletal defects noted, no edema  SKIN: no suspicious lesions or rashes  NEURO: Normal strength and tone, mentation intact and speech normal  PSYCH: mentation appears normal, affect normal/bright    Diagnostic Test Results:  See lab " "results.    ASSESSMENT/PLAN:   1. Routine general medical examination at a health care facility  Doing well. Will watch the 2 episodes of dizziness.     2. Chronic hepatitis B (H)  Follows with [facility] - next US in June 2019.     3. History of anemia  Suspect this was related to illness at the time, will recheck.   - CBC with platelets    4. External hemorrhoids  - hydrocortisone (ANUSOL-HC) 2.5 % cream; Place rectally 2 times daily as needed for hemorrhoids  Dispense: 30 g; Refill: 1    5. Female stress incontinence  - S/p 2 vaginal deliveries.   - somewhat bothersome  - discussed Kegel exercises and the option of Pelvic physical therapy.    6 Encounter for screening mammogram for breast cancer  - MA Screening Digital Bilateral; Future    7. Screening for diabetes mellitus  - Glucose    8. Screening for cervical cancer  - Pap imaged thin layer screen with HPV - recommended age 30 - 65 years (select HPV order below)  - HPV High Risk Types DNA Cervical    COUNSELING:  Reviewed preventive health counseling, as reflected in patient instructions       Regular exercise       Healthy diet/nutrition       Immunizations    Discussed shingles vaccine.       (Summer)menopause management    BP Readings from Last 1 Encounters:   02/22/19 102/70     Estimated body mass index is 29.63 kg/m  as calculated from the following:    Height as of this encounter: 1.575 m (5' 2\").    Weight as of this encounter: 73.5 kg (162 lb).    Weight management plan: Discussed healthy diet and exercise guidelines     reports that  has never smoked. she has never used smokeless tobacco.    Counseling Resources:  ATP IV Guidelines  Pooled Cohorts Equation Calculator  Breast Cancer Risk Calculator  FRAX Risk Assessment  ICSI Preventive Guidelines  Dietary Guidelines for Americans, 2010  USDA's MyPlate  ASA Prophylaxis  Lung CA Screening    Nayan Holloway MD  [facility] [place]  "

## 2019-02-26 LAB
COPATH REPORT: NORMAL
PAP: NORMAL

## 2019-02-27 LAB
FINAL DIAGNOSIS: NORMAL
HPV HR 12 DNA CVX QL NAA+PROBE: NEGATIVE
HPV16 DNA SPEC QL NAA+PROBE: NEGATIVE
HPV18 DNA SPEC QL NAA+PROBE: NEGATIVE
SPECIMEN DESCRIPTION: NORMAL
SPECIMEN SOURCE CVX/VAG CYTO: NORMAL

## 2019-04-05 ENCOUNTER — TELEPHONE (OUTPATIENT)
Dept: PEDIATRICS | Facility: CLINIC | Age: 49
End: 2019-04-05

## 2019-04-05 ENCOUNTER — HOSPITAL ENCOUNTER (OUTPATIENT)
Dept: ULTRASOUND IMAGING | Facility: CLINIC | Age: 49
Discharge: HOME | End: 2019-04-05
Attending: INTERNAL MEDICINE | Admitting: INTERNAL MEDICINE
Payer: COMMERCIAL

## 2019-04-05 DIAGNOSIS — N92.0 MENORRHAGIA: ICD-10-CM

## 2019-04-05 DIAGNOSIS — N92.0 MENORRHAGIA: Primary | ICD-10-CM

## 2019-04-05 DIAGNOSIS — D25.1 INTRAMURAL LEIOMYOMA OF UTERUS: ICD-10-CM

## 2019-04-05 PROCEDURE — 76830 TRANSVAGINAL US NON-OB: CPT

## 2019-04-05 NOTE — TELEPHONE ENCOUNTER
Patient calling to report heavy period starting today. She was to call if she had a heavy period again.   No worrisome symptoms reported.     Huddled with Dr. Jewel Mistry for US.     Ordered per verbal order.     Called patient, gave radiology scheduling number to call to set up the US appointment.     Reviewed worrisome signs and symptoms of vaginal bleeding to watch and be seen for.     Patient agrees with plan.

## 2019-04-30 ENCOUNTER — TRANSFERRED RECORDS (OUTPATIENT)
Dept: HEALTH INFORMATION MANAGEMENT | Facility: CLINIC | Age: 49
End: 2019-04-30

## 2019-04-30 ENCOUNTER — ANCILLARY PROCEDURE (OUTPATIENT)
Dept: MAMMOGRAPHY | Facility: CLINIC | Age: 49
End: 2019-04-30
Attending: INTERNAL MEDICINE
Payer: COMMERCIAL

## 2019-04-30 DIAGNOSIS — Z12.31 ENCOUNTER FOR SCREENING MAMMOGRAM FOR BREAST CANCER: ICD-10-CM

## 2019-04-30 PROCEDURE — 77067 SCR MAMMO BI INCL CAD: CPT | Mod: TC

## 2019-05-01 ENCOUNTER — TRANSFERRED RECORDS (OUTPATIENT)
Dept: HEALTH INFORMATION MANAGEMENT | Facility: CLINIC | Age: 49
End: 2019-05-01

## 2019-10-24 ENCOUNTER — HOSPITAL ENCOUNTER (EMERGENCY)
Facility: CLINIC | Age: 49
Discharge: HOME | End: 2019-10-24
Attending: EMERGENCY MEDICINE | Admitting: EMERGENCY MEDICINE
Payer: COMMERCIAL

## 2019-10-24 ENCOUNTER — APPOINTMENT (OUTPATIENT)
Dept: CT IMAGING | Facility: CLINIC | Age: 49
End: 2019-10-24
Attending: EMERGENCY MEDICINE
Payer: COMMERCIAL

## 2019-10-24 VITALS
HEART RATE: 54 BPM | BODY MASS INDEX: 27.6 KG/M2 | TEMPERATURE: 97.8 F | SYSTOLIC BLOOD PRESSURE: 111 MMHG | RESPIRATION RATE: 16 BRPM | WEIGHT: 150 LBS | OXYGEN SATURATION: 99 % | DIASTOLIC BLOOD PRESSURE: 95 MMHG | HEIGHT: 62 IN

## 2019-10-24 DIAGNOSIS — R10.9 FLANK PAIN: ICD-10-CM

## 2019-10-24 LAB
ALBUMIN SERPL-MCNC: 4.2 G/DL (ref 3.4–5)
ALBUMIN UR-MCNC: NEGATIVE MG/DL
ALP SERPL-CCNC: 77 U/L (ref 40–150)
ALT SERPL W P-5'-P-CCNC: 27 U/L (ref 0–50)
ANION GAP SERPL CALCULATED.3IONS-SCNC: 5 MMOL/L (ref 3–14)
APPEARANCE UR: CLEAR
AST SERPL W P-5'-P-CCNC: 24 U/L (ref 0–45)
BASOPHILS # BLD AUTO: 0 10E9/L (ref 0–0.2)
BASOPHILS NFR BLD AUTO: 0.5 %
BILIRUB SERPL-MCNC: 0.8 MG/DL (ref 0.2–1.3)
BILIRUB UR QL STRIP: NEGATIVE
BUN SERPL-MCNC: 10 MG/DL (ref 7–30)
CALCIUM SERPL-MCNC: 9.6 MG/DL (ref 8.5–10.1)
CHLORIDE SERPL-SCNC: 105 MMOL/L (ref 94–109)
CO2 SERPL-SCNC: 29 MMOL/L (ref 20–32)
COLOR UR AUTO: ABNORMAL
CREAT SERPL-MCNC: 0.63 MG/DL (ref 0.52–1.04)
D DIMER PPP FEU-MCNC: 0.7 UG/ML FEU (ref 0–0.5)
DIFFERENTIAL METHOD BLD: ABNORMAL
EOSINOPHIL # BLD AUTO: 0.2 10E9/L (ref 0–0.7)
EOSINOPHIL NFR BLD AUTO: 2.4 %
ERYTHROCYTE [DISTWIDTH] IN BLOOD BY AUTOMATED COUNT: 13.1 % (ref 10–15)
GFR SERPL CREATININE-BSD FRML MDRD: >90 ML/MIN/{1.73_M2}
GLUCOSE SERPL-MCNC: 98 MG/DL (ref 70–99)
GLUCOSE UR STRIP-MCNC: NEGATIVE MG/DL
HCG SERPL QL: NEGATIVE
HCT VFR BLD AUTO: 47.5 % (ref 35–47)
HGB BLD-MCNC: 15.6 G/DL (ref 11.7–15.7)
HGB UR QL STRIP: NEGATIVE
IMM GRANULOCYTES # BLD: 0 10E9/L (ref 0–0.4)
IMM GRANULOCYTES NFR BLD: 0.3 %
KETONES UR STRIP-MCNC: NEGATIVE MG/DL
LEUKOCYTE ESTERASE UR QL STRIP: NEGATIVE
LIPASE SERPL-CCNC: 216 U/L (ref 73–393)
LYMPHOCYTES # BLD AUTO: 2.9 10E9/L (ref 0.8–5.3)
LYMPHOCYTES NFR BLD AUTO: 35.9 %
MCH RBC QN AUTO: 29.2 PG (ref 26.5–33)
MCHC RBC AUTO-ENTMCNC: 32.8 G/DL (ref 31.5–36.5)
MCV RBC AUTO: 89 FL (ref 78–100)
MONOCYTES # BLD AUTO: 0.5 10E9/L (ref 0–1.3)
MONOCYTES NFR BLD AUTO: 6.4 %
MUCOUS THREADS #/AREA URNS LPF: PRESENT /LPF
NEUTROPHILS # BLD AUTO: 4.3 10E9/L (ref 1.6–8.3)
NEUTROPHILS NFR BLD AUTO: 54.5 %
NITRATE UR QL: NEGATIVE
NRBC # BLD AUTO: 0 10*3/UL
NRBC BLD AUTO-RTO: 0 /100
PH UR STRIP: 6.5 PH (ref 5–7)
PLATELET # BLD AUTO: 199 10E9/L (ref 150–450)
POTASSIUM SERPL-SCNC: 3.5 MMOL/L (ref 3.4–5.3)
PROT SERPL-MCNC: 8.2 G/DL (ref 6.8–8.8)
RBC # BLD AUTO: 5.35 10E12/L (ref 3.8–5.2)
RBC #/AREA URNS AUTO: <1 /HPF (ref 0–2)
SODIUM SERPL-SCNC: 139 MMOL/L (ref 133–144)
SOURCE: ABNORMAL
SP GR UR STRIP: 1 (ref 1–1.03)
SQUAMOUS #/AREA URNS AUTO: 1 /HPF (ref 0–1)
UROBILINOGEN UR STRIP-MCNC: NORMAL MG/DL (ref 0–2)
WBC # BLD AUTO: 7.9 10E9/L (ref 4–11)
WBC #/AREA URNS AUTO: 1 /HPF (ref 0–5)

## 2019-10-24 PROCEDURE — 74177 CT ABD & PELVIS W/CONTRAST: CPT

## 2019-10-24 PROCEDURE — 99285 EMERGENCY DEPT VISIT HI MDM: CPT | Mod: 25

## 2019-10-24 PROCEDURE — 85025 COMPLETE CBC W/AUTO DIFF WBC: CPT | Performed by: EMERGENCY MEDICINE

## 2019-10-24 PROCEDURE — 96374 THER/PROPH/DIAG INJ IV PUSH: CPT | Mod: 59

## 2019-10-24 PROCEDURE — 84703 CHORIONIC GONADOTROPIN ASSAY: CPT | Performed by: EMERGENCY MEDICINE

## 2019-10-24 PROCEDURE — 85379 FIBRIN DEGRADATION QUANT: CPT | Performed by: EMERGENCY MEDICINE

## 2019-10-24 PROCEDURE — 80053 COMPREHEN METABOLIC PANEL: CPT | Performed by: EMERGENCY MEDICINE

## 2019-10-24 PROCEDURE — 71275 CT ANGIOGRAPHY CHEST: CPT

## 2019-10-24 PROCEDURE — 81001 URINALYSIS AUTO W/SCOPE: CPT | Performed by: EMERGENCY MEDICINE

## 2019-10-24 PROCEDURE — 25000125 ZZHC RX 250: Performed by: EMERGENCY MEDICINE

## 2019-10-24 PROCEDURE — 96375 TX/PRO/DX INJ NEW DRUG ADDON: CPT

## 2019-10-24 PROCEDURE — 83690 ASSAY OF LIPASE: CPT | Performed by: EMERGENCY MEDICINE

## 2019-10-24 PROCEDURE — 96361 HYDRATE IV INFUSION ADD-ON: CPT

## 2019-10-24 PROCEDURE — 25000128 H RX IP 250 OP 636: Performed by: EMERGENCY MEDICINE

## 2019-10-24 PROCEDURE — 93005 ELECTROCARDIOGRAM TRACING: CPT

## 2019-10-24 RX ORDER — ONDANSETRON 2 MG/ML
4 INJECTION INTRAMUSCULAR; INTRAVENOUS ONCE
Status: COMPLETED | OUTPATIENT
Start: 2019-10-24 | End: 2019-10-24

## 2019-10-24 RX ORDER — IOPAMIDOL 755 MG/ML
500 INJECTION, SOLUTION INTRAVASCULAR ONCE
Status: COMPLETED | OUTPATIENT
Start: 2019-10-24 | End: 2019-10-24

## 2019-10-24 RX ORDER — ACETAMINOPHEN 500 MG
500-1000 TABLET ORAL EVERY 6 HOURS PRN
Qty: 20 TABLET | Refills: 0 | Status: SHIPPED | OUTPATIENT
Start: 2019-10-24 | End: 2019-10-31

## 2019-10-24 RX ORDER — HYDROMORPHONE HYDROCHLORIDE 1 MG/ML
0.5 INJECTION, SOLUTION INTRAMUSCULAR; INTRAVENOUS; SUBCUTANEOUS ONCE
Status: COMPLETED | OUTPATIENT
Start: 2019-10-24 | End: 2019-10-24

## 2019-10-24 RX ORDER — KETOROLAC TROMETHAMINE 15 MG/ML
15 INJECTION, SOLUTION INTRAMUSCULAR; INTRAVENOUS ONCE
Status: COMPLETED | OUTPATIENT
Start: 2019-10-24 | End: 2019-10-24

## 2019-10-24 RX ADMIN — KETOROLAC TROMETHAMINE 15 MG: 15 INJECTION, SOLUTION INTRAMUSCULAR; INTRAVENOUS at 17:21

## 2019-10-24 RX ADMIN — HYDROMORPHONE HYDROCHLORIDE 0.5 MG: 1 INJECTION, SOLUTION INTRAMUSCULAR; INTRAVENOUS; SUBCUTANEOUS at 17:24

## 2019-10-24 RX ADMIN — IOPAMIDOL 55 ML: 755 INJECTION, SOLUTION INTRAVENOUS at 18:05

## 2019-10-24 RX ADMIN — SODIUM CHLORIDE 76 ML: 9 INJECTION, SOLUTION INTRAVENOUS at 18:05

## 2019-10-24 RX ADMIN — SODIUM CHLORIDE 1000 ML: 9 INJECTION, SOLUTION INTRAVENOUS at 17:20

## 2019-10-24 RX ADMIN — ONDANSETRON HYDROCHLORIDE 4 MG: 2 INJECTION, SOLUTION INTRAMUSCULAR; INTRAVENOUS at 17:21

## 2019-10-24 ASSESSMENT — ENCOUNTER SYMPTOMS
FEVER: 0
CONSTIPATION: 0
ABDOMINAL PAIN: 0
SHORTNESS OF BREATH: 0
NAUSEA: 1
WEAKNESS: 0
COUGH: 0
VOMITING: 0
NUMBNESS: 0
DIARRHEA: 0
FLANK PAIN: 1

## 2019-10-24 ASSESSMENT — MIFFLIN-ST. JEOR: SCORE: 1248.65

## 2019-10-24 NOTE — ED TRIAGE NOTES
Reports bilateral flank and side pain worse on L. Pt state pain radiates down L hip. Denies urinary or stool changes, nausea or emesis. ABC in tact. A/OX4

## 2019-10-24 NOTE — ED AVS SNAPSHOT
[facility] Emergency Department  [address]  [facility] [address]  Phone:  [phone]  Fax:  [phone]                                    Mirta Sims   MRN: [id number]    Department:  [facility] Emergency Department   Date of Visit:  10/24/2019           After Visit Summary Signature Page    I have received my discharge instructions, and my questions have been answered. I have discussed any challenges I see with this plan with the nurse or doctor.    ..........................................................................................................................................  Patient/Patient Representative Signature      ..........................................................................................................................................  Patient Representative Print Name and Relationship to Patient    ..................................................               ................................................  Date                                   Time    ..........................................................................................................................................  Reviewed by Signature/Title    ...................................................              ..............................................  Date                                               Time          22EPIC Rev 08/18

## 2019-10-24 NOTE — ED PROVIDER NOTES
History     Chief Complaint:  Flank Pain    The history is provided by the patient.      Mirta Sims is a 49 year old female, s/p appendectomy and cholecystectomy, who presents with intermittent bilateral flank pain, left greater than right. The pain started a few weeks ago at the beginning of the month. The pain has been manageable at home with Tylenol and ibuprofen. However last night at 0100, patient's pain worsened significantly.  It radiates slightly to upper abdomen. The pain continued into this afternoon intermittently and patient presents to the ED. Here, patient is nauseated. No fever, shortness of breath, cough, chest pain, vomiting, abdominal pain, diarrhea, constipation, weakness of legs or numbness of legs. No history of trauma to back or kidney stones. Patient was in [place] about two weeks ago but her pain was present before this trip.    PE/DVT Risk Factors   Personal History: Negative  Recent Travel: Positive   Recent Surgery/Hospitalization: Negative  Tobacco: Negative  Family History: Negative  Hormone Use: Negative   Cancer: Negative  Trauma: Negative      Allergies:  Strawberry  Tomato  Azithromycin     Medications:    Claritin  Omeprazole     Past Medical History:  Iron deficiency  Chronic hepatitis B  Gastritis    Female stress incontinence  Perforated appendicitis  Post-op wound abscess   Gastritis  Carpal tunnel syndrome  Anemia    Past Surgical History:    Appendectomy  Cholecystectomy  Laparotomy  Mesh surgery     Family History:    Gout  HTN - mother   Heart disease - brother     Social History:  Negative for tobacco use.  Positive for alcohol use.   Negative for drug use.  Marital Status:  .     Review of Systems   Constitutional: Negative for fever.   Respiratory: Negative for cough and shortness of breath.    Cardiovascular: Negative for chest pain.   Gastrointestinal: Positive for nausea. Negative for abdominal pain, constipation, diarrhea and vomiting.   Genitourinary:  "Positive for flank pain (bilateral, L > R).   Neurological: Negative for weakness and numbness.   All other systems reviewed and are negative.    Physical Exam     Patient Vitals for the past 24 hrs:   BP Temp Temp src Pulse Heart Rate Resp SpO2 Height Weight   10/24/19 1839 -- -- -- -- -- -- 99 % -- --   10/24/19 1830 (!) 111/95 -- -- 54 -- -- 92 % -- --   10/24/19 1827 -- -- -- -- -- -- 99 % -- --   10/24/19 1820 124/73 -- -- 53 -- -- -- -- --   10/24/19 1800 -- -- -- -- -- -- 99 % -- --   10/24/19 1645 (!) 125/94 97.8  F (36.6  C) Oral -- 54 16 98 % 1.575 m (5' 2\") 68 kg (150 lb)     Physical Exam  Nursing note and vitals reviewed.  Constitutional: Well nourished.   Eyes: Conjunctiva normal.  Pupils are equal, round, and reactive to light.   ENT: Nose normal. Mucous membranes pink and moist.    Neck: Normal range of motion.  CVS: Normal rate, regular rhythm.  Normal heart sounds.  No murmur.  Pulmonary: Lungs clear to auscultation bilaterally. No wheezes/rales/rhonchi.  GI: Abdomen soft. Nontender, nondistended. No rigidity or guarding.  L. CVA tenderness  MSK: No calf tenderness or swelling. No c/t/l bony tenderness. No reproducible hip tenderness.   Neuro: Alert. Follows simple commands. Sensation intact x 4.   Skin: Skin is warm and dry. No rash noted.   Psychiatric: Normal affect.     Emergency Department Course   ECG:  Indication: Flank pain   Time: 1723  Vent. Rate 48 bpm. NE interval 156. QRS duration 76. QT/QTc 462/412. P-R-T axis 29 53 38. Sinus bradycardia. Otherwise normal ECG. Agrees with computer interpretation. Read time: 1725.    Imaging:  Radiographic findings were communicated with the patient who voiced understanding of the findings.    CT Chest (PE) Abdomen Pelvis w Contrast   Final Result   IMPRESSION:   1.  No pulmonary embolism.      2.  Mild mosaic perfusion pattern seen in the lung bases can be seen with small airway disorders and air trapping.      3.  Previous cholecystectomy and " appendectomy.      4.  Fallopian tube implants.     As per radiology.      Laboratory:  CBC: WBC: 7.9, HGB: 15.6, PLT: 199  CMP: WNL (Creatinine: 0.63)  Lipase: 216  HCG qualitative: negative  D-dimer: 0.7 (H)    UA with micro: mucous present o/w negative    Interventions:  1720 NS 1L IV  1721 Toradol 15 mg IV   1721 Zofran 4 mg IV   1724 Dilaudid 0.5 mg IV     Emergency Department Course:  1651 Nursing notes and vitals reviewed. I performed an exam of the patient as documented above.     IV inserted. Medicine administered as documented above. Blood drawn. This was sent to the lab for further testing, results above. The patient provided a urine sample here in the emergency department. This was sent for laboratory testing, findings above.     The patient was sent for a chest CT while in the emergency department, findings above.     1844 I rechecked the patient and discussed the results of her workup thus far.     Findings and plan explained to the Patient. Patient discharged home with instructions regarding supportive care, medications, and reasons to return. The importance of close follow-up was reviewed. The patient was prescribed Tylenol.    I personally reviewed the laboratory results with the Patient and answered all related questions prior to discharge.     Impression & Plan    Medical Decision Making:  Patient is a 49-year-old female presenting with flank pain.  Triage note reported radiation to hip though patient denies.  She is nontoxic, well-hydrated on arrival.  She has a non-peritoneal abdomen.  Patient reports recent prolonged travel.  I discussed concern for possible PE as she does not strike me as a typical kidney stone so decision was made to screen with d-dimer.  This was elevated so patient did undergo formal CT which was fortunately negative for PE.  There was no evidence of renal calculi or other intra-abdominal process.  Lungs read as moderate air trapping on CT though clinically patient has no  wheezing and I doubt reactive airway disease.  She reported symptom improvement during her time in the ED.  Her lab work is reassuring without infection or significant electrolyte abnormality.  UA without gross infection.  She had no reproducible bony back tenderness.  I discussed with patient unknown etiology to explain her pain though may be musculoskeletal.  I recommended Tylenol/NSAIDs with plans for close PCP follow-up.  Return precautions given  Diagnosis:    ICD-10-CM    1. Flank pain R10.9      Disposition:  discharged to home    Discharge Medications:  New Prescriptions    ACETAMINOPHEN (TYLENOL) 500 MG TABLET    Take 1-2 tablets (500-1,000 mg) by mouth every 6 hours as needed for mild pain     Scribe Disposition  I, Amy Plata, am serving as a scribe on 10/24/2019 at 4:57 PM to personally document services performed by Annabelle Mccoy DO based on my observations and the provider's statements to me.     Amy Plata  10/24/2019   [facility] EMERGENCY DEPARTMENT       Annabelle Mccoy DO  10/24/19 2000

## 2019-10-24 NOTE — LETTER
October 24, 2019      To Whom It May Concern:      Mirta Sims was seen in our Emergency Department today, 10/24/19.  I expect her condition to improve over the next 2 days.  She may return to work/school when improved.    Sincerely,        Trinity Jj RN BSN LESLIE

## 2019-10-25 LAB — INTERPRETATION ECG - MUSE: NORMAL

## 2019-11-25 ENCOUNTER — OFFICE VISIT (OUTPATIENT)
Dept: PODIATRY | Facility: CLINIC | Age: 49
End: 2019-11-25
Payer: COMMERCIAL

## 2019-11-25 VITALS
SYSTOLIC BLOOD PRESSURE: 120 MMHG | WEIGHT: 150 LBS | HEIGHT: 62 IN | BODY MASS INDEX: 27.6 KG/M2 | DIASTOLIC BLOOD PRESSURE: 70 MMHG

## 2019-11-25 DIAGNOSIS — G57.62 LESION OF PLANTAR NERVE, LEFT: Primary | ICD-10-CM

## 2019-11-25 PROCEDURE — 64455 NJX AA&/STRD PLTR COM DG NRV: CPT | Performed by: PODIATRIST

## 2019-11-25 PROCEDURE — 99213 OFFICE O/P EST LOW 20 MIN: CPT | Mod: 25 | Performed by: PODIATRIST

## 2019-11-25 ASSESSMENT — MIFFLIN-ST. JEOR: SCORE: 1248.65

## 2019-11-25 NOTE — PATIENT INSTRUCTIONS
Thank you for choosing [facility] / [facility]le Surgery!    DR. KAHN'S CLINIC LOCATIONS:   MONDAY - [place] TUESDAY - [place]   [address] [facility]  [address]   [place] [address] [place] [address]   [phone]       THURSDAY AM - [place] THURSDAY PM - [place]   [address]   [place] [address] [place], [place] [address] [phone]       FRIDAY AM - [place] SET UP SURGERY: [phone] [address] APPOINTMENTS: [phone]   [place] [address] BILLING QUESTIONS: [phone] FAX NUMBER: [phone]     Follow Up: as needed    PRICE THERAPY  Many aches and pains throughout the foot and ankle can be helped with many simple treatments. This is usually described as PRICE Therapy.      P - Protection - often times, inflammation/pain in the lower extremity is not able to improve simply because the areas involved are never allowed to rest. Every step we take can bother the problematic area. Protecting those areas is an important step in the healing process. This may involve a walking cast boot, a special insert/orthotic device, an ankle brace, or simply avoiding barefoot walking.    R - Rest - in addition to protecting the foot/ankle, resting is an important, but often times difficult, treatment option. Getting off your feet when they bother you, and specifically avoiding activities that cause pain/discomfort, are very beneficial to prevent, and treat, foot/ankle pain.      I - Ice - icing regularly can help to decrease inflammation and swelling in the foot, thus decreasing pain. Using an ice pack or a bag of frozen veggies works very well. Ice for 20 minutes multiple times per day as needed.  Do not place the ice directly on the skin as this can cause tissue damage.    C - Compression - using a compression wrap or an ACE wrap can help to decrease swelling, which can help to decrease pain. Wearing  the wraps is generally not needed at night, but they should be worn on a regular basis when you are going to be on your feet for prolonged periods as gravity tends to pull fluids down to your feet/ankles.    E - Elevation - elevating your lower extremities multiple times daily for 15-20 minutes can help to decrease swelling, which works well in decreasing pain levels.    NSAID/Tylenol - Anti-inflammatories like Aleve or ibuprofen, and/or a pain medication, such as Tylenol, can help to improve pain levels and get the issue resolved sooner rather than later. Anyone with liver issues should be careful with Tylenol, and anyone with high blood pressure or heart, stomach or kidney issues should be careful with anti-inflammatories. Please ask if you have questions about these medications, including dosage.    OVER THE COUNTER INSERTS    Most of these can be found at your local [employer], [employer], or online:    Aprilageeet   Sofsole Fit Visual IQ   Power Step   Walk-Fit  ([employer]) Arch Cradles       **A good high quality over the counter insert should cost around $40-$50            LYONS'S NEUROMA   Lyons's neuroma is an enlargement or thickening of a nerve in the foot. It is also sometimes referred to as an intermetatarsal neuroma, interdigital neuroma, Lyons's metatarsalgia (pain in the metatarsal head area), vickie-neural fibrosis (scar tissue around a nerve) or entrapment neuropathy (abnormal nerve due to compression). A Lyons's neuroma most commonly occurs in the third interspace between the third and fourth toes, followed by the second interspace between the second and third toes. Lyons's neuromas have also occurred in the fourth and first interspaces, but these are rare. If you have a Lyons's neuroma, there is a 15% chance it will occur bilaterally (on both feet). Lyons's neuromas occur most commonly in women who are between 30 to 50 years old. The reason they are more  "common in women is thought to be due to the shoes women wear.   CAUSES: A Lyons's neuroma is thought to be caused by trauma to the nerve, but scientists are still not sure about the exact cause of the trauma. The trauma may be caused by the metatarsal heads, the deep transverse intermetatarsal ligament (holds the metatarsal heads together) or an intermetatarsal bursa (fluid-filled sac). All of these structures can cause compression/trauma on the nerve which initially causes swelling and injury in the nerve. Over time if the compression/trauma continues, the nerve repairs itself with very fibrous tissue that leads to enlargement and thickening of the nerve. Other causes of trauma to the nerve may include; overpronation (foot rolls inward), hypermobility (too much motion), cavo varus (high arch foot) and excessive dorsiflexion (toes bend upward) of the toes. These biomechanical (howthe foot moves) factors may cause trauma to the nerve with every step. If the nerve becomes irritated and enlarged then it takes up more space and gets even more compressed and irritated. It becomes a vicious cycle.   SIGNS & SYMPTOMS  - Pain (sharp, stabbing, throbbing, shooting)   - Numbness   - Tingling or \"pins & needles\"   - Burning   - Cramping   - Feeling that you are stepping on something or that something is in your shoe   - Initially the symptoms may happen once in a while, but as the condition gets     worse, the symptoms may happen all of the time   - It usually feels better by taking off your shoe and massaging your foot     DIAGNOSIS: Your podiatrist will ask many questions about your signs and symptoms and will perform a physical exam. Some of the exams may include a web space compression test. This is done by squeezing the metatarsals together with one hand and using the thumb and index finger of the other hand to compress the affected web space to reproduce the pain/symptoms. A palpable click (Sandra's click) is usually " present. This test may also cause pain to shoot into the toes and that is called a Tinel's sign. Bambi's test involves squeezing the metatarsals together and moving the toes up and down for 30 seconds. This will usually cause pain or it will bring on your other symptoms. Duran's sign is positive when you stand and the affected toes spread apart. A Lyons's neuroma is usually diagnosed based on the history and physical exam findings, but sometimes other tests such as an x-ray, ultrasound or an MRI are needed.   TREATMENT  1.  Footwear Changes: Wear shoes that are wide and deep in the toe box so they  do not put pressure on your toes and metatarsals. Avoid wearing high heels because they cause increased pressure on the ball of your foot (forefoot).    2.  Metatarsal Pads: These help to lift and separate the metatarsal heads to take pressure off of the nerve. They are placed just behind where you feel the pain, not on top of the painful spot.   3.  Activity modification: For example, you may try swimming instead of running until your symptoms go away.   4.  Taping   5.  Icing   6.  NSAIDs (anti-inflammatories): aleve, ibuprofen, etc.   7.  Arch Supports or Orthotics: These help to control some of the abnormal motion in your feet. The abnormal motion can lead to extra torque and pressure on the nerve.   8.  Physical Therapy  9.  Cortisone Injection: Helps to decrease the size of the irritated, enlarged nerve.   10.  Sclerosing Alcohol Injection: Helps to destroy the nerve chemically, which causes permanent numbness    SURGERY  If conservative treatment does not help surgery may be needed. Surgery may involve cutting out the nerve or cutting the intermetatarsalligament. Studies have shown surgery has an 80-85% success rate.  Will result in numbness    PREVENTION  -Avoid wearing narrow, pointed toe shoes, or high heels        FYI: The following information is included in the after visit summary for all  patients:  Body weight can be a sensitive issue to discuss in clinic, but we think the following information is very important. Although we focus on the feet and ankles, we do support the overall health of our patients. Many things can cause foot and ankle problems. Foot structure, activity level, foot mechanics and injuries are common causes of pain. One very important issue that often goes unmentioned, is body weight. Extra weight can cause increased stress on muscles, ligaments, bones and tendons. Sometimes just a few extra pounds is all it takes to put one over her/his threshold. Without reducing that stress, it can be difficult to alleviate pain. As Foot & Ankle specialists, our job is addressing the lower extremity problem and possible causes. Regarding extra body weight, we encourage patients to discuss diet and weight management plans with their primary care doctors. It is this team approach that gives you the best opportunity for pain relief and getting you back on your feet. [facility] has a Comprehensive Weight Management Program. This program includes counseling, education, non-surgical and surgical approaches to weight loss. If you are interested in learning more either talk to you primary care provider or call [phone].

## 2019-11-25 NOTE — PROGRESS NOTES
"Foot & Ankle Surgery   November 25, 2019    S:  Pt is seen today for evaluation of left foot pain x a few months.  Worse with jogging.  Insidious onset without injury.  Saw Dr Herrera last December for bilateral heel pain.  Got new shoes, inserts, icing, NSAID/tylenol and heel pain resolved.  She has tried many of these therapies without relief of left forefoot pain.  Points between 3rd and 4th toes/metatarsals.      Vitals:    11/25/19 1400   BP: 120/70   Weight: 68 kg (150 lb)   Height: 1.575 m (5' 2\")   '      ROS - Pos for CC.  Patient denies current nausea, vomiting, chills, fevers, belly pain, calf pain, chest pain or SOB.  Complete remainder of ROS it otherwise neg.      PE:  Gen:   No apparent distress  Eye:    Visual scanning without deficit  Ear:    Response to auditory stimuli wnl  Lung:    Non-labored breathing on RA noted  Abd:    NTND per patient report  Lymph:    Neg for pitting/non-pitting edema BLE  Vasc:    Pulses palpable, CFT minimally delayed  Neuro:    Light touch sensation intact to all sensory nerve distributions without paresthesias  Derm:    Neg for nodules, lesions or ulcerations  MSK:    Left lower extremity - very tender 3rd interspace distally.  Slight 3rd MPJ/met discomfort.  Otherwise neg.  Calf:    Neg for redness, swelling or tenderness    Assessment:  49 year old female with dominguez's neuroma left foot      Plan:  Discussed etiologies, anatomy and options  1.  Dominguez's neuroma left foot  -neuroma handout for patient information  -RICE/NSAID vs tylenol prn based on pain  -OTC insert for arch support and nerve decompression  -diagnostic/therapeutic steroid injection, see procedure note  -consider repeat injection, US/MRI based on injection results    After obtaining written consent, the skin was prepped with alcohol.  The needle was advance to the underlying left 3rd common digital plantar nerve, aspiration was done with position change.  1 1/2cc mixture of 2:1 kenalog 40:0.25% marcaine " plain was injected.  The patient tolerated the procedure without complication.  Risks that were discussed included possible joint/soft tissue damage, neuritis/numbness, infection, pigment change, steroid flare.       Follow up:  Prn based on injection results or sooner with acute issues      Body mass index is 27.44 kg/m .  Weight management plan: Patient was referred to their PCP to discuss a diet and exercise plan.         Isaac Carlos DPM FACFAS FACFAOM  Podiatric Foot & Ankle Surgeon  [facility]  [phone]

## 2019-12-16 ENCOUNTER — OFFICE VISIT (OUTPATIENT)
Dept: PODIATRY | Facility: CLINIC | Age: 49
End: 2019-12-16
Payer: COMMERCIAL

## 2019-12-16 ENCOUNTER — ANCILLARY PROCEDURE (OUTPATIENT)
Dept: GENERAL RADIOLOGY | Facility: CLINIC | Age: 49
End: 2019-12-16
Attending: PODIATRIST
Payer: OTHER MISCELLANEOUS

## 2019-12-16 VITALS
HEIGHT: 62 IN | SYSTOLIC BLOOD PRESSURE: 116 MMHG | DIASTOLIC BLOOD PRESSURE: 74 MMHG | WEIGHT: 150 LBS | BODY MASS INDEX: 27.6 KG/M2

## 2019-12-16 DIAGNOSIS — M77.42 METATARSALGIA, LEFT FOOT: ICD-10-CM

## 2019-12-16 DIAGNOSIS — M77.42 METATARSALGIA, LEFT FOOT: Primary | ICD-10-CM

## 2019-12-16 PROCEDURE — 73630 X-RAY EXAM OF FOOT: CPT | Mod: LT

## 2019-12-16 PROCEDURE — 99214 OFFICE O/P EST MOD 30 MIN: CPT | Performed by: PODIATRIST

## 2019-12-16 RX ORDER — PREDNISONE 5 MG/1
TABLET ORAL
Qty: 21 TABLET | Refills: 0 | Status: SHIPPED | OUTPATIENT
Start: 2019-12-16 | End: 2019-12-16

## 2019-12-16 RX ORDER — PREDNISONE 5 MG/1
TABLET ORAL
Qty: 21 TABLET | Refills: 0 | Status: SHIPPED | OUTPATIENT
Start: 2019-12-16 | End: 2020-06-16

## 2019-12-16 ASSESSMENT — MIFFLIN-ST. JEOR: SCORE: 1248.65

## 2019-12-30 ENCOUNTER — ANCILLARY PROCEDURE (OUTPATIENT)
Dept: GENERAL RADIOLOGY | Facility: CLINIC | Age: 49
End: 2019-12-30
Attending: PODIATRIST
Payer: COMMERCIAL

## 2019-12-30 ENCOUNTER — OFFICE VISIT (OUTPATIENT)
Dept: PODIATRY | Facility: CLINIC | Age: 49
End: 2019-12-30
Payer: COMMERCIAL

## 2019-12-30 VITALS
DIASTOLIC BLOOD PRESSURE: 66 MMHG | BODY MASS INDEX: 27.6 KG/M2 | HEIGHT: 62 IN | SYSTOLIC BLOOD PRESSURE: 110 MMHG | WEIGHT: 150 LBS

## 2019-12-30 DIAGNOSIS — M77.42 METATARSALGIA, LEFT FOOT: ICD-10-CM

## 2019-12-30 DIAGNOSIS — M77.42 METATARSALGIA, LEFT FOOT: Primary | ICD-10-CM

## 2019-12-30 PROCEDURE — 99214 OFFICE O/P EST MOD 30 MIN: CPT | Performed by: PODIATRIST

## 2019-12-30 PROCEDURE — 73630 X-RAY EXAM OF FOOT: CPT | Mod: LT

## 2019-12-30 ASSESSMENT — MIFFLIN-ST. JEOR: SCORE: 1248.65

## 2019-12-30 NOTE — PATIENT INSTRUCTIONS
Thank you for choosing [facility] / [facility] Surgery!    DR. KAHN'S CLINIC LOCATIONS:   MONDAY - [place] TUESDAY AM - [place]   [address] [facility]  [address]   [place] [address] [place] [address]   [phone]       THURSDAY AM - [place] THURSDAY PM - [place]   [address] [facility] [address]   [place] [address] [place], [place] [address] [phone]       FRIDAY AM - [place] SET UP SURGERY: [phone] [address] APPOINTMENTS: [phone]   [place] [address] BILLING QUESTIONS: [phone] FAX NUMBER: [phone]     [facility] SCHEDULING  They should be calling you within 24 hours to schedule your scan.  If not, please call the location discussed at your appointment.    1) [facility]:       [phone]      [address] [place] [address]    2) [facility]:      [phone] [address] [address]    3) [facility]:       [phone]      [address] [place] [address]    * We will call you with the results. Please allow 24-48 hours for the results to be read and final.

## 2019-12-30 NOTE — PROGRESS NOTES
"Foot & Ankle Surgery   December 30, 2019    S:  Pt is seen today for evaluation of left foot/4th metatarsal pain.  Felt better with the Rx steroid dose.  States she's been wearingn the boot and resting, but also states she went back to the gym and has been having continued pain.    Vitals:    12/30/19 1456   BP: 110/66   Weight: 68 kg (150 lb)   Height: 1.575 m (5' 2\")   '      ROS - Pos for CC.  Patient denies current nausea, vomiting, chills, fevers, belly pain, calf pain, chest pain or SOB.  Complete remainder of ROS it otherwise neg.      PE:  Gen:   No apparent distress  Eye:    Visual scanning without deficit  Ear:    Response to auditory stimuli wnl  Lung:    Non-labored breathing on RA noted  Abd:    NTND per patient report  Lymph:    Neg for pitting/non-pitting edema BLE  Vasc:    Pulses palpable, CFT minimally delayed  Neuro:    Light touch sensation intact to all sensory nerve distributions without paresthesias  Derm:    Neg for nodules, lesions or ulcerations  MSK:    Continued pain left 4th metatarsal midshaft, where there is slight indurated tissue.  Minimal local inflammation noted  Calf:    Neg for redness, swelling or tenderness      Imaging:  3 views WB - no periosteal reaction, callus or fracture line noted at 4th metatarsal    Assessment:  49 year old female with continued left 4th metatarsal shaft pain without xray evidence of stress fracture      Plan:  Discussed etiologies, anatomy and options  1.  Continued left 4th metatarsal shaft pain without xray evidence of stress fracture  -personally reviewed today's xrays and those from 2 weeks ago. No evidence of stress fracture  -comfortable shoe gear vs boot. States the boot can start to bother her foot after a while  -RICE/NSAID vs tylenol prn  -MRI  At [facility], will call with results    Follow up:  prn or sooner with acute issues      Body mass index is 27.44 kg/m .  Weight management plan: Patient was referred to their PCP to discuss a diet and " exercise plan.         Isaac Carlos DPM FACFAS FACFAOM  Podiatric Foot & Ankle Surgeon  [facility]  [phone]

## 2020-06-16 ENCOUNTER — OFFICE VISIT (OUTPATIENT)
Dept: URGENT CARE | Facility: URGENT CARE | Age: 50
End: 2020-06-16
Payer: COMMERCIAL

## 2020-06-16 ENCOUNTER — ANCILLARY PROCEDURE (OUTPATIENT)
Dept: GENERAL RADIOLOGY | Facility: CLINIC | Age: 50
End: 2020-06-16
Attending: PHYSICIAN ASSISTANT
Payer: COMMERCIAL

## 2020-06-16 VITALS
TEMPERATURE: 97.6 F | WEIGHT: 161.7 LBS | OXYGEN SATURATION: 98 % | DIASTOLIC BLOOD PRESSURE: 62 MMHG | HEART RATE: 64 BPM | BODY MASS INDEX: 29.58 KG/M2 | SYSTOLIC BLOOD PRESSURE: 120 MMHG

## 2020-06-16 DIAGNOSIS — R09.A2 GLOBUS SENSATION: ICD-10-CM

## 2020-06-16 DIAGNOSIS — R09.A2 GLOBUS SENSATION: Primary | ICD-10-CM

## 2020-06-16 LAB
BASOPHILS # BLD AUTO: 0 10E9/L (ref 0–0.2)
BASOPHILS NFR BLD AUTO: 0.4 %
DIFFERENTIAL METHOD BLD: NORMAL
EOSINOPHIL # BLD AUTO: 0.4 10E9/L (ref 0–0.7)
EOSINOPHIL NFR BLD AUTO: 4.3 %
ERYTHROCYTE [DISTWIDTH] IN BLOOD BY AUTOMATED COUNT: 12.4 % (ref 10–15)
HCT VFR BLD AUTO: 45 % (ref 35–47)
HGB BLD-MCNC: 14.8 G/DL (ref 11.7–15.7)
LYMPHOCYTES # BLD AUTO: 2.5 10E9/L (ref 0.8–5.3)
LYMPHOCYTES NFR BLD AUTO: 29.9 %
MCH RBC QN AUTO: 29.7 PG (ref 26.5–33)
MCHC RBC AUTO-ENTMCNC: 32.9 G/DL (ref 31.5–36.5)
MCV RBC AUTO: 90 FL (ref 78–100)
MONOCYTES # BLD AUTO: 0.6 10E9/L (ref 0–1.3)
MONOCYTES NFR BLD AUTO: 6.7 %
NEUTROPHILS # BLD AUTO: 4.9 10E9/L (ref 1.6–8.3)
NEUTROPHILS NFR BLD AUTO: 58.7 %
PLATELET # BLD AUTO: 157 10E9/L (ref 150–450)
RBC # BLD AUTO: 4.98 10E12/L (ref 3.8–5.2)
WBC # BLD AUTO: 8.3 10E9/L (ref 4–11)

## 2020-06-16 PROCEDURE — 85025 COMPLETE CBC W/AUTO DIFF WBC: CPT | Performed by: PHYSICIAN ASSISTANT

## 2020-06-16 PROCEDURE — 36415 COLL VENOUS BLD VENIPUNCTURE: CPT | Performed by: PHYSICIAN ASSISTANT

## 2020-06-16 PROCEDURE — 71046 X-RAY EXAM CHEST 2 VIEWS: CPT

## 2020-06-16 PROCEDURE — 99214 OFFICE O/P EST MOD 30 MIN: CPT | Performed by: PHYSICIAN ASSISTANT

## 2020-06-16 RX ORDER — OMEPRAZOLE 20 MG/1
20 TABLET, DELAYED RELEASE ORAL DAILY
Qty: 30 TABLET | Refills: 0 | Status: SHIPPED | OUTPATIENT
Start: 2020-06-16 | End: 2020-07-16

## 2020-06-16 NOTE — PATIENT INSTRUCTIONS
Follow up if symptoms worsen or fail to improve      Patient Education     Gastritis (Adult)    Gastritis is inflammation and irritation of the stomach lining. You can have it for a short time (acute) or be long lasting (chronic). Infection with bacteria called H pylori most often causes gastritis. More than a third of people in the [place] have these bacteria in their bodies. In many cases, H pylori causes no problems or symptoms. In some people, though, the infection irritates the stomach lining and causes gastritis. H. pylori may be diagnosed through blood, stool, or breath tests, we well as through biopsy during an endoscopy. Other causes of stomach irritation include drinking alcohol, smoking or chewing tobacco, or taking pain-relieving medicines called NSAIDs (such as aspirin or ibuprofen). Certain drugs (such as cocaine) and immune conditions can also cause gastritis.  Symptoms of gastritis can include:    Belly pain or bloating    Feeling full quickly    Loss of appetite    Nausea or vomiting    Vomiting blood or having black stools    Feeling more tired than usual  An inflamed and irritated stomach lining is more likely to develop a sore called an ulcer. To help prevent this, gastritis should be treated.  Home care  If needed, our healthcare provider may prescribe medicines. If you have H pylori infection, treating it will likely relieve your symptoms. Other changes can help reduce stomach irritation and help it heal.    If you have been prescribed medicines for H pylori infection, take them as directed. Take all of the medicine until it is finished or your healthcare provider tells you to stop, even if you feel better.    Your healthcare provider may advise you not to take NSAIDs. If you take daily aspirin for your heart or other medical reasons, do not stop without talking to your healthcare provider first.    Don't drink alcohol.    Stop smoking. Smoking can irritate the stomach and delay healing. As much as  possible, stay away from second hand smoke.  Follow-up care  Follow up with your healthcare provider, or as advised by our staff. You may need testing to check for inflammation or an ulcer.  When to seek medical advice  Call your healthcare provider for any of the following:    Stomach pain that gets worse or moves to the lower right belly (appendix area)    Chest pain that appears or gets worse, or spreads to the back, neck, shoulder, or arm    Frequent vomiting (can t keep down liquids)    Blood in the stool or vomit (red or black in color)    Feeling weak or dizzy    Shortness of breath    Unexplained weight loss    Fever of 100.4 F (38 C) or higher, or as directed by your healthcare provider  Date Last Reviewed: 3/1/2018    [id number] [employer], [employer]. [address] [place] [address], [place] [address]. All rights reserved. This information is not intended as a substitute for professional medical care. Always follow your healthcare professional's instructions.

## 2020-06-17 NOTE — PROGRESS NOTES
SUBJECTIVE  HPI:  Mirta Sims is a 50 year old female who presents with the CC of epigastric discomfort.    Pain is located in the epigastric area, with radiation to None.  The pain is characterized as pressure, feels like a piece of ham might be stuck.  Eating and drinking without incidnet.  No cough, shortness of breath or pain.      Past Medical History:   Diagnosis Date     Chronic hepatitis B (H)     Likely vertical transmission, follows with [facility]. Low viral low, no tx. Monitoring for HCC to start at age 48.     Gastritis 2011    endoscopy 1/7/11 showing gastritis     Iron deficiency anemia 2015    Patient has iron deficiency anemia. Has seen hematology. Anemia is most likely due to heavy menstrual bleeding. Hematology recommended oral iron 3 times daily for 8 weeks. If she is responding, then she can continue on the oral iron therapy. The hematologist recommended 3 months of replacement therapy. She finished her iron supplement, which she was taking twice daily, and is not taking any supple     Perforated appendicitis      Post-operative wound abscess      Current Outpatient Medications   Medication Sig Dispense Refill     IBUPROFEN PO Take 400 mg by mouth every 6 hours as needed for moderate pain       loratadine (CLARITIN) 10 MG tablet Take 10 mg by mouth daily as needed for allergies       omeprazole (PRILOSEC OTC) 20 MG EC tablet Take 1 tablet (20 mg) by mouth daily 30 tablet 0     OMEPRAZOLE PO Take 20 mg by mouth daily as needed       order for DME Equipment being ordered: 7 1/2 short boot 1 Device 0     Social History     Tobacco Use     Smoking status: Never Smoker     Smokeless tobacco: Never Used   Substance Use Topics     Alcohol use: Yes     Comment: occasional        ROS:  10 point ROS negative except as listed above      OBJECTIVE:  /62   Pulse 64   Temp 97.6  F (36.4  C) (Tympanic)   Wt 73.3 kg (161 lb 11.2 oz)   LMP 03/20/2018 (Approximate)   SpO2 98%   BMI 29.58 kg/m    GENERAL  APPEARANCE: healthy, alert and no distress  EYES: conjunctiva clear  HENT: ear canals and TM's normal.  Nose and mouth without ulcers, erythema or lesions  NECK: supple, nontender, no lymphadenopathy  RESP: lungs clear to auscultation - no rales, rhonchi or wheezes  CV: regular rates and rhythm, normal S1 S2, no murmur noted  ABDOMEN:  soft, nontender, no HSM or masses and bowel sounds normal  NEURO: Normal strength and tone, sensory exam grossly normal,  normal speech and mentation  SKIN: no suspicious lesions or rashes      Results for orders placed or performed in visit on 06/16/20   XR Chest 2 Views     Status: None    Narrative    XR CHEST 2 VW  6/16/2020 3:01 PM       INDICATION: globus at sternum  COMPARISON: 10/24/2019       Impression    IMPRESSION: Negative chest.    SAAD VIEYRA MD   Results for orders placed or performed in visit on 06/16/20   CBC with platelets and differential     Status: None   Result Value Ref Range    WBC 8.3 4.0 - 11.0 10e9/L    RBC Count 4.98 3.8 - 5.2 10e12/L    Hemoglobin 14.8 11.7 - 15.7 g/dL    Hematocrit 45.0 35.0 - 47.0 %    MCV 90 78 - 100 fl    MCH 29.7 26.5 - 33.0 pg    MCHC 32.9 31.5 - 36.5 g/dL    RDW 12.4 10.0 - 15.0 %    Platelet Count 157 150 - 450 10e9/L    % Neutrophils 58.7 %    % Lymphocytes 29.9 %    % Monocytes 6.7 %    % Eosinophils 4.3 %    % Basophils 0.4 %    Absolute Neutrophil 4.9 1.6 - 8.3 10e9/L    Absolute Lymphocytes 2.5 0.8 - 5.3 10e9/L    Absolute Monocytes 0.6 0.0 - 1.3 10e9/L    Absolute Eosinophils 0.4 0.0 - 0.7 10e9/L    Absolute Basophils 0.0 0.0 - 0.2 10e9/L    Diff Method Automated Method      XRAY: no evidence of pneumonia or obstructive material      ASSESSMENT:  (R09.89) Globus sensation  (primary encounter diagnosis)  Comment: Possibly gastritis  Plan: XR Chest 2 Views, CBC with platelets and         differential, omeprazole (PRILOSEC OTC) 20 MG         EC tablet      Red flags and emergent follow up discussed, and understood by  patient  Follow up with PCP if symptoms worsen or fail to improve    Patient Instructions   Follow up if symptoms worsen or fail to improve      Patient Education     Gastritis (Adult)    Gastritis is inflammation and irritation of the stomach lining. You can have it for a short time (acute) or be long lasting (chronic). Infection with bacteria called H pylori most often causes gastritis. More than a third of people in the [place] have these bacteria in their bodies. In many cases, H pylori causes no problems or symptoms. In some people, though, the infection irritates the stomach lining and causes gastritis. H. pylori may be diagnosed through blood, stool, or breath tests, we well as through biopsy during an endoscopy. Other causes of stomach irritation include drinking alcohol, smoking or chewing tobacco, or taking pain-relieving medicines called NSAIDs (such as aspirin or ibuprofen). Certain drugs (such as cocaine) and immune conditions can also cause gastritis.  Symptoms of gastritis can include:    Belly pain or bloating    Feeling full quickly    Loss of appetite    Nausea or vomiting    Vomiting blood or having black stools    Feeling more tired than usual  An inflamed and irritated stomach lining is more likely to develop a sore called an ulcer. To help prevent this, gastritis should be treated.  Home care  If needed, our healthcare provider may prescribe medicines. If you have H pylori infection, treating it will likely relieve your symptoms. Other changes can help reduce stomach irritation and help it heal.    If you have been prescribed medicines for H pylori infection, take them as directed. Take all of the medicine until it is finished or your healthcare provider tells you to stop, even if you feel better.    Your healthcare provider may advise you not to take NSAIDs. If you take daily aspirin for your heart or other medical reasons, do not stop without talking to your healthcare provider first.    Don't  drink alcohol.    Stop smoking. Smoking can irritate the stomach and delay healing. As much as possible, stay away from second hand smoke.  Follow-up care  Follow up with your healthcare provider, or as advised by our staff. You may need testing to check for inflammation or an ulcer.  When to seek medical advice  Call your healthcare provider for any of the following:    Stomach pain that gets worse or moves to the lower right belly (appendix area)    Chest pain that appears or gets worse, or spreads to the back, neck, shoulder, or arm    Frequent vomiting (can t keep down liquids)    Blood in the stool or vomit (red or black in color)    Feeling weak or dizzy    Shortness of breath    Unexplained weight loss    Fever of 100.4 F (38 C) or higher, or as directed by your healthcare provider  Date Last Reviewed: 3/1/2018    3952-2765 [employer]. [address] [place] [address], [place] [address]. All rights reserved. This information is not intended as a substitute for professional medical care. Always follow your healthcare professional's instructions.

## 2020-07-31 ENCOUNTER — ANCILLARY PROCEDURE (OUTPATIENT)
Dept: MAMMOGRAPHY | Facility: CLINIC | Age: 50
End: 2020-07-31
Payer: COMMERCIAL

## 2020-07-31 DIAGNOSIS — Z12.31 VISIT FOR SCREENING MAMMOGRAM: ICD-10-CM

## 2020-07-31 PROCEDURE — 77067 SCR MAMMO BI INCL CAD: CPT | Mod: TC

## 2020-11-12 RX ORDER — PREDNISONE 5 MG/1
TABLET ORAL
COMMUNITY
Start: 2019-12-16 | End: 2020-11-13

## 2020-11-12 NOTE — PATIENT INSTRUCTIONS
Great to see you - I'm thinking of your mom and family.     Checking liver labs and we'll do an ultrasound - if they look different from last year I'll have you see GI. Otherwise let's plan for GI next spring.     Referral to the eye doctor - call your insurance. Referral is for drooping eyelids for consideration of Functional Blepharoplasty. You'll need to call to schedule.     Preventive Health Recommendations  Female Ages 50 - 64    Yearly exam: See your health care provider every year in order to  o Review health changes.   o Discuss preventive care.    o Review your medicines if your doctor has prescribed any.      Get a Pap test every three years (unless you have an abnormal result and your provider advises testing more often).    If you get Pap tests with HPV test, you only need to test every 5 years, unless you have an abnormal result.     You do not need a Pap test if your uterus was removed (hysterectomy) and you have not had cancer.    You should be tested each year for STDs (sexually transmitted diseases) if you're at risk.     Have a mammogram every 1 to 2 years.    Have a colonoscopy at age 50, or have a yearly FIT test (stool test). These exams screen for colon cancer.      Have a cholesterol test every 5 years, or more often if advised.    Have a diabetes test (fasting glucose) every three years. If you are at risk for diabetes, you should have this test more often.     If you are at risk for osteoporosis (brittle bone disease), think about having a bone density scan (DEXA).    Shots: Get a flu shot each year. Get a tetanus shot every 10 years.    Nutrition:     Eat at least 5 servings of fruits and vegetables each day.    Eat whole-grain bread, whole-wheat pasta and brown rice instead of white grains and rice.    Get adequate Calcium and Vitamin D.     Lifestyle    Exercise at least 150 minutes a week (30 minutes a day, 5 days a week). This will help you control your weight and prevent  disease.    Limit alcohol to one drink per day.    No smoking.     Wear sunscreen to prevent skin cancer.     See your dentist every six months for an exam and cleaning.    See your eye doctor every 1 to 2 years.

## 2020-11-12 NOTE — PROGRESS NOTES
"   SUBJECTIVE:   CC: Mirta Sims is an 50 year old woman who presents for preventive health visit.   Patient has been advised of split billing requirements and indicates understanding: {Yes and No:736084}   HPI            Today's PHQ-2 Score:   PHQ-2 ( 1999 [employer]) 2/22/2019   Q1: Little interest or pleasure in doing things 0   Q2: Feeling down, depressed or hopeless 0   PHQ-2 Score 0   Q1: Little interest or pleasure in doing things Not at all   Q2: Feeling down, depressed or hopeless Not at all   PHQ-2 Score 0   Abuse: Current or Past (Physical, Sexual or Emotional) - { :818559}  Do you feel safe in your environment? { :279783}  Social History     Tobacco Use     Smoking status: Never Smoker     Smokeless tobacco: Never Used   Substance Use Topics     Alcohol use: Yes     Comment: occasional      Alcohol Use 2/22/2019   Prescreen: >3 drinks/day or >7 drinks/week? No   Prescreen: >3 drinks/day or >7 drinks/week? -   Reviewed orders with patient.  Reviewed health maintenance and updated orders accordingly - { :735982::\"Yes\"}  {Chronicprobdata (optional):144535}    {Mammo Decision Support (Optional):130031}    Pertinent mammograms are reviewed under the imaging tab.  History of abnormal Pap smear: { :417650}  PAP / HPV Latest Ref Rng & Units 2/22/2019   PAP - NIL   HPV 16 DNA NEG:Negative Negative   HPV 18 DNA NEG:Negative Negative   OTHER HR HPV NEG:Negative Negative     Reviewed and updated as needed this visit by clinical staff                 Reviewed and updated as needed this visit by Provider                {HISTORY OPTIONS (Optional):656621}    Review of Systems  {FEMALE ROS (Optional):585332}     OBJECTIVE:   LMP 03/20/2018 (Approximate)   Physical Exam  {Exam Choices (Optional):239067}    {Diagnostic Test Results (Optional):325551::\"Diagnostic Test Results:\",\"Labs reviewed in Epic\"}    ASSESSMENT/PLAN:   {Diag Picklist:365752}    Patient has been advised of split billing requirements and indicates " "understanding: {YES / NO:877956::\"Yes\"}  COUNSELING:  {FEMALE COUNSELING MESSAGES:385832::\"Reviewed preventive health counseling, as reflected in patient instructions\"}    Estimated body mass index is 29.58 kg/m  as calculated from the following:    Height as of 12/30/19: 1.575 m (5' 2\").    Weight as of 6/16/20: 73.3 kg (161 lb 11.2 oz).    {Weight Management Plan (ACO) Complete if BMI is abnormal-  Ages 18-64  BMI >24.9.  Age 65+ with BMI <23 or >30 (Optional):146486}    She reports that she has never smoked. She has never used smokeless tobacco.      Counseling Resources:  ATP IV Guidelines  Pooled Cohorts Equation Calculator  Breast Cancer Risk Calculator  BRCA-Related Cancer Risk Assessment: FHS-7 Tool  FRAX Risk Assessment  ICSI Preventive Guidelines  Dietary Guidelines for Americans, 2010  USDA's MyPlate  ASA Prophylaxis  Lung CA Screening    Nayan Holloway MD  [facility]  "

## 2020-11-13 ENCOUNTER — OFFICE VISIT (OUTPATIENT)
Dept: PEDIATRICS | Facility: CLINIC | Age: 50
End: 2020-11-13
Payer: COMMERCIAL

## 2020-11-13 VITALS
BODY MASS INDEX: 30.73 KG/M2 | OXYGEN SATURATION: 99 % | RESPIRATION RATE: 13 BRPM | TEMPERATURE: 97.9 F | HEART RATE: 65 BPM | HEIGHT: 62 IN | DIASTOLIC BLOOD PRESSURE: 62 MMHG | WEIGHT: 167 LBS | SYSTOLIC BLOOD PRESSURE: 118 MMHG

## 2020-11-13 DIAGNOSIS — K29.70 GASTRITIS WITHOUT BLEEDING, UNSPECIFIED CHRONICITY, UNSPECIFIED GASTRITIS TYPE: ICD-10-CM

## 2020-11-13 DIAGNOSIS — B18.1 CHRONIC HEPATITIS B (H): ICD-10-CM

## 2020-11-13 DIAGNOSIS — Z13.1 SCREENING FOR DIABETES MELLITUS: ICD-10-CM

## 2020-11-13 DIAGNOSIS — H02.403 PTOSIS OF BOTH EYELIDS: ICD-10-CM

## 2020-11-13 DIAGNOSIS — Z00.00 ROUTINE GENERAL MEDICAL EXAMINATION AT A HEALTH CARE FACILITY: Primary | ICD-10-CM

## 2020-11-13 DIAGNOSIS — E55.9 VITAMIN D DEFICIENCY: ICD-10-CM

## 2020-11-13 DIAGNOSIS — Z11.59 NEED FOR HEPATITIS C SCREENING TEST: ICD-10-CM

## 2020-11-13 LAB
AFP SERPL-MCNC: <1.5 UG/L (ref 0–8)
ALBUMIN SERPL-MCNC: 3.8 G/DL (ref 3.4–5)
ALP SERPL-CCNC: 80 U/L (ref 40–150)
ALT SERPL W P-5'-P-CCNC: 24 U/L (ref 0–50)
ANION GAP SERPL CALCULATED.3IONS-SCNC: 2 MMOL/L (ref 3–14)
AST SERPL W P-5'-P-CCNC: 20 U/L (ref 0–45)
BILIRUB SERPL-MCNC: 1.1 MG/DL (ref 0.2–1.3)
BUN SERPL-MCNC: 18 MG/DL (ref 7–30)
CALCIUM SERPL-MCNC: 9.2 MG/DL (ref 8.5–10.1)
CHLORIDE SERPL-SCNC: 108 MMOL/L (ref 94–109)
CO2 SERPL-SCNC: 29 MMOL/L (ref 20–32)
CREAT SERPL-MCNC: 0.7 MG/DL (ref 0.52–1.04)
DEPRECATED CALCIDIOL+CALCIFEROL SERPL-MC: 34 UG/L (ref 20–75)
GFR SERPL CREATININE-BSD FRML MDRD: >90 ML/MIN/{1.73_M2}
GLUCOSE SERPL-MCNC: 91 MG/DL (ref 70–99)
HCV AB SERPL QL IA: NONREACTIVE
POTASSIUM SERPL-SCNC: 3.8 MMOL/L (ref 3.4–5.3)
PROT SERPL-MCNC: 7.5 G/DL (ref 6.8–8.8)
SODIUM SERPL-SCNC: 139 MMOL/L (ref 133–144)

## 2020-11-13 PROCEDURE — 87517 HEPATITIS B DNA QUANT: CPT | Performed by: INTERNAL MEDICINE

## 2020-11-13 PROCEDURE — 90471 IMMUNIZATION ADMIN: CPT | Performed by: INTERNAL MEDICINE

## 2020-11-13 PROCEDURE — 82105 ALPHA-FETOPROTEIN SERUM: CPT | Performed by: INTERNAL MEDICINE

## 2020-11-13 PROCEDURE — 80053 COMPREHEN METABOLIC PANEL: CPT | Performed by: INTERNAL MEDICINE

## 2020-11-13 PROCEDURE — 36415 COLL VENOUS BLD VENIPUNCTURE: CPT | Performed by: INTERNAL MEDICINE

## 2020-11-13 PROCEDURE — 82306 VITAMIN D 25 HYDROXY: CPT | Performed by: INTERNAL MEDICINE

## 2020-11-13 PROCEDURE — 90732 PPSV23 VACC 2 YRS+ SUBQ/IM: CPT | Performed by: INTERNAL MEDICINE

## 2020-11-13 PROCEDURE — 99213 OFFICE O/P EST LOW 20 MIN: CPT | Mod: 25 | Performed by: INTERNAL MEDICINE

## 2020-11-13 PROCEDURE — 99396 PREV VISIT EST AGE 40-64: CPT | Mod: 25 | Performed by: INTERNAL MEDICINE

## 2020-11-13 PROCEDURE — 86803 HEPATITIS C AB TEST: CPT | Performed by: INTERNAL MEDICINE

## 2020-11-13 RX ORDER — MULTIVIT-MIN/IRON/FOLIC ACID/K 18-600-40
1000 CAPSULE ORAL
COMMUNITY

## 2020-11-13 RX ORDER — CALCIUM CARBONATE 500 MG/1
1 TABLET, CHEWABLE ORAL
COMMUNITY

## 2020-11-13 ASSESSMENT — MIFFLIN-ST. JEOR: SCORE: 1320.76

## 2020-11-13 NOTE — PROGRESS NOTES
SUBJECTIVE:   CC: Mirta Sims is an 50 year old woman who presents for preventive health visit.   Patient has been advised of split billing requirements and indicates understanding: Yes     Healthy Habits:    Do you get at least three servings of calcium containing foods daily (dairy, green leafy vegetables, etc.)? yes    Amount of exercise or daily activities, outside of work: 3 day(s) per week    Problems taking medications regularly No    Medication side effects: No    Have you had an eye exam in the past two years? yes    Do you see a dentist twice per year? yes    Do you have sleep apnea, excessive snoring or daytime drowsiness?no    Today's PHQ-2 Score:   PHQ-2 (  [employer]) 2020   Q1: Little interest or pleasure in doing things 0 0   Q2: Feeling down, depressed or hopeless 0 0   PHQ-2 Score 0 0   Q1: Little interest or pleasure in doing things - Not at all   Q2: Feeling down, depressed or hopeless - Not at all   PHQ-2 Score - 0   Abuse: Current or Past(Physical, Sexual or Emotional)- No  Do you feel safe in your environment? Yes  Social History     Tobacco Use     Smoking status: Never Smoker     Smokeless tobacco: Never Used   Substance Use Topics     Alcohol use: Yes     Comment: occasional      If you drink alcohol do you typically have >3 drinks per day or >7 drinks per week? No                     Reviewed orders with patient.  Reviewed health maintenance and updated orders accordingly - Yes  - got flu shot 10/14/20    # Social  - Mom just passed away from [facility], had to say goodbye on cell phone. She was 91. Planning a  in a few weeks. Typically ong culture would be 4 days, only doing to do 1-2, small group.      # chronic hep b  - follows with [facility]  - last OV 2019 - wanted 6 month f/up  - last US in May 2019    # intermittent burning in back, right side  - no rash  - occasionally needs a tylenol  - sometimes during the day, better at night    The 10-year ASCVD risk score  (Ziggy ALVARES Jr., et al., 2013) is: 1%    Values used to calculate the score:      Age: 50 years      Sex: Female      Is Non- : No      Diabetic: No      Tobacco smoker: No      Systolic Blood Pressure: 118 mmHg      Is BP treated: No      HDL Cholesterol: 61 mg/dL      Total Cholesterol: 188 mg/dL    Mammogram Screening: Patient over age 50, mutual decision to screen reflected in health maintenance.    Pertinent mammograms are reviewed under the imaging tab.  History of abnormal Pap smear: NO - age 30-65 PAP every 5 years with negative HPV co-testing recommended  PAP / HPV Latest Ref Rng & Units 2/22/2019   PAP - NIL   HPV 16 DNA NEG:Negative Negative   HPV 18 DNA NEG:Negative Negative   OTHER HR HPV NEG:Negative Negative     Reviewed and updated as needed this visit by clinical staff  Tobacco  Allergies  Meds   Med Hx  Surg Hx  Fam Hx  Soc Hx      Reviewed and updated as needed this visit by Provider                Past Medical History:   Diagnosis Date     Chronic hepatitis B (H)     Likely vertical transmission, follows with [facility]. Low viral low, no tx. Monitoring for HCC to start at age 48.     Gastritis 2011    endoscopy 1/7/11 showing gastritis     Iron deficiency anemia 2015    Patient has iron deficiency anemia. Has seen hematology. Anemia is most likely due to heavy menstrual bleeding. Hematology recommended oral iron 3 times daily for 8 weeks. If she is responding, then she can continue on the oral iron therapy. The hematologist recommended 3 months of replacement therapy. She finished her iron supplement, which she was taking twice daily, and is not taking any supple     Perforated appendicitis      Post-operative wound abscess       Past Surgical History:   Procedure Laterality Date     APPENDECTOMY  12/26/2016     CHOLECYSTECTOMY       HC HYSTEROS W PERMANENT FALLOPAIN IMPLANT       LAPAROSCOPIC APPENDECTOMY N/A 4/3/2018    Procedure: LAPAROSCOPIC APPENDECTOMY;  LAPAROSCOPIC  "APPENDECTOMY   ;  Surgeon: Clarence Wayne MD;  Location: [facility] OR     LAPAROTOMY EXPLORATORY  01/22/2017    Procedure: LAPAROSCOPY CONVERTED TO EXPLORATORY LAPAROTOMY WITH EVACUATION OF RETROCOLONIC ABSCESS X 2; Surgeon: Johnnie Dinero DO; Location: [facility] OR; Service: General     MESH (IMPLANTABLE)  01/22/2017    Film Anti Adhesion Sepra 4301-02 - Sn.A. Abdomen       ROS:  CONSTITUTIONAL: NEGATIVE for fever, chills, change in weight  INTEGUMENTARY/SKIN: NEGATIVE for worrisome rashes, moles or lesions  EYES: NEGATIVE for vision changes or irritation  ENT: NEGATIVE for ear, mouth and throat problems  RESP: NEGATIVE for significant cough or SOB  BREAST: NEGATIVE for masses, tenderness or discharge  CV: NEGATIVE for chest pain, palpitations or peripheral edema  GI: NEGATIVE for nausea, abdominal pain, heartburn, or change in bowel habits  : NEGATIVE for unusual urinary or vaginal symptoms. No vaginal bleeding.  MUSCULOSKELETAL: NEGATIVE for significant arthralgias or myalgia  NEURO: NEGATIVE for weakness, dizziness or paresthesias  PSYCHIATRIC: NEGATIVE for changes in mood or affect     OBJECTIVE:   /62 (BP Location: Right arm, Patient Position: Chair, Cuff Size: Adult Regular)   Pulse 65   Temp 97.9  F (36.6  C) (Tympanic)   Resp 13   Ht 1.575 m (5' 2\")   Wt 75.8 kg (167 lb)   LMP 03/20/2018 (Approximate)   SpO2 99%   BMI 30.54 kg/m    EXAM:  GENERAL: healthy, alert and no distress  EYES: Eyes grossly normal to inspection, PERRL and conjunctivae and sclerae normal  HENT: ear canals and TM's normal, nose and mouth without ulcers or lesions  NECK: no adenopathy, no asymmetry, masses, or scars and thyroid normal to palpation  RESP: lungs clear to auscultation - no rales, rhonchi or wheezes  CV: regular rate and rhythm, normal S1 S2, no S3 or S4, no murmur, click or rub, no peripheral edema and peripheral pulses strong  ABDOMEN: soft, nontender, no hepatosplenomegaly, no masses and bowel " "sounds normal  MS: no gross musculoskeletal defects noted, no edema  SKIN: no suspicious lesions or rashes  NEURO: Normal strength and tone, mentation intact and speech normal  PSYCH: mentation appears normal, affect normal/bright    Diagnostic Test Results:  Labs reviewed in Epic    ASSESSMENT/PLAN:   1. Routine general medical examination at a health care facility  - utd mammo  - utd pap  - utd colon  - already got flu shot  - will get pneumovax today given chronic hep b  - unable to find hep c screening results, will check     2. Chronic hepatitis B (H)  Follows with [facility] - prefers to avoid additional OV right now with covid. Last OV Spring 2019 - they were planning q6 mo f/up. Will check labs and ultrasound and if any changes will have her see them now, otherwise f/up this spring.   - Comprehensive metabolic panel (BMP + Alb, Alk Phos, ALT, AST, Total. Bili, TP)  - AFP tumor marker  - Hep B Virus DNA Quant Real Time PCR  - US Abdomen Limited; Future    3. Ptosis of both eyelids  Interfering with vision. She is not sure of insurance and will check before scheduling.   - EYE ADULT REFERRAL; Future    4. Vitamin D deficiency  - Vitamin D Deficiency    5. Gastritis without bleeding, unspecified chronicity, unspecified gastritis type  - omeprazole (PRILOSEC) 20 MG DR capsule; Take 1 capsule (20 mg) by mouth daily as needed (Reflux)  Dispense: 90 capsule; Refill: 3    6. Need for hepatitis C screening test  - **Hepatitis C Screen Reflex to RNA FUTURE anytime    7. Screening for diabetes mellitus  - Comprehensive metabolic panel (BMP + Alb, Alk Phos, ALT, AST, Total. Bili, TP)    COUNSELING:   Reviewed preventive health counseling, as reflected in patient instructions       Regular exercise       Healthy diet/nutrition       Immunizations       Osteoporosis prevention/bone health    Estimated body mass index is 30.54 kg/m  as calculated from the following:    Height as of this encounter: 1.575 m (5' 2\").    Weight as " of this encounter: 75.8 kg (167 lb).    Weight management plan: Discussed healthy diet and exercise guidelines    She reports that she has never smoked. She has never used smokeless tobacco.    Counseling Resources:  ATP IV Guidelines  Pooled Cohorts Equation Calculator  Breast Cancer Risk Calculator  BRCA-Related Cancer Risk Assessment: FHS-7 Tool  FRAX Risk Assessment  ICSI Preventive Guidelines  Dietary Guidelines for Americans, 2010  USDA's MyPlate  ASA Prophylaxis  Lung CA Screening    Nayan Holloway MD  [facility]

## 2020-11-14 LAB
HBV DNA SERPL NAA+PROBE-ACNC: NORMAL [IU]/ML
HBV DNA SERPL NAA+PROBE-LOG IU: NORMAL {LOG_IU}/ML

## 2021-02-15 ENCOUNTER — TRANSFERRED RECORDS (OUTPATIENT)
Dept: HEALTH INFORMATION MANAGEMENT | Facility: CLINIC | Age: 51
End: 2021-02-15

## 2021-02-15 LAB
ALT SERPL-CCNC: 28 U/L (ref 0–78)
AST SERPL-CCNC: 22 U/L (ref 0–37)
CREAT SERPL-MCNC: 0.59 MG/DL (ref 0.6–1.02)
GFR SERPL CREATININE-BSD FRML MDRD: >60 ML/MIN/1.73M2

## 2021-03-29 ENCOUNTER — TRANSFERRED RECORDS (OUTPATIENT)
Dept: HEALTH INFORMATION MANAGEMENT | Facility: CLINIC | Age: 51
End: 2021-03-29

## 2021-04-26 ENCOUNTER — COMMUNICATION - HEALTHEAST (OUTPATIENT)
Dept: CARDIOLOGY | Facility: CLINIC | Age: 51
End: 2021-04-26

## 2021-05-27 ENCOUNTER — RECORDS - HEALTHEAST (OUTPATIENT)
Dept: ADMINISTRATIVE | Facility: CLINIC | Age: 51
End: 2021-05-27

## 2021-05-29 ENCOUNTER — RECORDS - HEALTHEAST (OUTPATIENT)
Dept: ADMINISTRATIVE | Facility: CLINIC | Age: 51
End: 2021-05-29

## 2021-05-30 ENCOUNTER — RECORDS - HEALTHEAST (OUTPATIENT)
Dept: ADMINISTRATIVE | Facility: CLINIC | Age: 51
End: 2021-05-30

## 2021-05-30 VITALS — HEIGHT: 62 IN | BODY MASS INDEX: 27.71 KG/M2 | WEIGHT: 150.6 LBS

## 2021-05-30 VITALS — HEIGHT: 62 IN | BODY MASS INDEX: 26.87 KG/M2 | WEIGHT: 146 LBS

## 2021-05-31 ENCOUNTER — RECORDS - HEALTHEAST (OUTPATIENT)
Dept: ADMINISTRATIVE | Facility: CLINIC | Age: 51
End: 2021-05-31

## 2021-06-03 ENCOUNTER — RECORDS - HEALTHEAST (OUTPATIENT)
Dept: ADMINISTRATIVE | Facility: CLINIC | Age: 51
End: 2021-06-03

## 2021-06-08 NOTE — PROGRESS NOTES
HPI: Pt is here for follow up of a lap appy and subsequent abscess formation.   she is doing well.  Pain is well controlled.  No difficulties with the surgical wound/wounds.  she is eating well and denies fever and chills.   No drainage past 24 hours in SATURNINO      There were no vitals taken for this visit.    EXAM:  GENERAL:Appears well  ABDOMEN:  Soft, +BS  SURGICAL WOUNDS:  Incisions healing well, no enduration or drainage. SATURNINO- minimal to no drainage. Serous fluid in tube.     .lastlab[CASEREPORT    Assessment/Plan: . Doing well after surgery. For follow up abscess will plan to repeat CT  Fatemeh Fall PA-C  [facility] Department of Surgery

## 2021-06-08 NOTE — PROGRESS NOTES
HPI: Pt is here with concerns of increasing pain around her vertical incision from her her recent exploratory laparotomy with abscess removal.  She is taking dilaudid Q4hrs.  Complains of some sweats at night but has not checked her temperature.  She has been able to eat and drink without any problem.    Visit Vitals     LMP 12/26/2016       EXAM:  GENERAL:Appears well, no acute distress  ABDOMEN:  Soft, +BS, minimal tenderness RLQ  SURGICAL WOUNDS:  Incisions healing well however there is a small area on the left lateral side of the wound that feels slightly endurated, some erythema.  Unable to express any fluid, the wound has not been draining per pt.     I asked Dr. Dinero to come in and assess her pain.  Together we opened the wound about 1cm and there was a small amount of blood return.  No pus noted, no s/s of infection. Packed with dry 2x2.    Assessment/Plan: .   Likely a hematoma that formed on the lateral side of her incision which caused her increase in pain.  Gave another refill of dilaudid however we discussed weaning off as her pain should be improving and she an substitute with tylenol ibuprofen.  Finish augmentin as previously prescribed.  Daily dressing changes with dry gauze.  Follow up in 1 week as needed.       Chet Pearce, [facility] Department of Surgery

## 2021-06-08 NOTE — ANESTHESIA PREPROCEDURE EVALUATION
Anesthesia Evaluation      Patient summary reviewed   No history of anesthetic complications     Airway   Mallampati: II  Neck ROM: full   Pulmonary - negative ROS and normal exam                          Cardiovascular - negative ROS and normal exam   Neuro/Psych - negative ROS   (-) no neuromuscular disease    Endo/Other - negative ROS      GI/Hepatic/Renal - negative ROS      Other findings: Had appy, now with post-op abscess. Chronic hepatitis B.  Gastritis.  Fe deficiency anemia.  Hg 11.      Dental - normal exam                        Anesthesia Plan  Planned anesthetic: general endotracheal    ASA 2 - emergent   Induction: intravenous   Anesthetic plan and risks discussed with: patient  Anesthesia plan special considerations: rapid sequence induction,   Post-op plan: routine recovery

## 2021-06-08 NOTE — PROGRESS NOTES
Subjective:     Mirta is a 46 y.o. female presenting to the clinic for a female physical.     LMP: last Sunday, regular   Hx of abnormal pap smear: none  Last pap smear: 1/29/16  Perform self-breast exams: yes  Vaginal discharge or irritation: none  Sexually active: yes,  6 years   Contraception: essure   Concerns for STDs: she has chronic hepatitis B.   Previous pregnancies:three    47 yo female status post lap appendectomy 12/26, complicated by abscess and drain placed. Drain removed 1/3. Returns with abdominal pain repeat CT with recurrence of abscess. Underwent exploratory laparotomy with evacuation of retrocolonic abscess x 2.  Patient was discharged with Augmentin and Hydromorphone. She says that her incision is a little sore. She denies any drainage from the incision.      Patient also presents for a travel consult.  She will try be traveling with her  April 17-April 21 to [facility].  She has not traveled outside the [facility] before.  She is feeling well today.  She denies rhinorrhea, headache, stomachache, nausea, vomiting, and fever.       Review of systems:  I performed a 10 point review of systems.  All pertinent positives and negatives are noted in the HPI. All others are negative.     Allergies   Allergen Reactions     Strawberry Swelling     Azithromycin Rash     Annotation: on face       Tomato Swelling       Current Outpatient Prescriptions on File Prior to Visit   Medication Sig Dispense Refill     acetaminophen (TYLENOL) 325 MG tablet Take 650 mg by mouth every 6 (six) hours as needed for pain.       amoxicillin-clavulanate (AUGMENTIN) 875-125 mg per tablet Take 1 tablet by mouth 2 (two) times a day for 21 days. 42 tablet 0     calcium, as carbonate, (TUMS) 200 mg calcium (500 mg) chewable tablet Chew 1 tablet every 4 (four) hours as needed for heartburn.        HYDROmorphone (DILAUDID) 2 MG tablet Take 1 tablet (2 mg total) by mouth every 4 (four) hours as needed for pain. 10  tablet 0     loratadine (CLARITIN) 10 mg tablet Take 10 mg by mouth daily as needed for allergies.        omeprazole (PRILOSEC) 20 MG capsule TAKE 1 CAPSULE (20 MG TOTAL) BY MOUTH DAILY. 90 capsule 0     HYDROmorphone (DILAUDID) 2 MG tablet Take 1 tablet (2 mg total) by mouth every 4 (four) hours as needed for pain. 30 tablet 0     No current facility-administered medications on file prior to visit.        Social History     Social History     Marital status:      Spouse name: N/A     Number of children: N/A     Years of education: N/A     Occupational History     Not on file.     Social History Main Topics     Smoking status: Never Smoker     Smokeless tobacco: Never Used     Alcohol use No      Comment: Occasional      Drug use: No     Sexual activity: Yes     Partners: Male      Comment:      Other Topics Concern     Not on file     Social History Narrative       Past Medical History:   Diagnosis Date     Anemia      Carpal tunnel syndrome      Chronic abdominal pain      Gastritis      Hepatitis B        Family History   Problem Relation Age of Onset     Hypertension Mother      Diabetes Mother      Heart disease Brother        Past Surgical History:   Procedure Laterality Date     APPENDECTOMY       CHOLECYSTECTOMY       AL LAP,APPENDECTOMY N/A 12/26/2016    Procedure: APPENDECTOMY, LAPAROSCOPIC;  Surgeon: Johnnie Dinero DO;  Location: [facility];  Service: General     AL LAP,DIAGNOSTIC ABDOMEN N/A 1/22/2017    Procedure: LAPAROSCOPY CONVERTED TO EXPLORATORY LAPAROTOMY WITH EVACUATION OF RETROCOLONIC ABSCESS X 2;  Surgeon: Johnnie Dinero DO;  Location: [facility];  Service: General     AL OCCLUDE FALLOPIAN TUBE BY DEVICE      Description: Oviductal Surgery Tubal Occlusion By Device;  Recorded: 04/13/2012;     UPPER GASTROINTESTINAL ENDOSCOPY         Objective:     Vitals:    02/09/17 0659   BP: 104/72   Pulse: 74   Resp: 16   Temp: 97.7  F (36.5  C)       Patient is alert, no  obvious distress.   Skin: Warm, dry.  No rashes or lesions. Skin turgor rapid return.   HEENT:  Eyes normal.  Ears normal.  Nose patent, mucosa pink.  Oropharynx mucosa pink, no lesions or tonsil enlargement.   Neck:  Supple, without lymphadenopathy, bruits, JVD. Thyroid normal texture and size.    Lungs:  Clear to auscultation.  No wheezing, rales noted.  Respirations even and unlabored.   Heart:  Regular rate and rhythm.  No murmurs.   Breasts:  Normal.  No surrounding adenopathy.   Abdomen: Soft, nontender.  No organomegaly.  Bowel sounds normoactive.  No guarding or masses noted. She has healing incision with a large vertical incision centrally.  There is no erythema or drainage.  :  Deferred per patient.   Musculoskeletal:  Full ROM of extremities.  Muscle strength equal +5/5.   Neurological:  Cranial nerves 2-12 intact.      Assessment and Plan:     1. Routine general medical examination at a health care facility  Discussed considering a healthy diet and exercising.  Discussed importance of routine sunscreen.  Recommended taking a daily multivitamin.  She is due for mammogram.  She is up-to-date on vaccinations.  - Lipid Cascade    2. Overweight  The following high BMI interventions were performed this visit: exercise promotion: strength training, exercise promotion: stretching and nutrition therapy    3. Chronic Hepatitis, B Virus  Patient will continue to see Gastroenterology.   - Ambulatory referral to Gastroenterology    4. Post-operative wound abscess, sequela  This is healing well.  She will continue the Augmentin.  Discussed symptoms of infection.    5. Counseling about travel  Provide a prescription for typhoid vaccine.  She is to wait to take this until after she has taken the full course of the Augmentin.  Discussed importance of insect spray.  She plans on staying at a resort.  She will follow-up as needed.   - typhoid (VIVOTIF FABRICE VACCINE) SR capsule; Take 1 capsule by mouth every other day for  4 doses.  Dispense: 4 capsule; Refill: 0

## 2021-06-08 NOTE — ANESTHESIA CARE TRANSFER NOTE
Last vitals:   Vitals:    01/22/17 1553   BP: 122/57   Pulse: (!) 56   Resp: 18   Temp: 37.9  C (100.2  F)   SpO2: 100%     Patient's level of consciousness is drowsy  Spontaneous respirations: yes  Maintains airway independently: yes  Dentition unchanged: yes  Oropharynx: oropharynx clear of all foreign objects    QCDR Measures:  ASA# 20 - Surgical Safety Checklist: ASA20A - Safety Checks Done  PQRS# 430 - Adult PONV Prevention: 4558F - Pt received => 2 anti-emetic agents (different classes) preop & intraop  ASA# 8 - Peds PONV Prevention: NA - Not pediatric patient, not GA or 2 or more risk factors NOT present  PQRS# 424 - Summer-op Temp Management: 4559F - At least one body temp DOCUMENTED => 35.5C or 95.9F within required timeframe  PQRS# 426 - PACU Transfer Protocol: - Transfer of care checklist used  ASA# 14 - Acute Post-op Pain: ASA14B - Patient did NOT experience pain >= 7 out of 10    I completed my SBAR handoff to the receiving nurse per policy and procedure.

## 2021-06-08 NOTE — PROGRESS NOTES
HPI: Pt is here for follow up of a exploratory lap with removal of abscess and SATURNINO drain placement.  The SATURNINO has been putting out 0 output since she was discharge.  Scant amount of serous fluid in bulb.  She is feeling well, taking dilaudid for pain.  She is tolerating her PO abx without difficulty.   No difficulties with the surgical wound/wounds.  she is eating well and denies fever and chills.         Visit Vitals     [facility] 12/26/2016       EXAM:  GENERAL:Appears well  ABDOMEN:  Soft, +BS.  No s/s of infection.  Slightly tender to palpation RLQ.  SATURNINO removed and covered with dry gauze.  Blue vessel loop removed from vertical incision.  Tolerated well.   SURGICAL WOUNDS:  Incisions healing well, no enduration or drainage.        Assessment/Plan: . Doing well after surgery and should follow up as needed.      Chet Pearce, [facility] Department of Surgery

## 2021-06-08 NOTE — ANESTHESIA POSTPROCEDURE EVALUATION
Patient: Mirta X Branch  LAPAROSCOPY CONVERTED TO EXPLORATORY LAPAROTOMY WITH EVACUATION OF RETROCOLONIC ABSCESS X 2  Anesthesia type: general    Patient location: PACU  Last vitals:   Vitals:    01/22/17 1640   BP: 102/62   Pulse: 64   Resp: 16   Temp: 37.3  C (99.2  F)   SpO2: 100%     Post vital signs: stable  Level of consciousness: alert and conversant.  Post-anesthesia pain: pain controlled  Post-anesthesia nausea and vomiting: no  Pulmonary: unassisted, return to baseline  Cardiovascular: stable and blood pressure at baseline  Hydration: adequate  Anesthetic events: no    QCDR Measures:  ASA# 11 - Summer-op Cardiac Arrest: ASA11B - Patient did NOT experience unanticipated cardiac arrest  ASA# 12 - Summer-op Mortality Rate: ASA12B - Patient did NOT die  ASA# 13 - PACU Re-Intubation Rate: ASA13B - Patient did NOT require a new airway mgmt  ASA# 10 - Composite Anes Safety: ASA10A - No serious adverse event  ASA# 38 - New Corneal Injury: ASA38A - No new exposure keratitis or corneal abrasion in PACU    Additional Notes:

## 2021-07-21 ENCOUNTER — RECORDS - HEALTHEAST (OUTPATIENT)
Dept: ADMINISTRATIVE | Facility: CLINIC | Age: 51
End: 2021-07-21

## 2021-08-13 ENCOUNTER — TRANSFERRED RECORDS (OUTPATIENT)
Dept: HEALTH INFORMATION MANAGEMENT | Facility: CLINIC | Age: 51
End: 2021-08-13

## 2021-08-13 ENCOUNTER — ANCILLARY PROCEDURE (OUTPATIENT)
Dept: MAMMOGRAPHY | Facility: CLINIC | Age: 51
End: 2021-08-13
Attending: INTERNAL MEDICINE
Payer: COMMERCIAL

## 2021-08-13 DIAGNOSIS — Z12.31 VISIT FOR SCREENING MAMMOGRAM: ICD-10-CM

## 2021-08-13 LAB
ALT SERPL-CCNC: 31 U/L (ref 0–78)
AST SERPL-CCNC: 30 U/L (ref 0–37)

## 2021-08-13 PROCEDURE — 77067 SCR MAMMO BI INCL CAD: CPT | Mod: TC | Performed by: RADIOLOGY

## 2021-08-29 ENCOUNTER — OFFICE VISIT (OUTPATIENT)
Dept: URGENT CARE | Facility: URGENT CARE | Age: 51
End: 2021-08-29
Payer: COMMERCIAL

## 2021-08-29 VITALS
DIASTOLIC BLOOD PRESSURE: 62 MMHG | OXYGEN SATURATION: 99 % | HEART RATE: 51 BPM | TEMPERATURE: 97.4 F | SYSTOLIC BLOOD PRESSURE: 98 MMHG

## 2021-08-29 DIAGNOSIS — R30.0 DYSURIA: ICD-10-CM

## 2021-08-29 DIAGNOSIS — N34.1 URETHRITIS, NONSPECIFIC: Primary | ICD-10-CM

## 2021-08-29 LAB
ALBUMIN UR-MCNC: NEGATIVE MG/DL
APPEARANCE UR: CLEAR
BACTERIA #/AREA URNS HPF: ABNORMAL /HPF
BILIRUB UR QL STRIP: NEGATIVE
CLUE CELLS: ABNORMAL
COLOR UR AUTO: YELLOW
GLUCOSE UR STRIP-MCNC: NEGATIVE MG/DL
HGB UR QL STRIP: ABNORMAL
KETONES UR STRIP-MCNC: NEGATIVE MG/DL
LEUKOCYTE ESTERASE UR QL STRIP: ABNORMAL
NITRATE UR QL: NEGATIVE
PH UR STRIP: 6.5 [PH] (ref 5–7)
RBC #/AREA URNS AUTO: ABNORMAL /HPF
SP GR UR STRIP: <=1.005 (ref 1–1.03)
SQUAMOUS #/AREA URNS AUTO: ABNORMAL /LPF
TRICHOMONAS, WET PREP: ABNORMAL
UROBILINOGEN UR STRIP-ACNC: 0.2 E.U./DL
WBC #/AREA URNS AUTO: ABNORMAL /HPF
WBC'S/HIGH POWER FIELD, WET PREP: ABNORMAL
YEAST, WET PREP: ABNORMAL

## 2021-08-29 PROCEDURE — 87210 SMEAR WET MOUNT SALINE/INK: CPT

## 2021-08-29 PROCEDURE — 99213 OFFICE O/P EST LOW 20 MIN: CPT | Performed by: PHYSICIAN ASSISTANT

## 2021-08-29 PROCEDURE — 81001 URINALYSIS AUTO W/SCOPE: CPT

## 2021-08-29 NOTE — PROGRESS NOTES
ASSESSMENT/PLAN:    (N34.1) Urethritis, nonspecific  (primary encounter diagnosis)    MDM: Atypical urinary dysuria and atypical urgency symptoms x 1 day. No convincing evidence of UTI on testing here today. Wet prep is negative for nuisance vaginal infection. No GYN symptoms. Patient is offered, but declines STI and HCG screening today. Please see below for further layperson medical decision making in below discharge summary.     Plan:     August 29, 2021 [facility] Plan:     I do not know what is causing your discomfort with urination. Your urine screening test does not suggest infection. I have ordered another urine test (called a urine culture) to check further for possible infection.     You were also screened for yeast infection and nuisance vaginal infection (your results were normal).     At this time, I suspect the new soap that you have been using for the past week may be causing your symptoms. Please stop using that soap.       Don't use any chemicals or products that you think may be causing your symptoms.    If you were given a prescription medicine, take as directed. Take it until it is all used up.    If a culture was taken, don't have sex until you have been told that it is negative. A negative culture means you don't have an infection. Then follow your healthcare provider's advice to treat your condition.    If your symptoms don't get better in 3 days, or if you develop any of the below, you should see your primary care doctor:         Fever of 100.4 F (38 C) or higher, or as directed by your provider    Back or belly pain that gets worse    You can't urinate because of pain    New discharge from the urethra, vagina      (R30.0) Dysuria  Plan: Wet preparation, UA reflex to Microscopic and         Culture, Urine Microscopic, Urine Culture         Aerobic Bacterial - lab collect      ------------------------------------------------------------    Mirta Sims is a 51 year old female who  presents to urgent care today for evaluation of symptoms of atypical urinary urgency and atypical dysuria symptoms x 1 day duration. Of note, onset of above symptoms began after using a new soap for the past week.         ROS:     CONSITUTIONAL: No fever, chills or acute onset weakness  CARDIAC: No acute chest pain or shortness of breath   RESP: No acute cough, chest pain or shortness of breath   GI: No acute onset abdominal pain. No acute onset vomiting/diarrhea  GYN: GYN: G 3--three children. . Menopausal since age 46 or 47. Essure for contraception. No pelvic pain. No vaginal discharge. Patient is offered, but declines STI and HCGscreening.   SKIN: No systemic rash or hives.           Past Medical History:   Diagnosis Date     Chronic hepatitis B (H)     Likely vertical transmission, follows with [facility]. Low viral low, no tx. Monitoring for HCC to start at age 48.     Gastritis 2011    endoscopy 1/7/11 showing gastritis     Iron deficiency anemia 2015    Patient has iron deficiency anemia. Has seen hematology. Anemia is most likely due to heavy menstrual bleeding. Hematology recommended oral iron 3 times daily for 8 weeks. If she is responding, then she can continue on the oral iron therapy. The hematologist recommended 3 months of replacement therapy. She finished her iron supplement, which she was taking twice daily, and is not taking any supple     Perforated appendicitis      Post-operative wound abscess        Current Outpatient Medications   Medication     calcium carbonate (TUMS) 500 MG chewable tablet     IBUPROFEN PO     loratadine (CLARITIN) 10 MG tablet     omeprazole (PRILOSEC) 20 MG DR capsule     order for DME     Vitamin D, Cholecalciferol, 25 MCG (1000 UT) TABS     No current facility-administered medications for this visit.       Allergies   Allergen Reactions     Strawberry Swelling     Tomato Swelling     Azithromycin Rash     Annotation: on face             OBJECTIVE:  BP 98/62   Pulse  51   Temp 97.4  F (36.3  C) (Tympanic)   LMP 03/20/2018 (Approximate)   SpO2 99%         GENERAL:  Very pleasant, comfortable and generally well appearing.  SKIN: No rashes.  Normal color.  Sclera clear.  CARDIAC:NORMAL - regular rate and rhythm without murmur., normal s1/s2 and without extra heart sounds  RESP: Normal - CTA without rales, rhonchi, or wheezing.  ABDOMEN:  Soft, non-tender, non-distended.  Positive normal bowel sounds.  No HSM or masses.  No suprapubic tenderness.  No CVA tenderness.  NEURO: Alert and oriented.  Normal speech and mentation.  CN II/XII grossly intact.  Gait within normal limits.      LAB:     Component      Latest Ref Rng & Units 8/29/2021   Color Urine      Colorless, Straw, Light Yellow, Yellow Yellow   Appearance Urine      Clear Clear   Glucose Urine      Negative mg/dL Negative   Bilirubin Urine      Negative Negative   Ketones Urine      Negative mg/dL Negative   Specific Gravity Urine      1.003 - 1.035 <=1.005   Blood Urine      Negative Small (A)   pH Urine      5.0 - 7.0 6.5   Protein Albumin Urine      Negative mg/dL Negative   Urobilinogen Urine      0.2, 1.0 E.U./dL 0.2   Nitrite Urine      Negative Negative   Leukocyte Esterase Urine      Negative Trace (A)   Bacteria Urine      None Seen /HPF Few (A)   RBC Urine      0-2 /HPF /HPF 0-2   WBC Urine      0-5 /HPF /HPF 0-5   Squamous Epithelial /LPF Urine      None Seen /LPF Few (A)     Component      Latest Ref Rng & Units 8/29/2021   Trichomonas      Absent Absent   Yeast      Absent Absent   Clue cells      Absent Absent   WBCs/high power field      None 1+ (A)

## 2021-08-29 NOTE — PATIENT INSTRUCTIONS
Patient Education     Dysuria with Uncertain Cause (Adult)    The urethra is the tube that allows urine to pass out of the body. In a woman, the urethra is the opening above the vagina. In men, the urethra is the opening on the tip of the penis. Dysuria is the feeling of pain or burning in the urethra when passing urine.   Dysuria can be caused by anything that irritates or inflames the urethra. An infection or chemical irritation can cause this reaction. A bladder infection is the most common cause of dysuria in adults. A urine test can diagnose this. A bladder infection needs antibiotic treatment.   Soaps, lotions, colognes, and feminine hygiene products can cause dysuria. So can birth control jellies, creams, and foams. It will go away 1 to 3 days after using these irritants.   Sexually transmitted infections (STIs) such as chlamydia or gonorrhea can cause dysuria. Your healthcare provider may take a culture sample. Your provider may start you on antibiotic medicine before the culture test returns.   In women who have gone through menopause, dysuria can be from dryness in the lining of the urethra. This can be treated with hormones. Dysuria becomes long-term (chronic) when it lasts for weeks or months. You may need to see a specialist (urologist) to diagnose and treat chronic dysuria.   Home care  These home care tips may help:    Don't use any chemicals or products that you think may be causing your symptoms.    If you were given a prescription medicine, take as directed. Take it until it is all used up.    If a culture was taken, don't have sex until you have been told that it is negative. A negative culture means you don't have an infection. Then follow your healthcare provider's advice to treat your condition.  If a culture was done and it is positive:     Both you and your sexual partner may need to be treated. This is true even if your partner has no symptoms.    Contact your healthcare provider or go to  an urgent care clinic or the public health department to be looked at and treated.    Don't have sex until both you and your partner have finished all antibiotics and your healthcare provider says you are no longer contagious.    Learn about and use safe sex practices. The safest sex is with a partner who has tested negative and only has sex with you. Condoms can prevent STIs from spreading, but they aren't a guarantee.  Follow-up care  Follow up with your healthcare provider, or as advised. If a culture was taken, you may call as directed for the results. If you have an STI, follow up with your provider or the public health department for a complete STI screening, including HIV testing. For more information, contact CDC-INFO at 096-965-4296.   When to get medical advice  Call your healthcare provider right away if any of these occur:    You aren't better after 3 days of treatment    Fever of 100.4 F (38 C) or higher, or as directed by your provider    Back or belly pain that gets worse    You can't urinate because of pain    New discharge from the urethra, vagina, or penis    Painful sores on the penis    Rash or joint pain    Painful lumps (lymph nodes) in the groin    Testicle pain or swelling of the scrotum  [employer] last reviewed this educational content on 10/1/2019    4784-9022 [employer]. All rights reserved. This information is not intended as a substitute for professional medical care. Always follow your healthcare professional's instructions.           August 29, 2021 [facility]:     I do not know what is causing your discomfort with urination. Your urine screening test does not suggest infection. I have ordered another urine test (called a urine culture) to check further for possible infection.     You were also screened for yeast infection and nuisance vaginal infection (your results were normal).     At this time, I suspect the new soap that you have been using for the past  week may be causing your symptoms. Please stop using that soap.       Don't use any chemicals or products that you think may be causing your symptoms.    If you were given a prescription medicine, take as directed. Take it until it is all used up.    If a culture was taken, don't have sex until you have been told that it is negative. A negative culture means you don't have an infection. Then follow your healthcare provider's advice to treat your condition.    If your symptoms don't get better in 3 days, or if you develop any of the below, you should see your primary care doctor:         Fever of 100.4 F (38 C) or higher, or as directed by your provider    Back or belly pain that gets worse    You can't urinate because of pain    New discharge from the urethra, vagina

## 2021-10-02 ENCOUNTER — HEALTH MAINTENANCE LETTER (OUTPATIENT)
Age: 51
End: 2021-10-02

## 2021-11-23 ASSESSMENT — ENCOUNTER SYMPTOMS
WEAKNESS: 0
HEARTBURN: 0
CONSTIPATION: 0
NERVOUS/ANXIOUS: 0
JOINT SWELLING: 0
HEADACHES: 0
EYE PAIN: 0
DYSURIA: 0
DIZZINESS: 0
MYALGIAS: 1
FREQUENCY: 0
HEMATOCHEZIA: 0
BREAST MASS: 0
ARTHRALGIAS: 1
PALPITATIONS: 0
DIARRHEA: 0
FEVER: 0
COUGH: 0
CHILLS: 0
ABDOMINAL PAIN: 0
SHORTNESS OF BREATH: 0
PARESTHESIAS: 0
HEMATURIA: 0
NAUSEA: 0
SORE THROAT: 0

## 2021-11-24 ENCOUNTER — OFFICE VISIT (OUTPATIENT)
Dept: PEDIATRICS | Facility: CLINIC | Age: 51
End: 2021-11-24
Payer: COMMERCIAL

## 2021-11-24 VITALS
DIASTOLIC BLOOD PRESSURE: 68 MMHG | TEMPERATURE: 97.3 F | SYSTOLIC BLOOD PRESSURE: 108 MMHG | RESPIRATION RATE: 16 BRPM | HEART RATE: 71 BPM | BODY MASS INDEX: 29.44 KG/M2 | HEIGHT: 62 IN | OXYGEN SATURATION: 99 % | WEIGHT: 160 LBS

## 2021-11-24 DIAGNOSIS — E55.9 VITAMIN D DEFICIENCY: ICD-10-CM

## 2021-11-24 DIAGNOSIS — K64.4 EXTERNAL HEMORRHOIDS: ICD-10-CM

## 2021-11-24 DIAGNOSIS — K29.70 GASTRITIS WITHOUT BLEEDING, UNSPECIFIED CHRONICITY, UNSPECIFIED GASTRITIS TYPE: ICD-10-CM

## 2021-11-24 DIAGNOSIS — Z13.1 SCREENING FOR DIABETES MELLITUS: ICD-10-CM

## 2021-11-24 DIAGNOSIS — Z00.00 ROUTINE GENERAL MEDICAL EXAMINATION AT A HEALTH CARE FACILITY: Primary | ICD-10-CM

## 2021-11-24 DIAGNOSIS — Z13.220 SCREENING CHOLESTEROL LEVEL: ICD-10-CM

## 2021-11-24 LAB
ALBUMIN SERPL-MCNC: 3.6 G/DL (ref 3.4–5)
ALP SERPL-CCNC: 72 U/L (ref 40–150)
ALT SERPL W P-5'-P-CCNC: 26 U/L (ref 0–50)
ANION GAP SERPL CALCULATED.3IONS-SCNC: 5 MMOL/L (ref 3–14)
AST SERPL W P-5'-P-CCNC: 20 U/L (ref 0–45)
BILIRUB SERPL-MCNC: 0.9 MG/DL (ref 0.2–1.3)
BUN SERPL-MCNC: 16 MG/DL (ref 7–30)
CALCIUM SERPL-MCNC: 9.3 MG/DL (ref 8.5–10.1)
CHLORIDE BLD-SCNC: 108 MMOL/L (ref 94–109)
CHOLEST SERPL-MCNC: 211 MG/DL
CO2 SERPL-SCNC: 27 MMOL/L (ref 20–32)
CREAT SERPL-MCNC: 0.63 MG/DL (ref 0.52–1.04)
DEPRECATED CALCIDIOL+CALCIFEROL SERPL-MC: 32 UG/L (ref 20–75)
FASTING STATUS PATIENT QL REPORTED: ABNORMAL
GFR SERPL CREATININE-BSD FRML MDRD: >90 ML/MIN/1.73M2
GLUCOSE BLD-MCNC: 106 MG/DL (ref 70–99)
HDLC SERPL-MCNC: 68 MG/DL
LDLC SERPL CALC-MCNC: 123 MG/DL
NONHDLC SERPL-MCNC: 143 MG/DL
POTASSIUM BLD-SCNC: 3.9 MMOL/L (ref 3.4–5.3)
PROT SERPL-MCNC: 7.5 G/DL (ref 6.8–8.8)
SODIUM SERPL-SCNC: 140 MMOL/L (ref 133–144)
TRIGL SERPL-MCNC: 99 MG/DL

## 2021-11-24 PROCEDURE — 80061 LIPID PANEL: CPT | Performed by: STUDENT IN AN ORGANIZED HEALTH CARE EDUCATION/TRAINING PROGRAM

## 2021-11-24 PROCEDURE — 36415 COLL VENOUS BLD VENIPUNCTURE: CPT | Performed by: STUDENT IN AN ORGANIZED HEALTH CARE EDUCATION/TRAINING PROGRAM

## 2021-11-24 PROCEDURE — 99396 PREV VISIT EST AGE 40-64: CPT | Mod: GC | Performed by: STUDENT IN AN ORGANIZED HEALTH CARE EDUCATION/TRAINING PROGRAM

## 2021-11-24 PROCEDURE — 80053 COMPREHEN METABOLIC PANEL: CPT | Performed by: STUDENT IN AN ORGANIZED HEALTH CARE EDUCATION/TRAINING PROGRAM

## 2021-11-24 PROCEDURE — 82306 VITAMIN D 25 HYDROXY: CPT | Performed by: STUDENT IN AN ORGANIZED HEALTH CARE EDUCATION/TRAINING PROGRAM

## 2021-11-24 RX ORDER — HYDROCORTISONE 25 MG/G
CREAM TOPICAL 2 TIMES DAILY PRN
Qty: 28 G | Refills: 3 | Status: SHIPPED | OUTPATIENT
Start: 2021-11-24 | End: 2023-12-14

## 2021-11-24 RX ORDER — ACETAMINOPHEN 500 MG
500-1000 TABLET ORAL EVERY 6 HOURS PRN
COMMUNITY

## 2021-11-24 RX ORDER — POLYETHYLENE GLYCOL 3350 17 G/17G
15 POWDER, FOR SOLUTION ORAL EVERY 8 HOURS
COMMUNITY
Start: 2021-03-29 | End: 2022-12-08

## 2021-11-24 RX ORDER — POLYETHYLENE GLYCOL 3350 17 G/17G
1 POWDER, FOR SOLUTION ORAL DAILY
COMMUNITY

## 2021-11-24 ASSESSMENT — ENCOUNTER SYMPTOMS
EYE PAIN: 0
JOINT SWELLING: 0
ABDOMINAL PAIN: 0
SORE THROAT: 0
HEMATURIA: 0
FREQUENCY: 0
CHILLS: 0
PARESTHESIAS: 0
ARTHRALGIAS: 1
NERVOUS/ANXIOUS: 0
NAUSEA: 0
BREAST MASS: 0
HEMATOCHEZIA: 0
DYSURIA: 0
SHORTNESS OF BREATH: 0
MYALGIAS: 1
DIARRHEA: 0
DIZZINESS: 0
CONSTIPATION: 0
FEVER: 0
PALPITATIONS: 0
COUGH: 0
WEAKNESS: 0
HEADACHES: 0
HEARTBURN: 0

## 2021-11-24 ASSESSMENT — MIFFLIN-ST. JEOR: SCORE: 1284.01

## 2021-11-24 NOTE — PROGRESS NOTES
SUBJECTIVE:   CC: Mirta Sims is an 51 year old woman who presents for preventive health visit.     {Patient has been advised of split billing requirements and indicates understanding: Yes  Healthy Habits:     Getting at least 3 servings of Calcium per day:  Yes    Bi-annual eye exam:  NO    Dental care twice a year:  Yes    Sleep apnea or symptoms of sleep apnea:  None    Diet:  Regular (no restrictions)    Frequency of exercise:  2-3 days/week    Duration of exercise:  30-45 minutes    Taking medications regularly:  Not Applicable    PHQ-2 Total Score: 0    Additional concerns today:  No    Seen in august for dysuria- has since resolved- on rare occasions worried it will come back.  Thought to be urethritis at that time as there was no evidence of UTI    Last saw GI and they think that the Hep B is cleared and no longer need to follow up with them      had COVID in March and she felt bad during that time, but testing was negative. She has since gotten her COVID booster    She had been less active due to fear in the setting of hearing about violence against asians in the setting of COVID.  She has now started walking again and walks about 3 miles 3 times a weeks.    Had a left arm injury at work and working with workman's comp with a plan to see an orthopedic specialist.  She is currently only working part time due to this injury.    Notes that she has had constipation ever since she had her gallbladder removed- using miralax every day and drinks 5 cups of water a day.  Abut once a week notes difficult to pass stools despite miralax.    Has had some left sided chest pain associated with certain movements for the past 2 weeks. No associated shortness of breath, not related to exertion, but instead certain arm movements.  Improves with tylenol and icy hot.  -------------------------------------    Today's PHQ-2 Score:   PHQ-2 ( 1999 Pfizer) 11/23/2021   Q1: Little interest or pleasure in doing things 0    Q2: Feeling down, depressed or hopeless 0   PHQ-2 Score 0   PHQ-2 Total Score (12-17 Years)- Positive if 3 or more points; Administer PHQ-A if positive -   Q1: Little interest or pleasure in doing things Not at all   Q2: Feeling down, depressed or hopeless Not at all   PHQ-2 Score 0       Abuse: Current or Past (Physical, Sexual or Emotional) - No  Do you feel safe in your environment? Yes    Have you ever done Advance Care Planning? (For example, a Health Directive, POLST, or a discussion with a medical provider or your loved ones about your wishes): No, advance care planning information given to patient to review.  Patient declined advance care planning discussion at this time.    Social History     Tobacco Use     Smoking status: Never Smoker     Smokeless tobacco: Never Used   Substance Use Topics     Alcohol use: Yes     Comment: occasional      If you drink alcohol do you typically have >3 drinks per day or >7 drinks per week? No    Alcohol Use 11/24/2021   Prescreen: >3 drinks/day or >7 drinks/week? -   Prescreen: >3 drinks/day or >7 drinks/week? No       Reviewed orders with patient.  Reviewed health maintenance and updated orders accordingly - Yes      Breast Cancer Screening:  Any new diagnosis of family breast, ovarian, or bowel cancer? No    FHS-7:   Breast CA Risk Assessment (FHS-7) 8/13/2021   Did any of your first-degree relatives have breast or ovarian cancer? No   Did any of your relatives have bilateral breast cancer? No   Did any man in your family have breast cancer? No   Did any woman in your family have breast and ovarian cancer? No   Did any woman in your family have breast cancer before age 50 y? No   Do you have 2 or more relatives with breast and/or ovarian cancer? No   Do you have 2 or more relatives with breast and/or bowel cancer? No       Mammogram Screening: Recommended annual mammography  Pertinent mammograms are reviewed under the imaging tab.    History of abnormal Pap smear: NO -  "age 30-65 PAP every 5 years with negative HPV co-testing recommended  PAP / HPV Latest Ref Rng & Units 2/22/2019 1/29/2016   PAP - - Negative for squamous intraepithelial lesion or malignancy  Electronically signed by Renetta Arcos CT (ASCP) on 2/4/2016 at  2:29 PM     PAP (Historical) - NIL -   HPV16 NEG:Negative Negative -   HPV18 NEG:Negative Negative -   HRHPV NEG:Negative Negative -     Reviewed and updated as needed this visit by clinical staff  Tobacco  Allergies    Med Hx  Surg Hx  Fam Hx  Soc Hx       Reviewed and updated as needed this visit by Provider                   Review of Systems   Constitutional: Negative for chills and fever.   HENT: Negative for congestion, ear pain, hearing loss and sore throat.    Eyes: Negative for pain and visual disturbance.   Respiratory: Negative for cough and shortness of breath.    Cardiovascular: Positive for chest pain. Negative for palpitations and peripheral edema.   Gastrointestinal: Negative for abdominal pain, constipation, diarrhea, heartburn, hematochezia and nausea.   Breasts:  Negative for tenderness, breast mass and discharge.   Genitourinary: Negative for dysuria, frequency, genital sores, hematuria, pelvic pain, urgency, vaginal bleeding and vaginal discharge.   Musculoskeletal: Positive for arthralgias and myalgias. Negative for joint swelling.   Skin: Negative for rash.   Neurological: Negative for dizziness, weakness, headaches and paresthesias.   Psychiatric/Behavioral: Negative for mood changes. The patient is not nervous/anxious.         OBJECTIVE:   /68 (Cuff Size: Adult Regular)   Pulse 71   Temp 97.3  F (36.3  C) (Tympanic)   Resp 16   Ht 1.575 m (5' 2\")   Wt 72.6 kg (160 lb)   LMP 03/20/2018 (Approximate)   SpO2 99%   BMI 29.26 kg/m    Physical Exam  GENERAL: healthy, alert and no distress  EYES: Eyes grossly normal to inspection, PERRL and conjunctivae and sclerae normal  HENT: ear canals and TM's normal, nose and mouth " without ulcers or lesions  NECK: no adenopathy, no asymmetry, masses, or scars and thyroid normal to palpation  RESP: lungs clear to auscultation - no rales, rhonchi or wheezes  CV: regular rate and rhythm, normal S1 S2, no S3 or S4, no murmur, click or rub, no peripheral edema and peripheral pulses strong  ABDOMEN: soft, nontender, no hepatosplenomegaly, no masses and bowel sounds normal  MS: no gross musculoskeletal defects noted, no edema  SKIN: no suspicious lesions or rashes  NEURO: Normal strength and tone, mentation intact and speech normal  PSYCH: mentation appears normal, affect normal/bright    Diagnostic Test Results:  Labs reviewed in Epic  No results found for this or any previous visit (from the past 24 hour(s)).    ASSESSMENT/PLAN:   (Z00.00) Routine general medical examination at a health care facility  (primary encounter diagnosis)  Comment: Overall doing well.  UTD on PAP, colonoscopy and mammogram.  Already received COVID vaccine and booster and flu shot  Plan: Not due for any HM today.      (K29.70) Gastritis without bleeding, unspecified chronicity, unspecified gastritis type  Comment: No current symptoms, avoids ibuprofen and uses prilosec and tums PRN  Plan: omeprazole (PRILOSEC) 20 MG DR capsule    (Z13.1) Screening for diabetes mellitus  Comment: Fasting and will screen with fasting glucose  Plan: Comprehensive metabolic panel (BMP + Alb, Alk         Phos, ALT, AST, Total. Bili, TP)    (Z13.220) Screening cholesterol level  Comment: Last checked in 2018, will check today given that she is fasting  Plan: Lipid panel reflex to direct LDL Fasting3    (K64.4) External hemorrhoids  Comment: notes that they are intermittently bothersome but hydrocortisone cream is helpful.  Manages her constipation with miralax  Plan: hydrocortisone, Perianal, (HYDROCORTISONE) 2.5         % cream    (E55.9) Vitamin D deficiency  Comment: Takes vitamin D daily  Plan: Vitamin D Deficiency      Patient has been  "advised of split billing requirements and indicates understanding: No  COUNSELING:  Reviewed preventive health counseling, as reflected in patient instructions    Estimated body mass index is 29.26 kg/m  as calculated from the following:    Height as of this encounter: 1.575 m (5' 2\").    Weight as of this encounter: 72.6 kg (160 lb).    Weight management plan: Discussed healthy diet and exercise guidelines    She reports that she has never smoked. She has never used smokeless tobacco.      Counseling Resources:  ATP IV Guidelines  Pooled Cohorts Equation Calculator  Breast Cancer Risk Calculator  BRCA-Related Cancer Risk Assessment: FHS-7 Tool  FRAX Risk Assessment  ICSI Preventive Guidelines  Dietary Guidelines for Americans, 2010  USDA's MyPlate  ASA Prophylaxis  Lung CA Screening    Ally Gomez MD  [facility]    Physician Attestation   I, Nayan Holloway MD, saw this patient and agree with the findings and plan of care as documented in the note.      Items personally reviewed/procedural attestation: vitals and labs.    Nayan Holloway MD    "

## 2021-11-24 NOTE — Clinical Note
Please call [facility] and get last clinic note (I do think this was sent as I remember reading it but it did not get scanned in). Thanks! [email or web address]

## 2021-11-26 PROBLEM — R73.01 IMPAIRED FASTING GLUCOSE: Status: ACTIVE | Noted: 2021-11-26

## 2021-11-26 NOTE — PROGRESS NOTES
Left message for release of information ([facility]) requesting last clinic note be faxed.  Provided direct station fax number.     Anabelle Phillips  Lead

## 2021-12-14 NOTE — PROGRESS NOTES
Called [facility] and left detailed message to have them send us patients last office visit.     Rubi Willson, CMA

## 2022-01-03 ENCOUNTER — TRANSFERRED RECORDS (OUTPATIENT)
Dept: HEALTH INFORMATION MANAGEMENT | Facility: CLINIC | Age: 52
End: 2022-01-03
Payer: COMMERCIAL

## 2022-01-19 PROBLEM — G56.02 CARPAL TUNNEL SYNDROME OF LEFT WRIST: Status: ACTIVE | Noted: 2022-01-19

## 2022-03-25 ENCOUNTER — HOSPITAL ENCOUNTER (OUTPATIENT)
Dept: MRI IMAGING | Facility: CLINIC | Age: 52
Discharge: HOME | End: 2022-03-25
Attending: ORTHOPAEDIC SURGERY | Admitting: ORTHOPAEDIC SURGERY
Payer: COMMERCIAL

## 2022-03-25 DIAGNOSIS — R20.0 NUMBNESS AND TINGLING: ICD-10-CM

## 2022-03-25 DIAGNOSIS — R20.2 NUMBNESS AND TINGLING: ICD-10-CM

## 2022-03-25 PROCEDURE — 72141 MRI NECK SPINE W/O DYE: CPT

## 2022-04-18 ENCOUNTER — IMMUNIZATION (OUTPATIENT)
Dept: NURSING | Facility: CLINIC | Age: 52
End: 2022-04-18
Payer: COMMERCIAL

## 2022-04-18 PROCEDURE — 91306 COVID-19,PF,MODERNA (18+ YRS BOOSTER .25ML): CPT

## 2022-04-18 PROCEDURE — 0064A COVID-19,PF,MODERNA (18+ YRS BOOSTER .25ML): CPT

## 2022-09-22 ENCOUNTER — ANCILLARY PROCEDURE (OUTPATIENT)
Dept: MAMMOGRAPHY | Facility: CLINIC | Age: 52
End: 2022-09-22
Attending: INTERNAL MEDICINE
Payer: COMMERCIAL

## 2022-09-22 DIAGNOSIS — Z12.31 VISIT FOR SCREENING MAMMOGRAM: ICD-10-CM

## 2022-09-22 PROCEDURE — 77067 SCR MAMMO BI INCL CAD: CPT | Mod: TC | Performed by: RADIOLOGY

## 2022-10-08 ENCOUNTER — IMMUNIZATION (OUTPATIENT)
Dept: PEDIATRICS | Facility: CLINIC | Age: 52
End: 2022-10-08
Payer: COMMERCIAL

## 2022-10-08 DIAGNOSIS — Z23 NEED FOR PROPHYLACTIC VACCINATION AND INOCULATION AGAINST INFLUENZA: Primary | ICD-10-CM

## 2022-10-08 PROCEDURE — 90471 IMMUNIZATION ADMIN: CPT

## 2022-10-08 PROCEDURE — 99207 PR NO CHARGE NURSE ONLY: CPT

## 2022-10-08 PROCEDURE — 90682 RIV4 VACC RECOMBINANT DNA IM: CPT

## 2022-11-02 ENCOUNTER — IMMUNIZATION (OUTPATIENT)
Dept: PEDIATRICS | Facility: CLINIC | Age: 52
End: 2022-11-02
Payer: COMMERCIAL

## 2022-11-02 DIAGNOSIS — Z23 NEED FOR VACCINATION: Primary | ICD-10-CM

## 2022-11-02 PROCEDURE — 99207 PR NO CHARGE NURSE ONLY: CPT

## 2022-11-02 PROCEDURE — 91313 COVID-19,PF,MODERNA BIVALENT: CPT

## 2022-11-02 PROCEDURE — 0134A COVID-19,PF,MODERNA BIVALENT: CPT

## 2022-12-08 ENCOUNTER — OFFICE VISIT (OUTPATIENT)
Dept: PEDIATRICS | Facility: CLINIC | Age: 52
End: 2022-12-08
Payer: COMMERCIAL

## 2022-12-08 VITALS
RESPIRATION RATE: 14 BRPM | OXYGEN SATURATION: 98 % | TEMPERATURE: 98.1 F | WEIGHT: 162.1 LBS | SYSTOLIC BLOOD PRESSURE: 102 MMHG | HEART RATE: 61 BPM | BODY MASS INDEX: 30.61 KG/M2 | HEIGHT: 61 IN | DIASTOLIC BLOOD PRESSURE: 60 MMHG

## 2022-12-08 DIAGNOSIS — Z00.00 ROUTINE GENERAL MEDICAL EXAMINATION AT A HEALTH CARE FACILITY: Primary | ICD-10-CM

## 2022-12-08 DIAGNOSIS — Z13.1 SCREENING FOR DIABETES MELLITUS: ICD-10-CM

## 2022-12-08 DIAGNOSIS — Z11.3 ROUTINE SCREENING FOR STI (SEXUALLY TRANSMITTED INFECTION): ICD-10-CM

## 2022-12-08 DIAGNOSIS — Z13.220 SCREENING CHOLESTEROL LEVEL: ICD-10-CM

## 2022-12-08 DIAGNOSIS — E55.9 VITAMIN D DEFICIENCY: ICD-10-CM

## 2022-12-08 PROBLEM — Z90.49 S/P CHOLECYSTECTOMY: Status: ACTIVE | Noted: 2022-12-08

## 2022-12-08 LAB
ALBUMIN SERPL BCG-MCNC: 4.3 G/DL (ref 3.5–5.2)
ALP SERPL-CCNC: 75 U/L (ref 35–104)
ALT SERPL W P-5'-P-CCNC: 17 U/L (ref 10–35)
ANION GAP SERPL CALCULATED.3IONS-SCNC: 9 MMOL/L (ref 7–15)
AST SERPL W P-5'-P-CCNC: 25 U/L (ref 10–35)
BILIRUB SERPL-MCNC: 0.6 MG/DL
BUN SERPL-MCNC: 16.3 MG/DL (ref 6–20)
CALCIUM SERPL-MCNC: 9.4 MG/DL (ref 8.6–10)
CHLORIDE SERPL-SCNC: 103 MMOL/L (ref 98–107)
CHOLEST SERPL-MCNC: 191 MG/DL
CREAT SERPL-MCNC: 0.68 MG/DL (ref 0.51–0.95)
DEPRECATED CALCIDIOL+CALCIFEROL SERPL-MC: 37 UG/L (ref 20–75)
DEPRECATED HCO3 PLAS-SCNC: 28 MMOL/L (ref 22–29)
GFR SERPL CREATININE-BSD FRML MDRD: >90 ML/MIN/1.73M2
GLUCOSE SERPL-MCNC: 93 MG/DL (ref 70–99)
HBA1C MFR BLD: 5.5 % (ref 0–5.6)
HDLC SERPL-MCNC: 61 MG/DL
LDLC SERPL CALC-MCNC: 115 MG/DL
NONHDLC SERPL-MCNC: 130 MG/DL
POTASSIUM SERPL-SCNC: 4 MMOL/L (ref 3.4–5.3)
PROT SERPL-MCNC: 7.3 G/DL (ref 6.4–8.3)
SODIUM SERPL-SCNC: 140 MMOL/L (ref 136–145)
T PALLIDUM AB SER QL: NONREACTIVE
TRIGL SERPL-MCNC: 76 MG/DL

## 2022-12-08 PROCEDURE — 82306 VITAMIN D 25 HYDROXY: CPT | Performed by: STUDENT IN AN ORGANIZED HEALTH CARE EDUCATION/TRAINING PROGRAM

## 2022-12-08 PROCEDURE — 80053 COMPREHEN METABOLIC PANEL: CPT | Performed by: STUDENT IN AN ORGANIZED HEALTH CARE EDUCATION/TRAINING PROGRAM

## 2022-12-08 PROCEDURE — 80061 LIPID PANEL: CPT | Performed by: STUDENT IN AN ORGANIZED HEALTH CARE EDUCATION/TRAINING PROGRAM

## 2022-12-08 PROCEDURE — 87591 N.GONORRHOEAE DNA AMP PROB: CPT | Performed by: STUDENT IN AN ORGANIZED HEALTH CARE EDUCATION/TRAINING PROGRAM

## 2022-12-08 PROCEDURE — 87491 CHLMYD TRACH DNA AMP PROBE: CPT | Performed by: STUDENT IN AN ORGANIZED HEALTH CARE EDUCATION/TRAINING PROGRAM

## 2022-12-08 PROCEDURE — 83036 HEMOGLOBIN GLYCOSYLATED A1C: CPT | Performed by: STUDENT IN AN ORGANIZED HEALTH CARE EDUCATION/TRAINING PROGRAM

## 2022-12-08 PROCEDURE — 99396 PREV VISIT EST AGE 40-64: CPT | Performed by: STUDENT IN AN ORGANIZED HEALTH CARE EDUCATION/TRAINING PROGRAM

## 2022-12-08 PROCEDURE — 86780 TREPONEMA PALLIDUM: CPT | Performed by: STUDENT IN AN ORGANIZED HEALTH CARE EDUCATION/TRAINING PROGRAM

## 2022-12-08 PROCEDURE — 36415 COLL VENOUS BLD VENIPUNCTURE: CPT | Performed by: STUDENT IN AN ORGANIZED HEALTH CARE EDUCATION/TRAINING PROGRAM

## 2022-12-08 SDOH — ECONOMIC STABILITY: TRANSPORTATION INSECURITY
IN THE PAST 12 MONTHS, HAS THE LACK OF TRANSPORTATION KEPT YOU FROM MEDICAL APPOINTMENTS OR FROM GETTING MEDICATIONS?: NO

## 2022-12-08 SDOH — HEALTH STABILITY: PHYSICAL HEALTH: ON AVERAGE, HOW MANY MINUTES DO YOU ENGAGE IN EXERCISE AT THIS LEVEL?: 30 MIN

## 2022-12-08 SDOH — ECONOMIC STABILITY: INCOME INSECURITY: IN THE LAST 12 MONTHS, WAS THERE A TIME WHEN YOU WERE NOT ABLE TO PAY THE MORTGAGE OR RENT ON TIME?: NO

## 2022-12-08 SDOH — ECONOMIC STABILITY: TRANSPORTATION INSECURITY
IN THE PAST 12 MONTHS, HAS LACK OF TRANSPORTATION KEPT YOU FROM MEETINGS, WORK, OR FROM GETTING THINGS NEEDED FOR DAILY LIVING?: NO

## 2022-12-08 SDOH — ECONOMIC STABILITY: FOOD INSECURITY: WITHIN THE PAST 12 MONTHS, YOU WORRIED THAT YOUR FOOD WOULD RUN OUT BEFORE YOU GOT MONEY TO BUY MORE.: SOMETIMES TRUE

## 2022-12-08 SDOH — HEALTH STABILITY: PHYSICAL HEALTH: ON AVERAGE, HOW MANY DAYS PER WEEK DO YOU ENGAGE IN MODERATE TO STRENUOUS EXERCISE (LIKE A BRISK WALK)?: 3 DAYS

## 2022-12-08 SDOH — ECONOMIC STABILITY: FOOD INSECURITY: WITHIN THE PAST 12 MONTHS, THE FOOD YOU BOUGHT JUST DIDN'T LAST AND YOU DIDN'T HAVE MONEY TO GET MORE.: SOMETIMES TRUE

## 2022-12-08 SDOH — ECONOMIC STABILITY: INCOME INSECURITY: HOW HARD IS IT FOR YOU TO PAY FOR THE VERY BASICS LIKE FOOD, HOUSING, MEDICAL CARE, AND HEATING?: HARD

## 2022-12-08 ASSESSMENT — LIFESTYLE VARIABLES
HOW OFTEN DO YOU HAVE A DRINK CONTAINING ALCOHOL: NEVER
HOW MANY STANDARD DRINKS CONTAINING ALCOHOL DO YOU HAVE ON A TYPICAL DAY: PATIENT DOES NOT DRINK

## 2022-12-08 ASSESSMENT — ENCOUNTER SYMPTOMS
HEARTBURN: 1
NERVOUS/ANXIOUS: 0
HEMATOCHEZIA: 0
ABDOMINAL PAIN: 0
CHILLS: 0
DYSURIA: 0
DIARRHEA: 0
EYE PAIN: 0
HEADACHES: 0
MYALGIAS: 1
DIZZINESS: 0
HEMATURIA: 0
FREQUENCY: 0
ARTHRALGIAS: 1
FEVER: 0
PARESTHESIAS: 0
NAUSEA: 0
SORE THROAT: 0
BREAST MASS: 0
COUGH: 0
CONSTIPATION: 0
SHORTNESS OF BREATH: 0
WEAKNESS: 0
PALPITATIONS: 0

## 2022-12-08 ASSESSMENT — SOCIAL DETERMINANTS OF HEALTH (SDOH)
HOW OFTEN DO YOU GET TOGETHER WITH FRIENDS OR RELATIVES?: NEVER
IN A TYPICAL WEEK, HOW MANY TIMES DO YOU TALK ON THE PHONE WITH FAMILY, FRIENDS, OR NEIGHBORS?: THREE TIMES A WEEK
DO YOU BELONG TO ANY CLUBS OR ORGANIZATIONS SUCH AS CHURCH GROUPS UNIONS, FRATERNAL OR ATHLETIC GROUPS, OR SCHOOL GROUPS?: NO
HOW OFTEN DO YOU ATTEND CHURCH OR RELIGIOUS SERVICES?: 1 TO 4 TIMES PER YEAR

## 2022-12-08 NOTE — PROGRESS NOTES
SUBJECTIVE:   CC: Mirta is an 52 year old who presents for preventive health visit.     Patient has been advised of split billing requirements and indicates understanding: Yes     Healthy Habits:     Getting at least 3 servings of Calcium per day:  Yes    Bi-annual eye exam:  Yes    Dental care twice a year:  Yes    Sleep apnea or symptoms of sleep apnea:  None    Diet:  Regular (no restrictions)    Frequency of exercise:  2-3 days/week    Duration of exercise:  30-45 minutes    Taking medications regularly:  Yes    Medication side effects:  None    PHQ-2 Total Score: 0    Additional concerns today:  No      3 kids  5 grandchildren  Retired due to left arm pain    Reports that [facility] said do not need future follow up  -was getting ultrasound and labs through them which were normal    Mother  of COVID19 in    has gotten COVID19 mild symptoms; patient has never had it      Today's PHQ-2 Score:   PHQ-2 (  Pfizer) 2022   Q1: Little interest or pleasure in doing things 0   Q2: Feeling down, depressed or hopeless 0   PHQ-2 Score 0   PHQ-2 Total Score (12-17 Years)- Positive if 3 or more points; Administer PHQ-A if positive -   Q1: Little interest or pleasure in doing things Not at all   Q2: Feeling down, depressed or hopeless Not at all   PHQ-2 Score 0       Social History     Tobacco Use     Smoking status: Never     Smokeless tobacco: Never   Substance Use Topics     Alcohol use: Yes     Comment: occasional          Alcohol Use 2022   Prescreen: >3 drinks/day or >7 drinks/week? Not Applicable   Prescreen: >3 drinks/day or >7 drinks/week? -       Reviewed orders with patient.  Reviewed health maintenance and updated orders accordingly - Yes    Breast Cancer Screening:    Breast CA Risk Assessment (FHS-7) 2022   Do you have a family history of breast, colon, or ovarian cancer? No / Unknown       Mammogram Screening: Recommended annual mammography  Pertinent  "mammograms are reviewed under the imaging tab.    History of abnormal Pap smear: NO - age 30-65 PAP every 5 years with negative HPV co-testing recommended  PAP / HPV Latest Ref Rng & Units 2/22/2019 1/29/2016   PAP - - Negative for squamous intraepithelial lesion or malignancy  Electronically signed by Renetta Arcos CT (ASCP) on 2/4/2016 at  2:29 PM     PAP (Historical) - NIL -   HPV16 NEG:Negative Negative -   HPV18 NEG:Negative Negative -   HRHPV NEG:Negative Negative -     Reviewed and updated as needed this visit by clinical staff   Tobacco  Allergies  Meds            Tri Rose MD on 12/8/2022 at 9:05 AM    Reviewed and updated as needed this visit by Provider                   Review of Systems   Constitutional: Negative for chills and fever.   HENT: Negative for congestion, ear pain, hearing loss and sore throat.    Eyes: Negative for pain and visual disturbance.   Respiratory: Negative for cough and shortness of breath.    Cardiovascular: Negative for chest pain and palpitations.   Gastrointestinal: Positive for heartburn. Negative for abdominal pain, constipation, diarrhea, hematochezia and nausea.   Breasts:  Negative for tenderness, breast mass and discharge.   Genitourinary: Negative for dysuria, frequency, genital sores, hematuria, urgency and vaginal discharge.   Musculoskeletal: Positive for arthralgias and myalgias.   Skin: Negative for rash.   Neurological: Negative for dizziness, weakness, headaches and paresthesias.   Psychiatric/Behavioral: Negative for mood changes. The patient is not nervous/anxious.      OBJECTIVE:   /60 (BP Location: Right arm, Patient Position: Sitting, Cuff Size: Adult Regular)   Pulse 61   Temp 98.1  F (36.7  C) (Temporal)   Resp 14   Ht 1.549 m (5' 1\")   Wt 73.5 kg (162 lb 1.6 oz)   LMP 03/20/2018 (Approximate)   SpO2 98%   BMI 30.63 kg/m    Physical Exam  GENERAL: healthy, alert and no distress  EYES: Eyes grossly normal to inspection, and " conjunctivae and sclerae normal  HENT: ear canals and TM's normal  NECK: no adenopathy, no asymmetry, masses, or scars and thyroid normal to palpation  RESP: lungs clear to auscultation - no rales, rhonchi or wheezes  CV: regular rate and rhythm, normal S1 S2, no S3 or S4, no murmur, click or rub, no peripheral edema and peripheral pulses strong  ABDOMEN: soft, nontender, no hepatosplenomegaly, no masses   MS: no gross musculoskeletal defects noted, no edema  SKIN: no suspicious lesions or rashes  NEURO: Normal strength and tone, mentation intact and speech normal  PSYCH: mentation appears normal, affect normal/bright    ASSESSMENT/PLAN:       ICD-10-CM    1. Routine general medical examination at a health care facility  Z00.00       2. Screening for diabetes mellitus  Z13.1 COMPREHENSIVE METABOLIC PANEL     Hemoglobin A1c      3. Screening cholesterol level  Z13.220 Lipid panel reflex to direct LDL Fasting      4. Vitamin D deficiency  E55.9 Vitamin D Deficiency      5. Routine screening for STI (sexually transmitted infection)  Z11.3 NEISSERIA GONORRHOEA PCR     CHLAMYDIA TRACHOMATIS PCR     Treponema Abs w Reflex to RPR and Titer            COUNSELING:  Reviewed preventive health counseling, as reflected in patient instructions    She reports that she has never smoked. She has never used smokeless tobacco.        Tri Rose MD  [facility]

## 2022-12-08 NOTE — PATIENT INSTRUCTIONS
Recommend appt with Dr. Conrad for evaluation.  It could just be an odd month but it would be best to check that there are no other concerns since this has not happened before   So nice to meet you!    Voltaren (diclofenac) gel for the hands  -orange tube in the aisle with Advil and Tylenol    Preventive Health Recommendations  Female Ages 50 - 64    Yearly exam: See your health care provider every year in order to  Review health changes.   Discuss preventive care.    Review your medicines if your doctor has prescribed any.    Get a Pap test every three years (unless you have an abnormal result and your provider advises testing more often).  If you get Pap tests with HPV test, you only need to test every 5 years, unless you have an abnormal result.   You do not need a Pap test if your uterus was removed (hysterectomy) and you have not had cancer.  You should be tested each year for STDs (sexually transmitted diseases) if you're at risk.   Have a mammogram every 1 to 2 years.  Have a colonoscopy at age 50, or have a yearly FIT test (stool test). These exams screen for colon cancer.    Have a cholesterol test every 5 years, or more often if advised.  Have a diabetes test (fasting glucose) every three years. If you are at risk for diabetes, you should have this test more often.   If you are at risk for osteoporosis (brittle bone disease), think about having a bone density scan (DEXA).    Shots: Get a flu shot each year. Get a tetanus shot every 10 years.    Nutrition:   Eat at least 5 servings of fruits and vegetables each day.  Eat whole-grain bread, whole-wheat pasta and brown rice instead of white grains and rice.  Get adequate Calcium and Vitamin D.     Lifestyle  Exercise at least 150 minutes a week (30 minutes a day, 5 days a week). This will help you control your weight and prevent disease.  Limit alcohol to one drink per day.  No smoking.   Wear sunscreen to prevent skin cancer.   See your dentist every six months for an exam and cleaning.  See your eye doctor every 1 to 2 years.

## 2022-12-09 LAB
C TRACH DNA SPEC QL NAA+PROBE: NEGATIVE
N GONORRHOEA DNA SPEC QL NAA+PROBE: NEGATIVE

## 2023-01-20 LAB
ALT SERPL-CCNC: 18 U/L (ref 0–32)
AST SERPL-CCNC: 24 IU/L (ref 0–40)

## 2023-01-23 ENCOUNTER — TRANSFERRED RECORDS (OUTPATIENT)
Dept: HEALTH INFORMATION MANAGEMENT | Facility: CLINIC | Age: 53
End: 2023-01-23
Payer: COMMERCIAL

## 2023-04-28 ENCOUNTER — OFFICE VISIT (OUTPATIENT)
Dept: URGENT CARE | Facility: URGENT CARE | Age: 53
End: 2023-04-28
Payer: COMMERCIAL

## 2023-04-28 VITALS
BODY MASS INDEX: 30.53 KG/M2 | HEART RATE: 60 BPM | TEMPERATURE: 97.3 F | OXYGEN SATURATION: 98 % | RESPIRATION RATE: 17 BRPM | DIASTOLIC BLOOD PRESSURE: 84 MMHG | SYSTOLIC BLOOD PRESSURE: 127 MMHG | WEIGHT: 161.6 LBS

## 2023-04-28 DIAGNOSIS — J01.90 ACUTE NON-RECURRENT SINUSITIS, UNSPECIFIED LOCATION: Primary | ICD-10-CM

## 2023-04-28 LAB
DEPRECATED S PYO AG THROAT QL EIA: NEGATIVE
FLUAV AG SPEC QL IA: NEGATIVE
FLUBV AG SPEC QL IA: NEGATIVE

## 2023-04-28 PROCEDURE — 99214 OFFICE O/P EST MOD 30 MIN: CPT | Mod: CS | Performed by: FAMILY MEDICINE

## 2023-04-28 PROCEDURE — 87651 STREP A DNA AMP PROBE: CPT | Performed by: FAMILY MEDICINE

## 2023-04-28 PROCEDURE — 87804 INFLUENZA ASSAY W/OPTIC: CPT | Performed by: FAMILY MEDICINE

## 2023-04-28 PROCEDURE — U0005 INFEC AGEN DETEC AMPLI PROBE: HCPCS | Performed by: FAMILY MEDICINE

## 2023-04-28 PROCEDURE — U0003 INFECTIOUS AGENT DETECTION BY NUCLEIC ACID (DNA OR RNA); SEVERE ACUTE RESPIRATORY SYNDROME CORONAVIRUS 2 (SARS-COV-2) (CORONAVIRUS DISEASE [COVID-19]), AMPLIFIED PROBE TECHNIQUE, MAKING USE OF HIGH THROUGHPUT TECHNOLOGIES AS DESCRIBED BY CMS-2020-01-R: HCPCS | Performed by: FAMILY MEDICINE

## 2023-04-28 RX ORDER — BENZONATATE 100 MG/1
100-200 CAPSULE ORAL 3 TIMES DAILY PRN
Qty: 40 CAPSULE | Refills: 0 | Status: SHIPPED | OUTPATIENT
Start: 2023-04-28 | End: 2023-11-08

## 2023-04-28 NOTE — PROGRESS NOTES
ICD-10-CM    1. Acute non-recurrent sinusitis, unspecified location  J01.90 Streptococcus A Rapid Screen w/Reflex to PCR - Clinic Collect     Influenza A & B Antigen - Clinic Collect     Symptomatic COVID-19 Virus (Coronavirus) by PCR Nose     amoxicillin-clavulanate (AUGMENTIN) 875-125 MG tablet     benzonatate (TESSALON) 100 MG capsule      possibly bacterial given duration. Use of OTC  meds. Discussed.  Symptomatic therapy and follow up discussed   -------------------------------  Mirta Sims with presents with 14 days symptoms including sore throat, congestion, post nasal drip, sinus pressure, productive cough. symptoms have waxed and waned over the 2 weeks. No trouble breathing but painful cough. No fever.     Treatment measures tried include Decongestants and OTC Cough med.  Exposures--none  Recent travel--[place]    Current Outpatient Medications   Medication Sig Dispense Refill     acetaminophen (TYLENOL) 500 MG tablet Take 500-1,000 mg by mouth every 6 hours as needed       calcium carbonate (TUMS) 500 MG chewable tablet Take 1 tablet by mouth       hydrocortisone, Perianal, (HYDROCORTISONE) 2.5 % cream Place rectally 2 times daily as needed for hemorrhoids 28 g 3     loratadine (CLARITIN) 10 MG tablet Take 10 mg by mouth daily as needed for allergies       omeprazole (PRILOSEC) 20 MG DR capsule Take 1 capsule (20 mg) by mouth daily as needed (Reflux) 90 capsule 3     polyethylene glycol (MIRALAX) 17 g packet Take 1 packet by mouth daily       Vitamin D, Cholecalciferol, 25 MCG (1000 UT) TABS Take 1,000 Int'l Units by mouth         ROS otherwise negative for resp., ID,  HEENT symptoms.    Objective: /84   Pulse 60   Temp 97.3  F (36.3  C)   Resp 17   Wt 73.3 kg (161 lb 9.6 oz)   LMP 03/20/2018 (Approximate)   SpO2 98%   BMI 30.53 kg/m    Exam:  GENERAL APPEARANCE: healthy, alert and no distress  EYES: Eyes grossly normal to inspection  HENT: ear canals and TM's normal, nose and mouth  without ulcers or lesions and maxillary sinus tenderness bilateral  NECK: no adenopathy, no asymmetry, masses, or scars and thyroid normal to palpation  RESP: lungs clear to auscultation - no rales, rhonchi or wheezes  CV: regular rates and rhythm, no murmur

## 2023-04-28 NOTE — PATIENT INSTRUCTIONS
Ibuprofen and /or tylenol for achiness and pain with coughing.     Pseudoephedrine, guaifenesin, afrin (oxymetolazone-max 3 days) and hot steamy beverages and soups and showers for congestion.    Tessalon for the cough.

## 2023-04-29 LAB — GROUP A STREP BY PCR: NOT DETECTED

## 2023-04-30 LAB — SARS-COV-2 RNA RESP QL NAA+PROBE: NEGATIVE

## 2023-08-07 ENCOUNTER — TELEPHONE (OUTPATIENT)
Dept: PEDIATRICS | Facility: CLINIC | Age: 53
End: 2023-08-07
Payer: COMMERCIAL

## 2023-08-07 NOTE — TELEPHONE ENCOUNTER
Pt calls, informs wants to know when last time was seen for heavy periods, informed 4/5/19 as had ultrasound done for issue  Dona Cuellar, RN, BSN  [facility]

## 2023-08-09 ENCOUNTER — OFFICE VISIT (OUTPATIENT)
Dept: URGENT CARE | Facility: URGENT CARE | Age: 53
End: 2023-08-09
Payer: COMMERCIAL

## 2023-08-09 VITALS
DIASTOLIC BLOOD PRESSURE: 75 MMHG | SYSTOLIC BLOOD PRESSURE: 116 MMHG | TEMPERATURE: 98.2 F | HEART RATE: 79 BPM | OXYGEN SATURATION: 98 %

## 2023-08-09 DIAGNOSIS — N95.0 POST-MENOPAUSAL BLEEDING: Primary | ICD-10-CM

## 2023-08-09 LAB
ALBUMIN UR-MCNC: NEGATIVE MG/DL
APPEARANCE UR: CLEAR
BACTERIA #/AREA URNS HPF: ABNORMAL /HPF
BILIRUB UR QL STRIP: NEGATIVE
COLOR UR AUTO: YELLOW
ERYTHROCYTE [DISTWIDTH] IN BLOOD BY AUTOMATED COUNT: 12.6 % (ref 10–15)
GLUCOSE UR STRIP-MCNC: NEGATIVE MG/DL
HCT VFR BLD AUTO: 45.9 % (ref 35–47)
HGB BLD-MCNC: 14.8 G/DL (ref 11.7–15.7)
HGB UR QL STRIP: ABNORMAL
KETONES UR STRIP-MCNC: NEGATIVE MG/DL
LEUKOCYTE ESTERASE UR QL STRIP: ABNORMAL
MCH RBC QN AUTO: 29.6 PG (ref 26.5–33)
MCHC RBC AUTO-ENTMCNC: 32.2 G/DL (ref 31.5–36.5)
MCV RBC AUTO: 92 FL (ref 78–100)
NITRATE UR QL: NEGATIVE
PH UR STRIP: 6.5 [PH] (ref 5–7)
PLATELET # BLD AUTO: 182 10E3/UL (ref 150–450)
RBC # BLD AUTO: 5 10E6/UL (ref 3.8–5.2)
RBC #/AREA URNS AUTO: ABNORMAL /HPF
SP GR UR STRIP: <=1.005 (ref 1–1.03)
SQUAMOUS #/AREA URNS AUTO: ABNORMAL /LPF
UROBILINOGEN UR STRIP-ACNC: 0.2 E.U./DL
WBC # BLD AUTO: 6.8 10E3/UL (ref 4–11)
WBC #/AREA URNS AUTO: ABNORMAL /HPF

## 2023-08-09 PROCEDURE — 36415 COLL VENOUS BLD VENIPUNCTURE: CPT | Performed by: PHYSICIAN ASSISTANT

## 2023-08-09 PROCEDURE — 81001 URINALYSIS AUTO W/SCOPE: CPT | Performed by: PHYSICIAN ASSISTANT

## 2023-08-09 PROCEDURE — 85027 COMPLETE CBC AUTOMATED: CPT | Performed by: PHYSICIAN ASSISTANT

## 2023-08-09 PROCEDURE — 99214 OFFICE O/P EST MOD 30 MIN: CPT | Performed by: PHYSICIAN ASSISTANT

## 2023-08-09 NOTE — PROGRESS NOTES
Assessment & Plan     1. Post-menopausal bleeding    - UA Macroscopic with reflex to Microscopic and Culture  - UA Microscopic with Reflex to Culture  - Ob/Gyn Referral; Future  - CBC with platelets; Future  - CBC with platelets    Patient presents to the clinic today for evaluation of postmenopausal bleeding.  Her last menses was approximately 4 years ago.  On examination she does not have abdominal tenderness or flank pain.  Urinalysis is negative for infection.  CBC is without leukocytosis and here hemoglobin is stable.  Past ultrasound was reviewed from 2019, which showed uterine fibroids, perhaps the source of her bleeding, although would like her to follow-up with OB, and a referral was placed for ultrasound, endometrial biopsy etc.    Return in about 1 week (around 8/16/2023) for OB.    Diagnosis and treatment plan was reviewed with patient and/or family.   We went over any labs or imaging. Discussed worsening symptoms or little to no relief despite treatment plan to follow-up with PCP or return to clinic.  Patient verbalizes understanding. All questions were addressed and answered.     Merly Mendoza PA-C  [facility]    CHIEF COMPLAINT:   Chief Complaint   Patient presents with    Urgent Care     Uti concerns x 3 weeks ago, had breast tenderness a few weeks ago- vaginal bleeding x 5 days     Subjective     Mirta is a 53 year old female who presents to clinic today for evaluation of vaginal bleeding. Started 5 days ago. No abdominal pain. She did have breast tenderness several weeks ago, which has now resolved.  Bleeding was initially very heavy, and now has tapered off and she has been using a light pad.  She has not had abdominal pain nausea or vomiting.      Last period was 04/2019.  Denies having fever, chills, flank pain, nausea, vomiting, hematuria or weakness. Vaginal discharge, itching or pain are not present. No new sexual partners. No weight loss.     Past Medical  History:   Diagnosis Date    Chronic hepatitis B (H)     Likely vertical transmission, follows with [facility]. Low viral low, no tx. Monitoring for HCC to start at age 48.    Gastritis 2011    endoscopy 1/7/11 showing gastritis    Iron deficiency anemia 2015    Patient has iron deficiency anemia. Has seen hematology. Anemia is most likely due to heavy menstrual bleeding. Hematology recommended oral iron 3 times daily for 8 weeks. If she is responding, then she can continue on the oral iron therapy. The hematologist recommended 3 months of replacement therapy. She finished her iron supplement, which she was taking twice daily, and is not taking any supple    Perforated appendicitis     Post-operative wound abscess      Past Surgical History:   Procedure Laterality Date    APPENDECTOMY  12/26/2016    APPENDECTOMY      CHOLECYSTECTOMY      CHOLECYSTECTOMY      HC HYSTEROS W PERMANENT FALLOPAIN IMPLANT      LAPAROSCOPIC APPENDECTOMY N/A 4/3/2018    Procedure: LAPAROSCOPIC APPENDECTOMY;  LAPAROSCOPIC APPENDECTOMY   ;  Surgeon: Clarence Wayne MD;  Location:  OR    LAPAROTOMY EXPLORATORY  01/22/2017    Procedure: LAPAROSCOPY CONVERTED TO EXPLORATORY LAPAROTOMY WITH EVACUATION OF RETROCOLONIC ABSCESS X 2; Surgeon: Johnnie Dinero DO; Location: [facility] [place]; Service: General    MESH (IMPLANTABLE)  01/22/2017    Film Anti Adhesion Sepra 4301-02 - Sn.A. Abdomen    ND LAP,APPENDECTOMY N/A 12/26/2016    Procedure: APPENDECTOMY, LAPAROSCOPIC;  Surgeon: Johnnie Dinero DO;  Location: [facility];  Service: General    ND LAP,DIAGNOSTIC ABDOMEN N/A 1/22/2017    Procedure: LAPAROSCOPY CONVERTED TO EXPLORATORY LAPAROTOMY WITH EVACUATION OF RETROCOLONIC ABSCESS X 2;  Surgeon: Johnnie Dinero DO;  Location: [facility] [place];  Service: General    ND OCCLUDE FALLOPIAN TUBE BY DEVICE      Description: Oviductal Surgery Tubal Occlusion By Device;  Recorded: 04/13/2012;    UPPER GASTROINTESTINAL ENDOSCOPY       Social  History     Tobacco Use    Smoking status: Never    Smokeless tobacco: Never   Substance Use Topics    Alcohol use: Yes     Comment: occasional      Current Outpatient Medications   Medication    calcium carbonate (TUMS) 500 MG chewable tablet    hydrocortisone, Perianal, (HYDROCORTISONE) 2.5 % cream    loratadine (CLARITIN) 10 MG tablet    omeprazole (PRILOSEC) 20 MG DR capsule    polyethylene glycol (MIRALAX) 17 g packet    Vitamin D, Cholecalciferol, 25 MCG (1000 UT) TABS    acetaminophen (TYLENOL) 500 MG tablet    benzonatate (TESSALON) 100 MG capsule     No current facility-administered medications for this visit.     Allergies   Allergen Reactions    Strawberry Extract Swelling    Tomato Swelling    Azithromycin Rash     Annotation: on face         10 point ROS of systems were all negative except for pertinent positives noted in my HPI.      Exam:   /75   Pulse 79   Temp 98.2  F (36.8  C) (Tympanic)   LMP 03/20/2018 (Approximate)   SpO2 98%   Constitutional: healthy, alert and no distress  ENT: MMM  Cardiovascular: RRR  Respiratory: CTA bilaterally, no rhonchi or rales  Gastrointestinal: soft and nontender  Back: No CVA tenderness B/L  Skin: no rashes      Results for orders placed or performed in visit on 08/09/23   UA Macroscopic with reflex to Microscopic and Culture     Status: Abnormal    Specimen: Urine, Clean Catch   Result Value Ref Range    Color Urine Yellow Colorless, Straw, Light Yellow, Yellow    Appearance Urine Clear Clear    Glucose Urine Negative Negative mg/dL    Bilirubin Urine Negative Negative    Ketones Urine Negative Negative mg/dL    Specific Gravity Urine <=1.005 1.003 - 1.035    Blood Urine Trace (A) Negative    pH Urine 6.5 5.0 - 7.0    Protein Albumin Urine Negative Negative mg/dL    Urobilinogen Urine 0.2 0.2, 1.0 E.U./dL    Nitrite Urine Negative Negative    Leukocyte Esterase Urine Trace (A) Negative   UA Microscopic with Reflex to Culture     Status: Abnormal   Result  Value Ref Range    Bacteria Urine Few (A) None Seen /HPF    RBC Urine 0-2 0-2 /HPF /HPF    WBC Urine 0-5 0-5 /HPF /HPF    Squamous Epithelials Urine Few (A) None Seen /LPF    Narrative    Urine Culture not indicated   CBC with platelets     Status: Normal   Result Value Ref Range    WBC Count 6.8 4.0 - 11.0 10e3/uL    RBC Count 5.00 3.80 - 5.20 10e6/uL    Hemoglobin 14.8 11.7 - 15.7 g/dL    Hematocrit 45.9 35.0 - 47.0 %    MCV 92 78 - 100 fL    MCH 29.6 26.5 - 33.0 pg    MCHC 32.2 31.5 - 36.5 g/dL    RDW 12.6 10.0 - 15.0 %    Platelet Count 182 150 - 450 10e3/uL

## 2023-09-19 ENCOUNTER — OFFICE VISIT (OUTPATIENT)
Dept: OBGYN | Facility: CLINIC | Age: 53
End: 2023-09-19
Payer: COMMERCIAL

## 2023-09-19 VITALS
DIASTOLIC BLOOD PRESSURE: 60 MMHG | BODY MASS INDEX: 30.58 KG/M2 | WEIGHT: 162 LBS | SYSTOLIC BLOOD PRESSURE: 102 MMHG | HEIGHT: 61 IN

## 2023-09-19 DIAGNOSIS — D25.1 INTRAMURAL LEIOMYOMA OF UTERUS: ICD-10-CM

## 2023-09-19 DIAGNOSIS — N95.0 PMB (POSTMENOPAUSAL BLEEDING): Primary | ICD-10-CM

## 2023-09-19 DIAGNOSIS — Z12.4 SCREENING FOR MALIGNANT NEOPLASM OF CERVIX: ICD-10-CM

## 2023-09-19 PROCEDURE — G0145 SCR C/V CYTO,THINLAYER,RESCR: HCPCS | Performed by: OBSTETRICS & GYNECOLOGY

## 2023-09-19 PROCEDURE — 87624 HPV HI-RISK TYP POOLED RSLT: CPT | Performed by: OBSTETRICS & GYNECOLOGY

## 2023-09-19 PROCEDURE — 58100 BIOPSY OF UTERUS LINING: CPT | Performed by: OBSTETRICS & GYNECOLOGY

## 2023-09-19 PROCEDURE — 99204 OFFICE O/P NEW MOD 45 MIN: CPT | Mod: 25 | Performed by: OBSTETRICS & GYNECOLOGY

## 2023-09-19 PROCEDURE — 88305 TISSUE EXAM BY PATHOLOGIST: CPT | Performed by: PATHOLOGY

## 2023-09-19 NOTE — PROGRESS NOTES
"  Assessment & Plan     PMB (postmenopausal bleeding)  - Need to evaluate for endometrial hyperplasia or CA.  Also possible causes include endometrial atrophy, polyp, or even an isolated ovulatory w/d bleed  - ENDOMETRIAL BIOPSY W/O CERVICAL DILATION done today  - Surgical Pathology Exam  - US Pelvic Complete with Transvaginal; Future to check endometrial stripe.  - If EMBx is neg, and U/S shows stripe < 4 mm thick, then can monitor sx's expectantly.  If EMBx is neg, and U/S shows strips 4 mm or more, then will schedule Op Hyst w/ IUM.  - If EMBx shows non-atypical hyperplasia, then will consider Mirena IUD.  - If EMBx shows atypical hyperplasia or CA, then refer to Gyn Onc.    Intramural leiomyoma of uterus  - Need to confirm stability  - US Pelvic Complete with Transvaginal; Future    Screening for malignant neoplasm of cervix  - Due for screening  - Pap imaged thin layer screen with HPV - recommended age 30 - 65 years (select HPV order below)    Review of the result(s) of each unique test - Pelvic U/S 4/5/2019  showed 10 mm stripe and small myoma, Pap WNL, HPV Neg 2/22/2019         BMI:   Estimated body mass index is 30.61 kg/m  as calculated from the following:    Height as of this encounter: 1.549 m (5' 1\").    Weight as of this encounter: 73.5 kg (162 lb).           No follow-ups on file.    Shane Saldivar MD  [facility] Kirby is a 53 year old, presenting for the following health issues:  Postmenopausal bleeding      Pt here for PMBx.  Pt went through menopause approx 4/2019 when she had her last menses.  She has not been on any HRT since.  No major hot flashes.  On 8/4/2023 she had 5 days of red VB - spotting.  It was preceded by a few days of breast tenderness similar to PMS sx's she had when she was ovulatory.  She had no cramps.  No abnl discharge.  She last had a Pap/HPV co-test (WNL) 2/2019, and a pelvic U/S 4/5/2019.                  Review of Systems       " "  Objective    /60 (BP Location: Right arm, Patient Position: Sitting, Cuff Size: Adult Regular)   Ht 1.549 m (5' 1\")   Wt 73.5 kg (162 lb)   LMP 03/20/2018 (Approximate)   BMI 30.61 kg/m    Body mass index is 30.61 kg/m .  Physical Exam  Constitutional:       General: She is not in acute distress.     Appearance: Normal appearance. She is normal weight. She is not ill-appearing.   Neurological:      Mental Status: She is alert.     Abdomen- Abdomen soft, non-tender. BS normal. No masses, organomegaly, positive findings: well-healed midline vertical scar  Pelvic- Exam chaperoned by nurse, External genitalia normal, Bartholin's glands normal, Hallett's glands normal, Urethral meatus normal, Urethra normal, Bladder normal, Vagina with normal rugae, no abnormal lesions, no abnormal discharge, Normal cervix without lesions or mucopus, no cervical motion tenderness, Uterus normal size, shape, and contour, no masses, non-tender, Adnexa normal size without masses or tenderness bilaterally, Anus normal, Pap smear was Done,  HPV Done; EMBx performed as noted below.        Pelvic U/S 4/5/2019:  ULTRASOUND PELVIS WITH TRANSVAGINAL IMAGING   4/5/2019 2:35 PM      HISTORY: Menorrhagia.     COMPARISON: 4/3/2018 - CT abdomen and pelvis.     TECHNIQUE: Endovaginal imaging was also performed to better evaluate  the uterus.     FINDINGS: The uterus is retroflexed, measuring 7.9 x 5.8 x 5.5 cm in  long axis, short axis and transverse dimensions respectively. An  ill-defined 1.3 x 1.1 x 1.0 cm heterogeneous hypoechoic structure is  present in the anterior uterine body, most likely an intramural  fibroid. The endometrial stripe is mildly heterogeneous, measuring 1.0  cm in thickness. The right and left ovaries are unremarkable,  measuring 3.2 x 2.0 x 1.3 cm and 2.2 x 1.6 x 1.2 cm respectively. No  adnexal masses. A very small amount of simple-appearing free fluid in  the left hemipelvis.                                           "                            IMPRESSION:   1. 1.3 cm intramural uterine fibroid.  2. Otherwise, unremarkable appearance of the uterus and ovaries. The  endometrial stripe is mildly heterogeneous, measuring 1.0 cm in  thickness.  3. A very small amount of nonspecific free fluid in the pelvis.       PROCEDURE:  Endometrial Biopsy      Indications for procedure:  Evaluation of PMB    Pre-Procedure Medications:  None    The proposed procedure to diagnose the above condition was explained to the patient.  Risks, side effects, benefits, and alternatives were discussed. All questions were answered to patient's satisfaction. The patient gave informed consent.       The patient was placed in the dorsal lithotomy position and the perineum was exposed.  External genitalia appeared normal.  The uterus was mobile with normal size, shape, and consistency, without tenderness.  The adnexae palpated normally on bimanual exam without mass or tenderness. The uterus was anteverted  Appropriate sterile technique was used throughout the procedure.  A speculum was inserted and the cervix was visualized.  The cervix was cleansed with antiseptic solution.  A tenaculum was applied to the anterior lip of the cervix.  The uterus was sounded to 7 cm.  The [place] Endometrial Suction Curette was used and 1 rotating pass(s) were made between the fundus and the internal os. An adequate sample was obtained and sent to pathology.  Instruments were removed.  The patient experienced mild cramping during the procedure.  This subsided rapidly after instruments were removed.  The patient remained supine for a few moments after the procedure and had no other complications. No heavy bleeding was observed.       The patient was instructed to report any fever, cramping after 48 hours, or bleeding for 24-48 hours heavier than normal menses. OTC NSAIDs may be used for cramping PRN. Sexual relations may be resumed in 2-3 days, or after bleeding has stopped.   Pt.  is to contact our office if she has not received the pathology report within 1-2 weeks of the procedure.

## 2023-09-19 NOTE — NURSING NOTE
"Chief Complaint   Patient presents with    Postmenopausal bleeding       Initial /60 (BP Location: Right arm, Patient Position: Sitting, Cuff Size: Adult Regular)   Ht 1.549 m (5' 1\")   Wt 73.5 kg (162 lb)   LMP 2018 (Approximate)   BMI 30.61 kg/m   Estimated body mass index is 30.61 kg/m  as calculated from the following:    Height as of this encounter: 1.549 m (5' 1\").    Weight as of this encounter: 73.5 kg (162 lb).  BP completed using cuff size: regular    Questioned patient about current smoking habits.  Pt. has never smoked.          The following HM Due: NONE    Chet Jj CMA                "

## 2023-09-22 LAB
BKR LAB AP GYN ADEQUACY: NORMAL
BKR LAB AP GYN INTERPRETATION: NORMAL
BKR LAB AP HPV REFLEX: NORMAL
BKR LAB AP PREVIOUS ABNORMAL: NORMAL
PATH REPORT.COMMENTS IMP SPEC: NORMAL
PATH REPORT.FINAL DX SPEC: NORMAL
PATH REPORT.GROSS SPEC: NORMAL
PATH REPORT.MICROSCOPIC SPEC OTHER STN: NORMAL
PATH REPORT.RELEVANT HX SPEC: NORMAL
PATH REPORT.RELEVANT HX SPEC: NORMAL
PHOTO IMAGE: NORMAL

## 2023-09-25 LAB
HUMAN PAPILLOMA VIRUS 16 DNA: NEGATIVE
HUMAN PAPILLOMA VIRUS 18 DNA: NEGATIVE
HUMAN PAPILLOMA VIRUS FINAL DIAGNOSIS: NORMAL
HUMAN PAPILLOMA VIRUS OTHER HR: NEGATIVE

## 2023-10-11 ENCOUNTER — ANCILLARY PROCEDURE (OUTPATIENT)
Dept: ULTRASOUND IMAGING | Facility: CLINIC | Age: 53
End: 2023-10-11
Attending: OBSTETRICS & GYNECOLOGY
Payer: COMMERCIAL

## 2023-10-11 DIAGNOSIS — N95.0 PMB (POSTMENOPAUSAL BLEEDING): ICD-10-CM

## 2023-10-11 DIAGNOSIS — D25.1 INTRAMURAL LEIOMYOMA OF UTERUS: ICD-10-CM

## 2023-10-11 PROCEDURE — 76830 TRANSVAGINAL US NON-OB: CPT | Performed by: FAMILY MEDICINE

## 2023-10-11 PROCEDURE — 76856 US EXAM PELVIC COMPLETE: CPT | Performed by: FAMILY MEDICINE

## 2023-10-13 ENCOUNTER — TELEPHONE (OUTPATIENT)
Dept: OBGYN | Facility: CLINIC | Age: 53
End: 2023-10-13
Payer: COMMERCIAL

## 2023-10-13 DIAGNOSIS — R93.89 THICKENED ENDOMETRIUM: ICD-10-CM

## 2023-10-13 DIAGNOSIS — N95.0 PMB (POSTMENOPAUSAL BLEEDING): Primary | ICD-10-CM

## 2023-10-13 NOTE — TELEPHONE ENCOUNTER
Type of surgery: hysteroscopy d&c using morcellator  Location of surgery: [facility]  Date and time of surgery: 11/14/23 @ 11:35 am  Surgeon: Dr. Saldivar  Pre-Op Appt Date: Patient advised to schedule.  Post-Op Appt Date:    Packet sent out: Yes  Pre-cert/Authorization completed:  No  Date: 10/13/23

## 2023-10-13 NOTE — TELEPHONE ENCOUNTER
atient Name: Mirta Sims   MRN: [id number]   Case#: [id number]   Surgeon(s) and Role:      * Shane Saldivar MD - Primary   Date requested: * No dates entered *   Location:  OR   Procedure(s):   HYSTEROSCOPY, WITH DILATION AND CURETTAGE OF UTERUS USING MORCELLATOR

## 2023-10-19 ENCOUNTER — IMMUNIZATION (OUTPATIENT)
Dept: PEDIATRICS | Facility: CLINIC | Age: 53
End: 2023-10-19
Payer: COMMERCIAL

## 2023-10-19 DIAGNOSIS — Z23 HIGH PRIORITY FOR 2019-NCOV VACCINE: ICD-10-CM

## 2023-10-19 DIAGNOSIS — Z23 NEED FOR PROPHYLACTIC VACCINATION AND INOCULATION AGAINST INFLUENZA: Primary | ICD-10-CM

## 2023-10-19 PROCEDURE — 90682 RIV4 VACC RECOMBINANT DNA IM: CPT

## 2023-10-19 PROCEDURE — 91320 SARSCV2 VAC 30MCG TRS-SUC IM: CPT

## 2023-10-19 PROCEDURE — 90471 IMMUNIZATION ADMIN: CPT

## 2023-10-19 PROCEDURE — 90480 ADMN SARSCOV2 VAC 1/ONLY CMP: CPT

## 2023-11-08 ENCOUNTER — OFFICE VISIT (OUTPATIENT)
Dept: PEDIATRICS | Facility: CLINIC | Age: 53
End: 2023-11-08
Payer: COMMERCIAL

## 2023-11-08 VITALS
SYSTOLIC BLOOD PRESSURE: 120 MMHG | BODY MASS INDEX: 30.38 KG/M2 | HEIGHT: 61 IN | DIASTOLIC BLOOD PRESSURE: 79 MMHG | OXYGEN SATURATION: 97 % | TEMPERATURE: 97.6 F | HEART RATE: 54 BPM | WEIGHT: 160.9 LBS | RESPIRATION RATE: 16 BRPM

## 2023-11-08 DIAGNOSIS — N95.0 PMB (POSTMENOPAUSAL BLEEDING): ICD-10-CM

## 2023-11-08 DIAGNOSIS — Z01.818 PRE-OP EXAM: Primary | ICD-10-CM

## 2023-11-08 DIAGNOSIS — G56.02 CARPAL TUNNEL SYNDROME OF LEFT WRIST: ICD-10-CM

## 2023-11-08 PROCEDURE — 99214 OFFICE O/P EST MOD 30 MIN: CPT | Performed by: INTERNAL MEDICINE

## 2023-11-08 ASSESSMENT — PAIN SCALES - GENERAL: PAINLEVEL: NO PAIN (0)

## 2023-11-08 NOTE — PROGRESS NOTES
[facility]  [address]  [address]  [place] [address]  Phone: [phone]  Fax: [phone]  Primary Provider: Tri Rose  Pre-op Performing Provider: DAVON FROST      PREOPERATIVE EVALUATION:  Today's date: 11/8/2023    Mirta is a 53 year old female who presents for a preoperative evaluation.      11/8/2023     3:10 PM   Additional Questions   Roomed by [facility]   Accompanied by self         11/8/2023     3:10 PM   Patient Reported Additional Medications   Patient reports taking the following new medications none       Surgical Information:  Surgery/Procedure: HYSTEROSCOPY, WITH DILATION AND CURETTAGE OF UTERUS USING MORCELLATOR   Surgery Location: [facility]   Surgeon: Shane Saldivar MD   Surgery Date: 11/14/2023  Time of Surgery: 11:40 AM  Where patient plans to recover: At home with family  Fax number for surgical facility: Note does not need to be faxed, will be available electronically in Epic.    Assessment & Plan     The proposed surgical procedure is considered LOW risk.    Pre-op exam  PMB (postmenopausal bleeding)  Patient medically optimized to proceed with above surgery.     Carpal tunnel syndrome of left wrist  Patient complaining of worsening carpal tunnel symptoms in L wrist; previously had surgery on R side and interested in pursuing on L as well.   - Orthopedic  Referral; Future            - No identified additional risk factors other than previously addressed    Antiplatelet or Anticoagulation Medication Instructions:   - Patient is on no antiplatelet or anticoagulation medications.    Additional Medication Instructions:  Patient is to take all scheduled medications on the day of surgery    RECOMMENDATION:  APPROVAL GIVEN to proceed with proposed procedure, without further diagnostic evaluation.        Subjective       HPI related to upcoming procedure: Patient is planning for hysteroscopy and discharge due to  post-menopausal bleeding in setting of atypical endometrial thickening.         11/5/2023     4:15 PM   Preop Questions   1. Have you ever had a heart attack or stroke? No   2. Have you ever had surgery on your heart or blood vessels, such as a stent placement, a coronary artery bypass, or surgery on an artery in your head, neck, heart, or legs? No   3. Do you have chest pain with activity? No   4. Do you have a history of  heart failure? No   5. Do you currently have a cold, bronchitis or symptoms of other infection? No   6. Do you have a cough, shortness of breath, or wheezing? No   7. Do you or anyone in your family have previous history of blood clots? No   8. Do you or does anyone in your family have a serious bleeding problem such as prolonged bleeding following surgeries or cuts? No   9. Have you ever had problems with anemia or been told to take iron pills? YES - in distant past, not recently   10. Have you had any abnormal blood loss such as black, tarry or bloody stools, or abnormal vaginal bleeding? No   11. Have you ever had a blood transfusion? No   12. Are you willing to have a blood transfusion if it is medically needed before, during, or after your surgery? Yes   13. Have you or any of your relatives ever had problems with anesthesia? No   14. Do you have sleep apnea, excessive snoring or daytime drowsiness? No   15. Do you have any artifical heart valves or other implanted medical devices like a pacemaker, defibrillator, or continuous glucose monitor? No   16. Do you have artificial joints? No   17. Are you allergic to latex? No   18. Is there any chance that you may be pregnant? No       Health Care Directive:  Patient does not have a Health Care Directive or Living Will: Discussed advance care planning with patient; however, patient declined at this time.    Preoperative Review of :   reviewed - no record of controlled substances prescribed.      Status of Chronic Conditions:  See problem  list for active medical problems.  Problems all longstanding and stable, except as noted/documented.  See ROS for pertinent symptoms related to these conditions.    Review of Systems  Constitutional, neuro, ENT, endocrine, pulmonary, cardiac, gastrointestinal, genitourinary, musculoskeletal, integument and psychiatric systems are negative, except as otherwise noted.    Patient Active Problem List    Diagnosis Date Noted    Thickened endometrium 10/13/2023     Priority: Medium    PMB (postmenopausal bleeding) 09/19/2023     Priority: Medium    Intramural leiomyoma of uterus 09/19/2023     Priority: Medium    S/P cholecystectomy 12/08/2022     Priority: Medium    Carpal tunnel syndrome of left wrist 01/19/2022     Priority: Medium    Impaired fasting glucose 11/26/2021     Priority: Medium    Screening for cervical cancer      Priority: Medium     Overview of pap from Care Everywhere:  [id number] NIL  2016 NIL, Neg HPV        Female stress incontinence 02/22/2019     Priority: Medium    External hemorrhoids 05/30/2018     Priority: Medium    History of appendicitis with peritoneal abscess 04/03/2018     Priority: Medium     S/p appendectomy      History of iron deficiency 02/19/2018     Priority: Medium    Chronic hepatitis B (H)      Priority: Medium     Was following with [facility] but no longer needs follow up due to low/no viral load, normal liver enzymes, normal ultrasound        Gastritis 01/01/2011     Priority: Medium     endoscopy 1/7/11 showing gastritis        Past Medical History:   Diagnosis Date    Chronic hepatitis B (H)     Likely vertical transmission, follows with [facility]. Low viral low, no tx. Monitoring for HCC to start at age 48.    Gastritis 2011    endoscopy 1/7/11 showing gastritis    Iron deficiency anemia 2015    Patient has iron deficiency anemia. Has seen hematology. Anemia is most likely due to heavy menstrual bleeding. Hematology recommended oral iron 3 times daily for 8 weeks. If  she is responding, then she can continue on the oral iron therapy. The hematologist recommended 3 months of replacement therapy. She finished her iron supplement, which she was taking twice daily, and is not taking any supple    Perforated appendicitis     Post-operative wound abscess      Past Surgical History:   Procedure Laterality Date    COLONOSCOPY      HC HYSTEROS W PERMANENT FALLOPAIN IMPLANT      LAPAROSCOPIC APPENDECTOMY N/A 04/03/2018    Procedure: LAPAROSCOPIC APPENDECTOMY;  LAPAROSCOPIC APPENDECTOMY   ;  Surgeon: Clarence Wayne MD;  Location: [facility] OR    LAPAROSCOPIC CHOLECYSTECTOMY      LAPAROTOMY EXPLORATORY  01/22/2017    Procedure: LAPAROSCOPY CONVERTED TO EXPLORATORY LAPAROTOMY WITH EVACUATION OF RETROCOLONIC ABSCESS X 2; Surgeon: Johnnie Dinero DO; Location: [facility]; Service: General    MESH (IMPLANTABLE)  01/22/2017    Film Anti Adhesion Sepra 4301-02 - Sn.A. Abdomen    SC LAP,APPENDECTOMY N/A 12/26/2016    Procedure: APPENDECTOMY, LAPAROSCOPIC;  Surgeon: Johnnie Dinero DO;  Location: [facility];  Service: General    SC LAP,DIAGNOSTIC ABDOMEN N/A 01/22/2017    Procedure: LAPAROSCOPY CONVERTED TO EXPLORATORY LAPAROTOMY WITH EVACUATION OF RETROCOLONIC ABSCESS X 2;  Surgeon: Johnnie Dinero DO;  Location: [facility];  Service: General    SC OCCLUDE FALLOPIAN TUBE BY DEVICE      Description: Oviductal Surgery Tubal Occlusion By Device;  Recorded: 04/13/2012;    UPPER GASTROINTESTINAL ENDOSCOPY       Current Outpatient Medications   Medication Sig Dispense Refill    acetaminophen (TYLENOL) 500 MG tablet Take 500-1,000 mg by mouth every 6 hours as needed      calcium carbonate (TUMS) 500 MG chewable tablet Take 1 tablet by mouth      hydrocortisone, Perianal, (HYDROCORTISONE) 2.5 % cream Place rectally 2 times daily as needed for hemorrhoids 28 g 3    loratadine (CLARITIN) 10 MG tablet Take 10 mg by mouth daily as needed for allergies      omeprazole (PRILOSEC) 20  "MG DR capsule Take 1 capsule (20 mg) by mouth daily as needed (Reflux) 90 capsule 3    polyethylene glycol (MIRALAX) 17 g packet Take 1 packet by mouth daily      Vitamin D, Cholecalciferol, 25 MCG (1000 UT) TABS Take 1,000 Int'l Units by mouth         Allergies   Allergen Reactions    Strawberry Extract Swelling    Tomato Swelling    Azithromycin Rash     Annotation: on face          Social History     Tobacco Use    Smoking status: Never    Smokeless tobacco: Never   Substance Use Topics    Alcohol use: Not Currently     Comment: occasional        History   Drug Use No         Objective     /79 (BP Location: Right arm, Patient Position: Sitting, Cuff Size: Adult Regular)   Pulse 54   Temp 97.6  F (36.4  C) (Tympanic)   Resp 16   Ht 1.542 m (5' 0.71\")   Wt 73 kg (160 lb 14.4 oz)   LMP 03/20/2018 (Approximate)   SpO2 97%   BMI 30.69 kg/m      Physical Exam    GENERAL APPEARANCE: healthy, alert and no distress     EYES: EOMI, PERRL     HENT: ear canals and TM's normal and nose and mouth without ulcers or lesions     NECK: no adenopathy, no asymmetry, masses, or scars and thyroid normal to palpation     RESP: lungs clear to auscultation - no rales, rhonchi or wheezes     CV: regular rates and rhythm, normal S1 S2, no S3 or S4 and no murmur, click or rub     ABDOMEN:  soft, nontender, no HSM or masses and bowel sounds normal     MS: extremities normal- no gross deformities noted, no evidence of inflammation in joints, FROM in all extremities.     SKIN: no suspicious lesions or rashes     NEURO: Normal strength and tone, sensory exam grossly normal, mentation intact and speech normal     PSYCH: mentation appears normal. and affect normal/bright     LYMPHATICS: No cervical adenopathy    Recent Labs   Lab Test 08/09/23  1507 12/08/22  1018 11/24/21  0848   HGB 14.8  --   --      --   --    NA  --  140 140   POTASSIUM  --  4.0 3.9   CR  --  0.68 0.63   A1C  --  5.5  --         Diagnostics:  No labs " were ordered during this visit.   No EKG required, no history of coronary heart disease, significant arrhythmia, peripheral arterial disease or other structural heart disease.    Revised Cardiac Risk Index (RCRI):  The patient has the following serious cardiovascular risks for perioperative complications:   - No serious cardiac risks = 0 points     RCRI Interpretation: 0 points: Class I (very low risk - 0.4% complication rate)         Signed Electronically by: Macy Jorge MD  Copy of this evaluation report is provided to requesting physician.

## 2023-11-08 NOTE — PATIENT INSTRUCTIONS
You will be called to schedule with an orthopedic specialist for your carpal tunnel.    Ok to take pills the morning of surgery with a small sip of water if needed.

## 2023-11-14 ENCOUNTER — HOSPITAL ENCOUNTER (OUTPATIENT)
Facility: CLINIC | Age: 53
Discharge: HOME | End: 2023-11-14
Attending: OBSTETRICS & GYNECOLOGY | Admitting: OBSTETRICS & GYNECOLOGY
Payer: COMMERCIAL

## 2023-11-14 ENCOUNTER — ANESTHESIA (OUTPATIENT)
Dept: SURGERY | Facility: CLINIC | Age: 53
End: 2023-11-14
Payer: COMMERCIAL

## 2023-11-14 ENCOUNTER — ANESTHESIA EVENT (OUTPATIENT)
Dept: SURGERY | Facility: CLINIC | Age: 53
End: 2023-11-14
Payer: COMMERCIAL

## 2023-11-14 VITALS
BODY MASS INDEX: 30.09 KG/M2 | HEIGHT: 61 IN | OXYGEN SATURATION: 99 % | RESPIRATION RATE: 12 BRPM | DIASTOLIC BLOOD PRESSURE: 82 MMHG | HEART RATE: 56 BPM | SYSTOLIC BLOOD PRESSURE: 130 MMHG | WEIGHT: 159.4 LBS | TEMPERATURE: 97.9 F

## 2023-11-14 DIAGNOSIS — G89.18 POSTOPERATIVE PAIN: ICD-10-CM

## 2023-11-14 DIAGNOSIS — N95.0 PMB (POSTMENOPAUSAL BLEEDING): Primary | ICD-10-CM

## 2023-11-14 DIAGNOSIS — R93.89 THICKENED ENDOMETRIUM: ICD-10-CM

## 2023-11-14 LAB — HGB BLD-MCNC: 14.4 G/DL (ref 11.7–15.7)

## 2023-11-14 PROCEDURE — 258N000003 HC RX IP 258 OP 636: Performed by: NURSE ANESTHETIST, CERTIFIED REGISTERED

## 2023-11-14 PROCEDURE — 250N000011 HC RX IP 250 OP 636: Performed by: OBSTETRICS & GYNECOLOGY

## 2023-11-14 PROCEDURE — 250N000009 HC RX 250: Performed by: NURSE ANESTHETIST, CERTIFIED REGISTERED

## 2023-11-14 PROCEDURE — 710N000012 HC RECOVERY PHASE 2, PER MINUTE: Performed by: OBSTETRICS & GYNECOLOGY

## 2023-11-14 PROCEDURE — 258N000003 HC RX IP 258 OP 636: Performed by: ANESTHESIOLOGY

## 2023-11-14 PROCEDURE — 370N000017 HC ANESTHESIA TECHNICAL FEE, PER MIN: Performed by: OBSTETRICS & GYNECOLOGY

## 2023-11-14 PROCEDURE — 88305 TISSUE EXAM BY PATHOLOGIST: CPT | Mod: TC | Performed by: OBSTETRICS & GYNECOLOGY

## 2023-11-14 PROCEDURE — 250N000011 HC RX IP 250 OP 636: Performed by: ANESTHESIOLOGY

## 2023-11-14 PROCEDURE — 250N000013 HC RX MED GY IP 250 OP 250 PS 637: Performed by: OBSTETRICS & GYNECOLOGY

## 2023-11-14 PROCEDURE — 250N000025 HC SEVOFLURANE, PER MIN: Performed by: OBSTETRICS & GYNECOLOGY

## 2023-11-14 PROCEDURE — 250N000009 HC RX 250: Performed by: ANESTHESIOLOGY

## 2023-11-14 PROCEDURE — 36415 COLL VENOUS BLD VENIPUNCTURE: CPT | Performed by: OBSTETRICS & GYNECOLOGY

## 2023-11-14 PROCEDURE — 85018 HEMOGLOBIN: CPT | Performed by: OBSTETRICS & GYNECOLOGY

## 2023-11-14 PROCEDURE — 250N000013 HC RX MED GY IP 250 OP 250 PS 637: Performed by: ANESTHESIOLOGY

## 2023-11-14 PROCEDURE — 88305 TISSUE EXAM BY PATHOLOGIST: CPT | Mod: 26 | Performed by: PATHOLOGY

## 2023-11-14 PROCEDURE — 250N000011 HC RX IP 250 OP 636: Performed by: NURSE ANESTHETIST, CERTIFIED REGISTERED

## 2023-11-14 PROCEDURE — 710N000009 HC RECOVERY PHASE 1, LEVEL 1, PER MIN: Performed by: OBSTETRICS & GYNECOLOGY

## 2023-11-14 PROCEDURE — 58558 HYSTEROSCOPY BIOPSY: CPT | Performed by: OBSTETRICS & GYNECOLOGY

## 2023-11-14 PROCEDURE — 360N000076 HC SURGERY LEVEL 3, PER MIN: Performed by: OBSTETRICS & GYNECOLOGY

## 2023-11-14 PROCEDURE — 272N000001 HC OR GENERAL SUPPLY STERILE: Performed by: OBSTETRICS & GYNECOLOGY

## 2023-11-14 PROCEDURE — 999N000141 HC STATISTIC PRE-PROCEDURE NURSING ASSESSMENT: Performed by: OBSTETRICS & GYNECOLOGY

## 2023-11-14 RX ORDER — ACETAMINOPHEN 325 MG/1
975 TABLET ORAL ONCE
Status: DISCONTINUED | OUTPATIENT
Start: 2023-11-14 | End: 2023-11-14 | Stop reason: HOSPADM

## 2023-11-14 RX ORDER — SCOLOPAMINE TRANSDERMAL SYSTEM 1 MG/1
1 PATCH, EXTENDED RELEASE TRANSDERMAL
Status: DISCONTINUED | OUTPATIENT
Start: 2023-11-14 | End: 2023-11-14 | Stop reason: HOSPADM

## 2023-11-14 RX ORDER — KETOROLAC TROMETHAMINE 15 MG/ML
15 INJECTION, SOLUTION INTRAMUSCULAR; INTRAVENOUS
Status: DISCONTINUED | OUTPATIENT
Start: 2023-11-14 | End: 2023-11-14 | Stop reason: HOSPADM

## 2023-11-14 RX ORDER — ONDANSETRON 2 MG/ML
4 INJECTION INTRAMUSCULAR; INTRAVENOUS EVERY 30 MIN PRN
Status: DISCONTINUED | OUTPATIENT
Start: 2023-11-14 | End: 2023-11-14 | Stop reason: HOSPADM

## 2023-11-14 RX ORDER — OXYCODONE HYDROCHLORIDE 5 MG/1
10 TABLET ORAL
Status: DISCONTINUED | OUTPATIENT
Start: 2023-11-14 | End: 2023-11-14 | Stop reason: HOSPADM

## 2023-11-14 RX ORDER — HYDROMORPHONE HCL IN WATER/PF 6 MG/30 ML
0.2 PATIENT CONTROLLED ANALGESIA SYRINGE INTRAVENOUS EVERY 5 MIN PRN
Status: DISCONTINUED | OUTPATIENT
Start: 2023-11-14 | End: 2023-11-14 | Stop reason: HOSPADM

## 2023-11-14 RX ORDER — ONDANSETRON 2 MG/ML
INJECTION INTRAMUSCULAR; INTRAVENOUS PRN
Status: DISCONTINUED | OUTPATIENT
Start: 2023-11-14 | End: 2023-11-14

## 2023-11-14 RX ORDER — SODIUM CHLORIDE, SODIUM LACTATE, POTASSIUM CHLORIDE, CALCIUM CHLORIDE 600; 310; 30; 20 MG/100ML; MG/100ML; MG/100ML; MG/100ML
INJECTION, SOLUTION INTRAVENOUS CONTINUOUS
Status: DISCONTINUED | OUTPATIENT
Start: 2023-11-14 | End: 2023-11-14 | Stop reason: HOSPADM

## 2023-11-14 RX ORDER — GLYCOPYRROLATE 0.2 MG/ML
INJECTION, SOLUTION INTRAMUSCULAR; INTRAVENOUS PRN
Status: DISCONTINUED | OUTPATIENT
Start: 2023-11-14 | End: 2023-11-14

## 2023-11-14 RX ORDER — HYDROMORPHONE HCL IN WATER/PF 6 MG/30 ML
0.4 PATIENT CONTROLLED ANALGESIA SYRINGE INTRAVENOUS EVERY 5 MIN PRN
Status: DISCONTINUED | OUTPATIENT
Start: 2023-11-14 | End: 2023-11-14 | Stop reason: HOSPADM

## 2023-11-14 RX ORDER — FENTANYL CITRATE 50 UG/ML
INJECTION, SOLUTION INTRAMUSCULAR; INTRAVENOUS PRN
Status: DISCONTINUED | OUTPATIENT
Start: 2023-11-14 | End: 2023-11-14

## 2023-11-14 RX ORDER — PROPOFOL 10 MG/ML
INJECTION, EMULSION INTRAVENOUS PRN
Status: DISCONTINUED | OUTPATIENT
Start: 2023-11-14 | End: 2023-11-14

## 2023-11-14 RX ORDER — DEXAMETHASONE SODIUM PHOSPHATE 4 MG/ML
INJECTION, SOLUTION INTRA-ARTICULAR; INTRALESIONAL; INTRAMUSCULAR; INTRAVENOUS; SOFT TISSUE PRN
Status: DISCONTINUED | OUTPATIENT
Start: 2023-11-14 | End: 2023-11-14

## 2023-11-14 RX ORDER — LIDOCAINE HYDROCHLORIDE 20 MG/ML
INJECTION, SOLUTION INFILTRATION; PERINEURAL PRN
Status: DISCONTINUED | OUTPATIENT
Start: 2023-11-14 | End: 2023-11-14

## 2023-11-14 RX ORDER — LABETALOL HYDROCHLORIDE 5 MG/ML
10 INJECTION, SOLUTION INTRAVENOUS EVERY 5 MIN PRN
Status: DISCONTINUED | OUTPATIENT
Start: 2023-11-14 | End: 2023-11-14 | Stop reason: HOSPADM

## 2023-11-14 RX ORDER — LIDOCAINE 40 MG/G
CREAM TOPICAL
Status: DISCONTINUED | OUTPATIENT
Start: 2023-11-14 | End: 2023-11-14 | Stop reason: HOSPADM

## 2023-11-14 RX ORDER — ONDANSETRON 4 MG/1
4 TABLET, ORALLY DISINTEGRATING ORAL EVERY 30 MIN PRN
Status: DISCONTINUED | OUTPATIENT
Start: 2023-11-14 | End: 2023-11-14 | Stop reason: HOSPADM

## 2023-11-14 RX ORDER — CEFAZOLIN SODIUM/WATER 2 G/20 ML
2 SYRINGE (ML) INTRAVENOUS SEE ADMIN INSTRUCTIONS
Status: DISCONTINUED | OUTPATIENT
Start: 2023-11-14 | End: 2023-11-14 | Stop reason: HOSPADM

## 2023-11-14 RX ORDER — CEFAZOLIN SODIUM/WATER 2 G/20 ML
2 SYRINGE (ML) INTRAVENOUS
Status: COMPLETED | OUTPATIENT
Start: 2023-11-14 | End: 2023-11-14

## 2023-11-14 RX ORDER — IBUPROFEN 600 MG/1
600 TABLET, FILM COATED ORAL EVERY 6 HOURS PRN
Qty: 30 TABLET | Refills: 0 | Status: SHIPPED | OUTPATIENT
Start: 2023-11-14 | End: 2023-12-14

## 2023-11-14 RX ORDER — OXYCODONE HYDROCHLORIDE 5 MG/1
5 TABLET ORAL
Status: COMPLETED | OUTPATIENT
Start: 2023-11-14 | End: 2023-11-14

## 2023-11-14 RX ORDER — FENTANYL CITRATE 50 UG/ML
25 INJECTION, SOLUTION INTRAMUSCULAR; INTRAVENOUS EVERY 5 MIN PRN
Status: DISCONTINUED | OUTPATIENT
Start: 2023-11-14 | End: 2023-11-14 | Stop reason: HOSPADM

## 2023-11-14 RX ORDER — ACETAMINOPHEN 325 MG/1
975 TABLET ORAL ONCE
Status: COMPLETED | OUTPATIENT
Start: 2023-11-14 | End: 2023-11-14

## 2023-11-14 RX ORDER — FENTANYL CITRATE 50 UG/ML
50 INJECTION, SOLUTION INTRAMUSCULAR; INTRAVENOUS EVERY 5 MIN PRN
Status: DISCONTINUED | OUTPATIENT
Start: 2023-11-14 | End: 2023-11-14 | Stop reason: HOSPADM

## 2023-11-14 RX ADMIN — DEXAMETHASONE SODIUM PHOSPHATE 4 MG: 4 INJECTION, SOLUTION INTRA-ARTICULAR; INTRALESIONAL; INTRAMUSCULAR; INTRAVENOUS; SOFT TISSUE at 12:28

## 2023-11-14 RX ADMIN — LIDOCAINE HYDROCHLORIDE 30 MG: 20 INJECTION, SOLUTION INFILTRATION; PERINEURAL at 11:51

## 2023-11-14 RX ADMIN — GLYCOPYRROLATE 0.2 MG: 0.2 INJECTION, SOLUTION INTRAMUSCULAR; INTRAVENOUS at 12:02

## 2023-11-14 RX ADMIN — FENTANYL CITRATE 100 MCG: 50 INJECTION INTRAMUSCULAR; INTRAVENOUS at 11:51

## 2023-11-14 RX ADMIN — PHENYLEPHRINE HYDROCHLORIDE 100 MCG: 10 INJECTION INTRAVENOUS at 11:56

## 2023-11-14 RX ADMIN — FENTANYL CITRATE 25 MCG: 50 INJECTION, SOLUTION INTRAMUSCULAR; INTRAVENOUS at 12:54

## 2023-11-14 RX ADMIN — SCOPALAMINE 1 PATCH: 1 PATCH, EXTENDED RELEASE TRANSDERMAL at 11:01

## 2023-11-14 RX ADMIN — OXYCODONE HYDROCHLORIDE 5 MG: 5 TABLET ORAL at 13:16

## 2023-11-14 RX ADMIN — PHENYLEPHRINE HYDROCHLORIDE 100 MCG: 10 INJECTION INTRAVENOUS at 12:02

## 2023-11-14 RX ADMIN — SODIUM CHLORIDE, POTASSIUM CHLORIDE, SODIUM LACTATE AND CALCIUM CHLORIDE: 600; 310; 30; 20 INJECTION, SOLUTION INTRAVENOUS at 11:47

## 2023-11-14 RX ADMIN — Medication 2 G: at 11:47

## 2023-11-14 RX ADMIN — ONDANSETRON 4 MG: 2 INJECTION INTRAMUSCULAR; INTRAVENOUS at 12:17

## 2023-11-14 RX ADMIN — PHENYLEPHRINE HYDROCHLORIDE 50 MCG: 10 INJECTION INTRAVENOUS at 12:15

## 2023-11-14 RX ADMIN — PROPOFOL 170 MG: 10 INJECTION, EMULSION INTRAVENOUS at 11:51

## 2023-11-14 RX ADMIN — ACETAMINOPHEN 975 MG: 325 TABLET, FILM COATED ORAL at 11:02

## 2023-11-14 ASSESSMENT — ACTIVITIES OF DAILY LIVING (ADL)
ADLS_ACUITY_SCORE: 36
ADLS_ACUITY_SCORE: 36

## 2023-11-14 NOTE — ANESTHESIA CARE TRANSFER NOTE
Patient: Mirta HADDAD Branch    Procedure: Procedure(s):  HYSTEROSCOPY, WITH DILATION AND CURETTAGE OF UTERUS USING MORCELLATOR       Diagnosis: Thickened endometrium [R93.89]  PMB (postmenopausal bleeding) [N95.0]  Diagnosis Additional Information: No value filed.    Anesthesia Type:   General     Note:    Oropharynx: oropharynx clear of all foreign objects and spontaneously breathing  Level of Consciousness: drowsy  Oxygen Supplementation: face mask  Level of Supplemental Oxygen (L/min / FiO2): 10  Independent Airway: airway patency satisfactory and stable  Dentition: dentition unchanged  Vital Signs Stable: post-procedure vital signs reviewed and stable  Report to RN Given: handoff report given  Patient transferred to: PACU    Handoff Report: Identifed the Patient, Identified the Reponsible Provider, Reviewed the pertinent medical history, Discussed the surgical course, Reviewed Intra-OP anesthesia mangement and issues during anesthesia, Set expectations for post-procedure period and Allowed opportunity for questions and acknowledgement of understanding      Vitals:  Vitals Value Taken Time   BP     Temp     Pulse     Resp     SpO2         Electronically Signed By: JUDY Duran CRNA  November 14, 2023  12:35 PM

## 2023-11-14 NOTE — ANESTHESIA PREPROCEDURE EVALUATION
Anesthesia Pre-Procedure Evaluation    Patient: Mirta Sims   MRN: [id number] : 1970        Procedure : Procedure(s):  HYSTEROSCOPY, WITH DILATION AND CURETTAGE OF UTERUS USING MORCELLATOR          Past Medical History:   Diagnosis Date    Chronic hepatitis B (H)     Likely vertical transmission, follows with [facility]. Low viral low, no tx. Monitoring for HCC to start at age 48.    Gastritis     endoscopy 11 showing gastritis    Iron deficiency anemia     Patient has iron deficiency anemia. Has seen hematology. Anemia is most likely due to heavy menstrual bleeding. Hematology recommended oral iron 3 times daily for 8 weeks. If she is responding, then she can continue on the oral iron therapy. The hematologist recommended 3 months of replacement therapy. She finished her iron supplement, which she was taking twice daily, and is not taking any supple    Perforated appendicitis     Post-operative wound abscess       Past Surgical History:   Procedure Laterality Date    COLONOSCOPY      HC HYSTEROS W PERMANENT FALLOPAIN IMPLANT      LAPAROSCOPIC APPENDECTOMY N/A 2018    Procedure: LAPAROSCOPIC APPENDECTOMY;  LAPAROSCOPIC APPENDECTOMY   ;  Surgeon: Clarence Wayne MD;  Location:  OR    LAPAROSCOPIC CHOLECYSTECTOMY      LAPAROTOMY EXPLORATORY  2017    Procedure: LAPAROSCOPY CONVERTED TO EXPLORATORY LAPAROTOMY WITH EVACUATION OF RETROCOLONIC ABSCESS X 2; Surgeon: Johnnie Dinero DO; Location: [facility]; Service: General    MESH (IMPLANTABLE)  2017    Film Anti Adhesion Sepra 4301-02 - Sn.A. Abdomen    AZ LAP,APPENDECTOMY N/A 2016    Procedure: APPENDECTOMY, LAPAROSCOPIC;  Surgeon: Johnnie Dinero DO;  Location: [facility];  Service: General    AZ LAP,DIAGNOSTIC ABDOMEN N/A 2017    Procedure: LAPAROSCOPY CONVERTED TO EXPLORATORY LAPAROTOMY WITH EVACUATION OF RETROCOLONIC ABSCESS X 2;  Surgeon: Johnnie Dinero DO;  Location: [facility];   Service: General    UT OCCLUDE FALLOPIAN TUBE BY DEVICE      Description: Oviductal Surgery Tubal Occlusion By Device;  Recorded: 04/13/2012;    UPPER GASTROINTESTINAL ENDOSCOPY        Allergies   Allergen Reactions    Strawberry Extract Swelling    Tomato Swelling    Azithromycin Rash     Annotation: on face        Social History     Tobacco Use    Smoking status: Never    Smokeless tobacco: Never   Substance Use Topics    Alcohol use: Not Currently     Comment: occasional       Wt Readings from Last 1 Encounters:   11/08/23 73 kg (160 lb 14.4 oz)        Anesthesia Evaluation            ROS/MED HX  ENT/Pulmonary:  - neg pulmonary ROS     Neurologic:  - neg neurologic ROS     Cardiovascular:  - neg cardiovascular ROS     METS/Exercise Tolerance: >4 METS    Hematologic:  - neg hematologic  ROS     Musculoskeletal:  - neg musculoskeletal ROS     GI/Hepatic:  - neg GI/hepatic ROS     Renal/Genitourinary:  - neg Renal ROS     Endo:     (+)               Obesity,       Psychiatric/Substance Use:  - neg psychiatric ROS     Infectious Disease:  - neg infectious disease ROS     Malignancy:  - neg malignancy ROS     Other:  - neg other ROS          Physical Exam    Airway        Mallampati: II   TM distance: > 3 FB   Neck ROM: full   Mouth opening: > 3 cm    Respiratory Devices and Support         Dental       (+) Completely normal teeth      Cardiovascular   cardiovascular exam normal       Rhythm and rate: regular and normal     Pulmonary   pulmonary exam normal        breath sounds clear to auscultation           OUTSIDE LABS:  CBC:   Lab Results   Component Value Date    WBC 6.8 08/09/2023    WBC 8.3 06/16/2020    HGB 14.8 08/09/2023    HGB 14.8 06/16/2020    HCT 45.9 08/09/2023    HCT 45.0 06/16/2020     08/09/2023     06/16/2020     BMP:   Lab Results   Component Value Date     12/08/2022     11/24/2021    POTASSIUM 4.0 12/08/2022    POTASSIUM 3.9 11/24/2021    CHLORIDE 103 12/08/2022     "CHLORIDE 108 11/24/2021    CO2 28 12/08/2022    CO2 27 11/24/2021    BUN 16.3 12/08/2022    BUN 16 11/24/2021    CR 0.68 12/08/2022    CR 0.63 11/24/2021    GLC 93 12/08/2022     (H) 11/24/2021     COAGS: No results found for: \"PTT\", \"INR\", \"FIBR\"  POC:   Lab Results   Component Value Date    BGM 92 04/04/2018    HCG Negative 04/03/2018    HCGS Negative 10/24/2019     HEPATIC:   Lab Results   Component Value Date    ALBUMIN 4.3 12/08/2022    PROTTOTAL 7.3 12/08/2022    ALT 17 12/08/2022    AST 25 12/08/2022    ALKPHOS 75 12/08/2022    BILITOTAL 0.6 12/08/2022     OTHER:   Lab Results   Component Value Date    A1C 5.5 12/08/2022    RIVERA 9.4 12/08/2022    LIPASE 216 10/24/2019       Anesthesia Plan    ASA Status:  2    NPO Status:  NPO Appropriate    Anesthesia Type: General.     - Airway: LMA   Induction: Intravenous.   Maintenance: Inhalation.        Consents    Anesthesia Plan(s) and associated risks, benefits, and realistic alternatives discussed. Questions answered and patient/representative(s) expressed understanding.     - Discussed: Risks, Benefits and Alternatives for the PROCEDURE were discussed     - Discussed with:  Patient       Use of blood products discussed: Yes.     - Discussed with: Patient.     - Consented: consented to blood products            Reason for refusal: other.     Postoperative Care    Pain management: IV analgesics, Oral pain medications.   PONV prophylaxis: Ondansetron (or other 5HT-3), Dexamethasone or Solumedrol     Comments:    Other Comments: GA LMA            Emily Ricardo MD  "

## 2023-11-14 NOTE — ANESTHESIA PROCEDURE NOTES
Airway       Patient location during procedure: OR       Procedure Start/Stop Times: 11/14/2023 11:53 AM  Staff -        CRNA: Hudson Russo APRN CRNA       Performed By: CRNAIndications and Patient Condition       Indications for airway management: vickie-procedural and airway protection       Induction type:intravenous       Mask difficulty assessment: 1 - vent by mask    Final Airway Details       Final airway type: supraglottic airway    Supraglottic Airway Details        Type: LMA       Brand: I-Gel       LMA size: 4    Post intubation assessment        Placement verified by: capnometry, equal breath sounds and chest rise        Number of attempts at approach: 1       Secured with: commercial tube stovall       Ease of procedure: easy       Dentition: Intact    Medication(s) Administered   Medication Administration Time: 11/14/2023 11:53 AM

## 2023-11-14 NOTE — DISCHARGE INSTRUCTIONS
LAPAROSCOPY OR HYSTEROSCOPY DISCHARGE INSTRUCTIONS    DR. RYAN GAUTAM M.D.   CLINIC PHONE NUMBER [phone].  [facility]     Maximum acetaminophen (Tylenol) dose from all sources should not exceed 4 grams (4000 mg) per day. Last dose at 10:30. Next available after 4:30 PM    RECOVERY:  YOU MAY HAVE CRAMPS AND BLEEDING FOR A SHORT TIME AFTER THE PROCEDURE.  THIS IS NORMAL.  USE PADS INSTEAD OF TAMPONS.    DON'T DOUCHE OR USE TAMPONS UNTIL YOUR HEALTHCARE PROVIDER SAYS IT IS OK (USUALLY TWO WEEKS).    DON'T USE ANY VAGINAL MEDICINES UNTIL YOU ARE TOLD IT'S OK.    DON'T HAVE SEX UNTIL YOU ARE TOLD IT'S OK.      WHEN TO CALL YOUR HEALTHCARE PROVIDER:  HEAVY BLEEDING (MORE THAN 1 PAD AN HOUR FOR 2 OR MORE HOURS).    A FEVER .4 F (38 C) OR HIGHER, OR WHAT IS DIRECTED BY YOUR PROVIDER.    INCREASING BELLY (ABDOMINAL) PAIN OR SORENESS.    BAD-SMELLING DISCHARGE.        FOLLOW-UP CARE:  SCHEDULE A FOLLOW-UP VISIT WITH YOUR HEALTHCARE PROVIDER UNLESS YOU ALREADY HAVE AN APPOINTMENT.  BASED ON YOUR TEST RESULTS, YOU MAY NEED MORE TREATMENT.    GENERAL ANESTHESIA OR SEDATION ADULT DISCHARGE INSTRUCTIONS   SPECIAL PRECAUTIONS FOR 24 HOURS AFTER SURGERY    IT IS NOT UNUSUAL TO FEEL LIGHT-HEADED OR FAINT, UP TO 24 HOURS AFTER SURGERY OR WHILE TAKING PAIN MEDICATION.  IF YOU HAVE THESE SYMPTOMS; SIT FOR A FEW MINUTES BEFORE STANDING AND HAVE SOMEONE ASSIST YOU WHEN YOU GET UP TO WALK OR USE THE BATHROOM.    YOU SHOULD REST AND RELAX FOR THE NEXT 24 HOURS AND YOU MUST MAKE ARRANGEMENTS TO HAVE SOMEONE STAY WITH YOU FOR AT LEAST 24 HOURS AFTER YOUR DISCHARGE.  AVOID HAZARDOUS AND STRENUOUS ACTIVITIES.  DO NOT MAKE IMPORTANT DECISIONS FOR 24 HOURS.    DO NOT DRIVE ANY VEHICLE OR OPERATE MECHANICAL EQUIPMENT FOR 24 HOURS FOLLOWING THE END OF YOUR SURGERY.  EVEN THOUGH YOU MAY FEEL NORMAL, YOUR REACTIONS MAY BE AFFECTED BY THE MEDICATION YOU HAVE RECEIVED.    DO NOT DRINK ALCOHOLIC BEVERAGES FOR 24 HOURS FOLLOWING YOUR  SURGERY.    DRINK CLEAR LIQUIDS (APPLE JUICE, GINGER ALE, 7-UP, BROTH, ETC.).  PROGRESS TO YOUR REGULAR DIET AS YOU FEEL ABLE.    YOU MAY HAVE A DRY MOUTH, A SORE THROAT, MUSCLES ACHES OR TROUBLE SLEEPING.  THESE SHOULD GO AWAY AFTER 24 HOURS.    CALL YOUR DOCTOR FOR ANY OF THE FOLLOWING:  SIGNS OF INFECTION (FEVER, GROWING TENDERNESS AT THE SURGERY SITE, A LARGE AMOUNT OF DRAINAGE OR BLEEDING, SEVERE PAIN, FOUL-SMELLING DRAINAGE, REDNESS OR SWELLING.    IT HAS BEEN OVER 8 TO 10 HOURS SINCE SURGERY AND YOU ARE STILL NOT ABLE TO URINATE (PASS WATER).       Transderm Scop (Scopolamine) Patch    The Transderm Scop patch placed behind your ear helps to prevent nausea and vomiting associated with the use of anesthesia and certain analgesics used during or after many types surgery.  You will need to remove this patch after 3 days.  However, it may be removed sooner if you are no longer concerned about nausea or vomiting.      The most common side effects:  ?  dryness of the mouth  ?  drowsiness  ?  blurred vision  ?  dilation of pupils  ?  disorientation, memory disturbances and/or confusion  ?  dizziness  ?  restlessness  ?  hallucinations  ?  difficulty urinating  ?  skin rash  ?  red or painful eyes    Avoid drinking alcohol while using Transderm Scop patch.  Be careful about driving or operating any machinery while using this medication since it may make you drowsy.  In the unlikely event that you experience pain in the eye, which may be accompanied by widening of the pupil, eye redness and blurred vision, remove the Transderm Scop Patch immediately and consult your doctor.    Removal of Transderm Scop Patch:  To remove the patch simply peel it off your skin.  Be sure to wash your hands and the area behind your ear thoroughly with soap and water.  The Transderm Scop Patch will continue to have some active ingredient after use.  Therefore, to avoid accidental contact or ingestion by children or pets, fold the used  patch in half with the sticky side together and dispose in the trash out of reach of children and pets.  If you wear contact lens, be sure to wash your hands first before handling contact lens.      Removal date:_11/15/23 @ 0290

## 2023-11-14 NOTE — OP NOTE
Operative Note    PREOPERATIVE DIAGNOSIS: Postmenopausal bleeding, Thickened endometrium.   POSTOPERATIVE DIAGNOSIS: Endometrial polyp .   PROCEDURE: Hysteroscopic endometrial polypectomy and curettage with intrauterine morcellator.   SURGEON: Shane Saldivar MD   ANESTHESIA: General endotracheal anesthesia.   FLUIDS: 600 cc lactated Ringer's, Ancef 2 gm and uterine distention medium was normal saline with 130 cc deficit total.   ESTIMATED BLOOD LOSS: 1 cc   DRAINS: None.     INDICATIONS FOR PROCEDURE: Patient is a 53 year old woman who has had a recent episode of postmenopausal bleeding.  She had an ultrasound showing a thickened endometrium and a small midline anterior myoma, but otherwise a normal uterus and normal adnexa. The patient underwent an office endometrial biopsy which showed benign endometrium, but this did not explain her thickened endometrium. Therefore, the patient presents today for operative hysteroscopy to resect any anatomic lesions as well as a dilatation and curettage to assess the endometrium, confirm histologic diagnosis, and hopefully resolve her symptoms. The patient understands the indication for the procedure as well as the potential risks such as bleeding, infection, injury to the cervix, uterus, tubes, ovaries, bowel, bladder, any other intra-abdominal or pelvic organs as well as the risk of fluid overload and electrolyte imbalance that can occur in an operative hysteroscopy. She accepts all these risks, as well as the risks of anesthesia including aspiration, malignant hyperthermia and death. She has given informed consent.     FINDINGS: On pelvic exam under anesthesia, the uterus is normal size, shape, contour, retroverted. There are no adnexal masses. On hysteroscopy, there is a 2 cm endometrial polyp arising from left cornual area of endometrial cavity. Both tubal ostia appeared normal.   SPECIMENS: 1) Endocervical curetting, 2) Endometrial polyp and Endometrial curetting.    COMPLICATIONS: None.     PROCEDURE IN DETAIL: After proper patient identification and consent for procedure was obtained, the patient was taken to the operating room where adequate general endotracheal anesthesia was obtained. The patient was placed in the dorsal lithotomy position with legs in Yehuda stirrups and pelvic exam under anesthesia was performed with the above-noted findings. She was prepped and draped in the usual sterile manner for this procedure. A single-arm speculum was placed in the vagina to visualize the cervix, which was then grasped at 12 o'clock with a single-tooth tenaculum for downward traction of the uterus. An endocervical curettage was performed, and specimen sent to pathology. Using Hegar dilators, the cervix was gently dilated to a #6 Hegar dilator without difficulty. The Myosure operative hysteroscope was inserted into the endometrial cavity without difficulty under direct visualization.  Normal saline was used under continuous flow as a uterine distention medium. Fluid management and volumes were monitored with the Fluent fluid management system. The above-noted findings were ascertained. Using the Myosure intrauterine morcellator, the above noted polyp was resected without difficulty. A sharp curettage was performed with the Myosure throughout all quadrants, and specimen was sent to pathology. The saline was evacuated from the uterus and the scope was removed. The tenaculum was removed and the cervix and uterus were hemostatic at the end.   Patient tolerated the procedure well. Sponge and instrument counts were correct x2. The patient was taken to the recovery room and extubated in stable condition.     Shane Saldivar MD

## 2023-11-14 NOTE — ANESTHESIA POSTPROCEDURE EVALUATION
Patient: Mirta Sims    Procedure: Procedure(s):  HYSTEROSCOPY, WITH DILATION AND CURETTAGE OF UTERUS USING MORCELLATOR       Anesthesia Type:  General    Note:  Disposition: Outpatient   Postop Pain Control: Uneventful            Sign Out: Well controlled pain   PONV: No   Neuro/Psych: Uneventful            Sign Out: Acceptable/Baseline neuro status   Airway/Respiratory: Uneventful            Sign Out: Acceptable/Baseline resp. status   CV/Hemodynamics: Uneventful            Sign Out: Acceptable CV status; No obvious hypovolemia; No obvious fluid overload   Other NRE: NONE   DID A NON-ROUTINE EVENT OCCUR? No           Last vitals:  Vitals Value Taken Time   /89 11/14/23 1234   Temp 96.6  F (35.9  C) 11/14/23 1234   Pulse 55 11/14/23 1238   Resp 12 11/14/23 1238   SpO2 100 % 11/14/23 1238   Vitals shown include unfiled device data.    Electronically Signed By: Hudson Washburn MD  November 14, 2023  12:39 PM

## 2023-11-18 LAB
PATH REPORT.COMMENTS IMP SPEC: NORMAL
PATH REPORT.COMMENTS IMP SPEC: NORMAL
PATH REPORT.FINAL DX SPEC: NORMAL
PATH REPORT.GROSS SPEC: NORMAL
PATH REPORT.MICROSCOPIC SPEC OTHER STN: NORMAL
PATH REPORT.RELEVANT HX SPEC: NORMAL
PHOTO IMAGE: NORMAL

## 2023-12-13 ASSESSMENT — ENCOUNTER SYMPTOMS
ABDOMINAL PAIN: 0
PARESTHESIAS: 0
NAUSEA: 0
DIZZINESS: 0
NERVOUS/ANXIOUS: 0
DIARRHEA: 0
DYSURIA: 0
CHILLS: 0
ARTHRALGIAS: 0
JOINT SWELLING: 0
FEVER: 0
WEAKNESS: 0
CONSTIPATION: 0
SORE THROAT: 0
HEMATURIA: 0
SHORTNESS OF BREATH: 0
BREAST MASS: 0
HEARTBURN: 0
HEMATOCHEZIA: 0
PALPITATIONS: 0
MYALGIAS: 0
HEADACHES: 0
COUGH: 0
FREQUENCY: 0
EYE PAIN: 0

## 2023-12-14 ENCOUNTER — OFFICE VISIT (OUTPATIENT)
Dept: PEDIATRICS | Facility: CLINIC | Age: 53
End: 2023-12-14
Payer: COMMERCIAL

## 2023-12-14 VITALS
OXYGEN SATURATION: 99 % | DIASTOLIC BLOOD PRESSURE: 50 MMHG | TEMPERATURE: 97.1 F | SYSTOLIC BLOOD PRESSURE: 120 MMHG | BODY MASS INDEX: 30.58 KG/M2 | HEIGHT: 61 IN | HEART RATE: 61 BPM | WEIGHT: 162 LBS

## 2023-12-14 DIAGNOSIS — K64.4 EXTERNAL HEMORRHOIDS: ICD-10-CM

## 2023-12-14 DIAGNOSIS — Z00.00 ROUTINE GENERAL MEDICAL EXAMINATION AT A HEALTH CARE FACILITY: Primary | ICD-10-CM

## 2023-12-14 LAB
ALBUMIN SERPL BCG-MCNC: 4 G/DL (ref 3.5–5.2)
ALP SERPL-CCNC: 64 U/L (ref 40–150)
ALT SERPL W P-5'-P-CCNC: 10 U/L (ref 0–50)
ANION GAP SERPL CALCULATED.3IONS-SCNC: 11 MMOL/L (ref 7–15)
AST SERPL W P-5'-P-CCNC: 34 U/L (ref 0–45)
BILIRUB SERPL-MCNC: 0.7 MG/DL
BUN SERPL-MCNC: 12.1 MG/DL (ref 6–20)
CALCIUM SERPL-MCNC: 9.4 MG/DL (ref 8.6–10)
CHLORIDE SERPL-SCNC: 105 MMOL/L (ref 98–107)
CHOLEST SERPL-MCNC: 181 MG/DL
CREAT SERPL-MCNC: 0.57 MG/DL (ref 0.51–0.95)
DEPRECATED HCO3 PLAS-SCNC: 20 MMOL/L (ref 22–29)
EGFRCR SERPLBLD CKD-EPI 2021: >90 ML/MIN/1.73M2
FASTING STATUS PATIENT QL REPORTED: YES
GLUCOSE SERPL-MCNC: 100 MG/DL (ref 70–99)
HBA1C MFR BLD: 5.3 % (ref 0–5.6)
HDLC SERPL-MCNC: 64 MG/DL
LDLC SERPL CALC-MCNC: 102 MG/DL
NONHDLC SERPL-MCNC: 117 MG/DL
POTASSIUM SERPL-SCNC: 4.3 MMOL/L (ref 3.4–5.3)
PROT SERPL-MCNC: 7 G/DL (ref 6.4–8.3)
SODIUM SERPL-SCNC: 136 MMOL/L (ref 135–145)
TRIGL SERPL-MCNC: 74 MG/DL
VIT D+METAB SERPL-MCNC: 37 NG/ML (ref 20–50)

## 2023-12-14 PROCEDURE — 83036 HEMOGLOBIN GLYCOSYLATED A1C: CPT | Performed by: STUDENT IN AN ORGANIZED HEALTH CARE EDUCATION/TRAINING PROGRAM

## 2023-12-14 PROCEDURE — 80053 COMPREHEN METABOLIC PANEL: CPT | Performed by: STUDENT IN AN ORGANIZED HEALTH CARE EDUCATION/TRAINING PROGRAM

## 2023-12-14 PROCEDURE — 36415 COLL VENOUS BLD VENIPUNCTURE: CPT | Performed by: STUDENT IN AN ORGANIZED HEALTH CARE EDUCATION/TRAINING PROGRAM

## 2023-12-14 PROCEDURE — 80061 LIPID PANEL: CPT | Performed by: STUDENT IN AN ORGANIZED HEALTH CARE EDUCATION/TRAINING PROGRAM

## 2023-12-14 PROCEDURE — 99396 PREV VISIT EST AGE 40-64: CPT | Performed by: STUDENT IN AN ORGANIZED HEALTH CARE EDUCATION/TRAINING PROGRAM

## 2023-12-14 PROCEDURE — 82306 VITAMIN D 25 HYDROXY: CPT | Performed by: STUDENT IN AN ORGANIZED HEALTH CARE EDUCATION/TRAINING PROGRAM

## 2023-12-14 RX ORDER — HYDROCORTISONE 25 MG/G
CREAM TOPICAL 2 TIMES DAILY PRN
Qty: 28 G | Refills: 3 | Status: SHIPPED | OUTPATIENT
Start: 2023-12-14

## 2023-12-14 ASSESSMENT — ENCOUNTER SYMPTOMS
DIZZINESS: 0
SORE THROAT: 0
WEAKNESS: 0
HEARTBURN: 0
BREAST MASS: 0
CHILLS: 0
HEADACHES: 0
HEMATURIA: 0
NAUSEA: 0
NERVOUS/ANXIOUS: 0
PARESTHESIAS: 0
ARTHRALGIAS: 0
COUGH: 0
FEVER: 0
DYSURIA: 0
PALPITATIONS: 0
DIARRHEA: 0
JOINT SWELLING: 0
FREQUENCY: 0
HEMATOCHEZIA: 0
MYALGIAS: 0
ABDOMINAL PAIN: 0
CONSTIPATION: 0
EYE PAIN: 0
SHORTNESS OF BREATH: 0

## 2023-12-14 NOTE — PROGRESS NOTES
SUBJECTIVE:   Mirta is a 53 year old, presenting for the following:  Physical      Healthy Habits:     Getting at least 3 servings of Calcium per day:  Yes    Bi-annual eye exam:  Yes    Dental care twice a year:  Yes    Sleep apnea or symptoms of sleep apnea:  None    Diet:  Regular (no restrictions)    Frequency of exercise:  2-3 days/week    Duration of exercise:  30-45 minutes    Taking medications regularly:  Yes    Medication side effects:  None    Additional concerns today:  No    Carpal tunnel on left but holding off on surgery    Exercise: walk, hike; some yoga      Social History     Tobacco Use    Smoking status: Never    Smokeless tobacco: Never   Substance Use Topics    Alcohol use: Not Currently     Comment: occasional              2023     4:50 PM   Alcohol Use   Prescreen: >3 drinks/day or >7 drinks/week? No     Reviewed orders with patient.  Reviewed health maintenance and updated orders accordingly - Yes    Breast Cancer Screenin/8/2022     8:53 AM   Breast CA Risk Assessment (FHS-7)   Do you have a family history of breast, colon, or ovarian cancer? No / Unknown           Pertinent mammograms are reviewed under the imaging tab.    History of abnormal Pap smear: NO - age 30-65 PAP every 5 years with negative HPV co-testing recommended      Latest Ref Rng & Units 2023     3:16 PM 2019     9:03 AM 2019     8:43 AM   PAP / HPV   PAP  Negative for Intraepithelial Lesion or Malignancy (NILM)      PAP (Historical)    NIL    HPV 16 DNA Negative Negative  Negative     HPV 18 DNA Negative Negative  Negative     Other HR HPV Negative Negative  Negative       Reviewed and updated as needed this visit by clinical staff   Tobacco  Allergies  Meds            Tri Rose MD on 2023 at 8:50 AM    Reviewed and updated as needed this visit by Provider                     Review of Systems   Constitutional:  Negative for chills and fever.   HENT:  Negative for  "congestion, ear pain, hearing loss and sore throat.    Eyes:  Negative for pain and visual disturbance.   Respiratory:  Negative for cough and shortness of breath.    Cardiovascular:  Negative for chest pain, palpitations and peripheral edema.   Gastrointestinal:  Negative for abdominal pain, constipation, diarrhea, heartburn, hematochezia and nausea.   Breasts:  Negative for tenderness, breast mass and discharge.   Genitourinary:  Negative for dysuria, frequency, genital sores, hematuria, pelvic pain, urgency, vaginal bleeding and vaginal discharge.   Musculoskeletal:  Negative for arthralgias, joint swelling and myalgias.   Skin:  Negative for rash.   Neurological:  Negative for dizziness, weakness, headaches and paresthesias.   Psychiatric/Behavioral:  Negative for mood changes. The patient is not nervous/anxious.        OBJECTIVE:   /50 (BP Location: Right arm, Patient Position: Sitting, Cuff Size: Adult Regular)   Pulse 61   Temp 97.1  F (36.2  C) (Temporal)   Ht 1.549 m (5' 1\")   Wt 73.5 kg (162 lb)   LMP 03/20/2018 (Approximate)   SpO2 99%   BMI 30.61 kg/m    Physical Exam  GENERAL: healthy, alert and no distress  EYES: Eyes grossly normal to inspection, and conjunctivae and sclerae normal  HENT: ear canals and TM's normal, nose and mouth without ulcers or lesions  NECK: no adenopathy, no asymmetry, masses, or scars and thyroid normal to palpation  RESP: lungs clear to auscultation - no rales, rhonchi or wheezes  CV: regular rate and rhythm, normal S1 S2, no S3 or S4, no murmur, click or rub, no peripheral edema  ABDOMEN: soft, nontender, no hepatosplenomegaly, no masses  MS: no gross musculoskeletal defects noted, no edema  SKIN: no suspicious lesions or rashes  NEURO: Normal strength and tone, mentation intact and speech normal  PSYCH: mentation appears normal, affect normal/bright    ASSESSMENT/PLAN:       ICD-10-CM    1. Routine general medical examination at a health care facility  Z00.00 " "COMPREHENSIVE METABOLIC PANEL     Vitamin D Deficiency     MA Screen Bilateral w/Jarrell     Lipid panel reflex to direct LDL Fasting     Hemoglobin A1c      2. External hemorrhoids  K64.4 hydrocortisone, Perianal, (HYDROCORTISONE) 2.5 % cream              COUNSELING:  Reviewed preventive health counseling, as reflected in patient instructions      BMI:   Estimated body mass index is 30.61 kg/m  as calculated from the following:    Height as of this encounter: 1.549 m (5' 1\").    Weight as of this encounter: 73.5 kg (162 lb).         She reports that she has never smoked. She has never used smokeless tobacco.          Tri Rose MD  [facility]  "

## 2023-12-14 NOTE — PATIENT INSTRUCTIONS
Orthopedic phone number for carpal tunnel: [phone]     Topical retinol cream: Neutrogena (silver tub) or La Roche Posay (available at [employer], [employer], etc) for the neck        Preventive Health Recommendations  Female Ages 50 - 64    Yearly exam: See your health care provider every year in order to  Review health changes.   Discuss preventive care.    Review your medicines if your doctor has prescribed any.    Get a Pap test every three years (unless you have an abnormal result and your provider advises testing more often).  If you get Pap tests with HPV test, you only need to test every 5 years, unless you have an abnormal result.   You do not need a Pap test if your uterus was removed (hysterectomy) and you have not had cancer.  You should be tested each year for STDs (sexually transmitted diseases) if you're at risk.   Have a mammogram every 1 to 2 years.  Have a colonoscopy at age 45, or have a yearly FIT test (stool test). These exams screen for colon cancer.    Have a cholesterol test every 5 years, or more often if advised.  Have a diabetes test (fasting glucose) every three years. If you are at risk for diabetes, you should have this test more often.   If you are at risk for osteoporosis (brittle bone disease), think about having a bone density scan (DEXA).    Shots: Get a flu shot each year. Get a tetanus shot every 10 years.    Nutrition:   Eat at least 5 servings of fruits and vegetables each day.  Eat whole-grain bread, whole-wheat pasta and brown rice instead of white grains and rice.  Get adequate Calcium and Vitamin D.     Lifestyle  Exercise at least 150 minutes a week (30 minutes a day, 5 days a week). This will help you control your weight and prevent disease.  Limit alcohol to one drink per day.  No smoking.   Wear sunscreen to prevent skin cancer.   See your dentist every six months for an exam and cleaning.  See your eye doctor every 1 to 2 years.

## 2023-12-27 ENCOUNTER — ANCILLARY PROCEDURE (OUTPATIENT)
Dept: MAMMOGRAPHY | Facility: CLINIC | Age: 53
End: 2023-12-27
Attending: STUDENT IN AN ORGANIZED HEALTH CARE EDUCATION/TRAINING PROGRAM
Payer: COMMERCIAL

## 2023-12-27 DIAGNOSIS — Z00.00 ROUTINE GENERAL MEDICAL EXAMINATION AT A HEALTH CARE FACILITY: ICD-10-CM

## 2023-12-27 PROCEDURE — 77067 SCR MAMMO BI INCL CAD: CPT | Mod: TC | Performed by: RADIOLOGY

## 2024-07-02 NOTE — PATIENT INSTRUCTIONS
Great meeting you today!  Thank you for taking such great care of yourself!     In terms of your constipation it is safe to take 1 cap of miralax twice a day if you need to.    We will check your labs today and let you know the results.    I don't think your chest pain is related to your heart- the fact that it gets better with tylenol and is only with certain movements is reassuring!        Preventive Health Recommendations  Female Ages 50 - 64    Yearly exam: See your health care provider every year in order to  o Review health changes.   o Discuss preventive care.    o Review your medicines if your doctor has prescribed any.      Get a Pap test every three years (unless you have an abnormal result and your provider advises testing more often).    If you get Pap tests with HPV test, you only need to test every 5 years, unless you have an abnormal result.     You do not need a Pap test if your uterus was removed (hysterectomy) and you have not had cancer.    You should be tested each year for STDs (sexually transmitted diseases) if you're at risk.     Have a mammogram every 1 to 2 years.    Have a colonoscopy at age 50, or have a yearly FIT test (stool test). These exams screen for colon cancer.      Have a cholesterol test every 5 years, or more often if advised.    Have a diabetes test (fasting glucose) every three years. If you are at risk for diabetes, you should have this test more often.     If you are at risk for osteoporosis (brittle bone disease), think about having a bone density scan (DEXA).    Shots: Get a flu shot each year. Get a tetanus shot every 10 years.    Nutrition:     Eat at least 5 servings of fruits and vegetables each day.    Eat whole-grain bread, whole-wheat pasta and brown rice instead of white grains and rice.    Get adequate Calcium and Vitamin D.     Lifestyle    Exercise at least 150 minutes a week (30 minutes a day, 5 days a week). This will help you control your weight and prevent  Pharmacy was called and they do not have an active refill. I mentioned that on our end, theres active refills; but, on their end, they do not have anything. Pharmacy tech stated to send a new script.  disease.    Limit alcohol to one drink per day.    No smoking.     Wear sunscreen to prevent skin cancer.     See your dentist every six months for an exam and cleaning.    See your eye doctor every 1 to 2 years.

## 2025-01-17 ENCOUNTER — HOSPITAL ENCOUNTER (OUTPATIENT)
Dept: MAMMOGRAPHY | Facility: CLINIC | Age: 55
Discharge: HOME | End: 2025-01-17
Attending: STUDENT IN AN ORGANIZED HEALTH CARE EDUCATION/TRAINING PROGRAM | Admitting: STUDENT IN AN ORGANIZED HEALTH CARE EDUCATION/TRAINING PROGRAM
Payer: COMMERCIAL

## 2025-01-17 DIAGNOSIS — Z12.31 VISIT FOR SCREENING MAMMOGRAM: ICD-10-CM

## 2025-01-17 PROCEDURE — 77063 BREAST TOMOSYNTHESIS BI: CPT

## 2025-01-17 PROCEDURE — 77067 SCR MAMMO BI INCL CAD: CPT

## 2025-03-19 SDOH — HEALTH STABILITY: PHYSICAL HEALTH: ON AVERAGE, HOW MANY MINUTES DO YOU ENGAGE IN EXERCISE AT THIS LEVEL?: 40 MIN

## 2025-03-19 SDOH — HEALTH STABILITY: PHYSICAL HEALTH: ON AVERAGE, HOW MANY DAYS PER WEEK DO YOU ENGAGE IN MODERATE TO STRENUOUS EXERCISE (LIKE A BRISK WALK)?: 3 DAYS

## 2025-03-19 ASSESSMENT — SOCIAL DETERMINANTS OF HEALTH (SDOH): HOW OFTEN DO YOU GET TOGETHER WITH FRIENDS OR RELATIVES?: ONCE A WEEK

## 2025-03-20 ENCOUNTER — OFFICE VISIT (OUTPATIENT)
Dept: PEDIATRICS | Facility: CLINIC | Age: 55
End: 2025-03-20
Payer: COMMERCIAL

## 2025-03-20 VITALS
WEIGHT: 162.2 LBS | TEMPERATURE: 97.3 F | SYSTOLIC BLOOD PRESSURE: 104 MMHG | OXYGEN SATURATION: 97 % | DIASTOLIC BLOOD PRESSURE: 66 MMHG | BODY MASS INDEX: 30.62 KG/M2 | RESPIRATION RATE: 18 BRPM | HEART RATE: 60 BPM | HEIGHT: 61 IN

## 2025-03-20 DIAGNOSIS — B18.1 CHRONIC HEPATITIS B (H): ICD-10-CM

## 2025-03-20 DIAGNOSIS — K64.4 EXTERNAL HEMORRHOIDS: ICD-10-CM

## 2025-03-20 DIAGNOSIS — Z00.00 ROUTINE GENERAL MEDICAL EXAMINATION AT A HEALTH CARE FACILITY: Primary | ICD-10-CM

## 2025-03-20 LAB
EST. AVERAGE GLUCOSE BLD GHB EST-MCNC: 117 MG/DL
HBA1C MFR BLD: 5.7 % (ref 0–5.6)

## 2025-03-20 RX ORDER — HYDROCORTISONE 25 MG/G
CREAM TOPICAL 2 TIMES DAILY PRN
Qty: 28 G | Refills: 3 | Status: SHIPPED | OUTPATIENT
Start: 2025-03-20

## 2025-03-20 ASSESSMENT — PAIN SCALES - GENERAL: PAINLEVEL_OUTOF10: NO PAIN (0)

## 2025-03-20 NOTE — PROGRESS NOTES
"Preventive Care Visit  [facility] Deirdre E. Milligan, MD, Internal Medicine - Pediatrics  Mar 20, 2025      Assessment & Plan     Routine general medical examination at a health care facility  - COMPREHENSIVE METABOLIC PANEL; Future  - Vitamin D Deficiency; Future  - Hemoglobin A1c; Future  - Lipid panel reflex to direct LDL Fasting; Future  - COMPREHENSIVE METABOLIC PANEL  - Vitamin D Deficiency  - Hemoglobin A1c  - Lipid panel reflex to direct LDL Fasting    Chronic hepatitis B (H)  Previously saw [facility], last at end of 2023. At that time hepatitis b surface ag and antibody were not detected. Recommend quant today. If elevated, recommend ultrasound and seeing [facility] hepatology again.  - Hep B Virus DNA Quant Real Time PCR; Future  - Hep B Virus DNA Quant Real Time PCR    External hemorrhoids  - hydrocortisone, Perianal, (HYDROCORTISONE) 2.5 % cream; Place rectally 2 times daily as needed for hemorrhoids.            BMI  Estimated body mass index is 30.4 kg/m  as calculated from the following:    Height as of this encounter: 1.556 m (5' 1.25\").    Weight as of this encounter: 73.6 kg (162 lb 3.2 oz).       Counseling  Appropriate preventive services were addressed with this patient via screening, questionnaire, or discussion as appropriate for fall prevention, nutrition, physical activity, Tobacco-use cessation, social engagement, weight loss and cognition.  Checklist reviewing preventive services available has been given to the patient.  Reviewed patient's diet, addressing concerns and/or questions.   She is at risk for lack of exercise and has been provided with information to increase physical activity for the benefit of her well-being.           Melvina Kirby is a 54 year old, presenting for the following:  Physical        3/20/2025     9:45 AM   Additional Questions   Roomed by Lily   Accompanied by none         3/20/2025     9:45 AM   Patient Reported " Additional Medications   Patient reports taking the following new medications none          HPI     Yoga  Drinks hot water    Had seen [facility] for chronic hepatitis B   -      Advance Care Planning  Patient does not have a Health Care Directive: Discussed advance care planning with patient; information given to patient to review.      3/19/2025   General Health   How would you rate your overall physical health? Good   Feel stress (tense, anxious, or unable to sleep) Only a little   (!) STRESS CONCERN      3/19/2025   Nutrition   Three or more servings of calcium each day? Yes   Diet: Regular (no restrictions)   How many servings of fruit and vegetables per day? 4 or more   How many sweetened beverages each day? 0-1         3/19/2025   Exercise   Days per week of moderate/strenous exercise 3 days   Average minutes spent exercising at this level 40 min         3/19/2025   Social Factors   Frequency of gathering with friends or relatives Once a week   Worry food won't last until get money to buy more No   Food not last or not have enough money for food? No   Do you have housing? (Housing is defined as stable permanent housing and does not include staying ouside in a car, in a tent, in an abandoned building, in an overnight shelter, or couch-surfing.) Yes   Are you worried about losing your housing? No   Lack of transportation? No   Unable to get utilities (heat,electricity)? No         3/19/2025   Fall Risk   Fallen 2 or more times in the past year? No   Trouble with walking or balance? No          3/19/2025   Dental   Dentist two times every year? Yes           Today's PHQ-2 Score:       3/19/2025    11:44 AM   PHQ-2 ( 1999 Pf[employer])   PHQ-2 Score Incomplete           3/19/2025   Substance Use   Alcohol more than 3/day or more than 7/wk No   Do you use any other substances recreationally? No     Social History     Tobacco Use    Smoking status: Never    Smokeless tobacco: Never   Vaping Use    Vaping  "status: Never Used   Substance Use Topics    Alcohol use: Not Currently     Comment: occasional     Drug use: No           1/17/2025   LAST FHS-7 RESULTS   1st degree relative breast or ovarian cancer No   Any relative bilateral breast cancer No   Any male have breast cancer No   Any ONE woman have BOTH breast AND ovarian cancer No   Any woman with breast cancer before 50yrs No   2 or more relatives with breast AND/OR ovarian cancer No   2 or more relatives with breast AND/OR bowel cancer No                3/19/2025   STI Screening   New sexual partner(s) since last STI/HIV test? No     History of abnormal Pap smear: No - age 30- 64 PAP with HPV every 5 years recommended        Latest Ref Rng & Units 9/19/2023     3:16 PM 2/22/2019     9:03 AM 2/22/2019     8:43 AM   PAP / HPV   PAP  Negative for Intraepithelial Lesion or Malignancy (NILM)      PAP (Historical)    NIL    HPV 16 DNA Negative Negative  Negative     HPV 18 DNA Negative Negative  Negative     Other HR HPV Negative Negative  Negative       ASCVD Risk   The 10-year ASCVD risk score (Amelie PALMER, et al., 2019) is: 1.2%    Values used to calculate the score:      Age: 54 years      Sex: Female      Is Non- : No      Diabetic: No      Tobacco smoker: No      Systolic Blood Pressure: 104 mmHg      Is BP treated: No      HDL Cholesterol: 64 mg/dL      Total Cholesterol: 181 mg/dL           Reviewed and updated as needed this visit by Provider                             Objective    Exam  /66 (BP Location: Right arm, Patient Position: Sitting, Cuff Size: Adult Regular)   Pulse 60   Temp 97.3  F (36.3  C) (Temporal)   Resp 18   Ht 1.556 m (5' 1.25\")   Wt 73.6 kg (162 lb 3.2 oz)   LMP 03/20/2018 (Approximate)   SpO2 97%   BMI 30.40 kg/m     Estimated body mass index is 30.4 kg/m  as calculated from the following:    Height as of this encounter: 1.556 m (5' 1.25\").    Weight as of this encounter: 73.6 kg (162 lb 3.2 " oz).    Physical Exam  GENERAL: alert and no distress  EYES: Eyes grossly normal to inspection, and conjunctivae and sclerae normal  HENT: ear canals and TM's normal, nose and mouth without ulcers or lesions  NECK: no adenopathy, no asymmetry, masses, or scars  RESP: lungs clear to auscultation - no rales, rhonchi or wheezes  CV: regular rate and rhythm, normal S1 S2, no S3 or S4, no murmur, click or rub, no peripheral edema  ABDOMEN: soft, nontender, no hepatosplenomegaly, no masses   MS: no gross musculoskeletal defects noted, no edema  SKIN: no suspicious lesions or rashes  NEURO: Normal strength and tone, mentation intact and speech normal  PSYCH: mentation appears normal, affect normal/bright        Signed Electronically by: Deirdre E. Milligan, MD

## 2025-03-20 NOTE — PATIENT INSTRUCTIONS
"Colonoscopy due in 2028  -normal in 2018      Patient Education   Eating Healthy Foods: Care Instructions  With every meal, you can make healthy food choices. Try to eat a variety of fruits, vegetables, whole grains, lean proteins, and low-fat dairy products. This can help you get the right balance of nutrients, including vitamins and minerals. Small changes add up over time. You can start by adding one healthy food to your meals each day.    Try to make half your plate fruits and vegetables, one-fourth whole grains, and one-fourth lean proteins. Try including dairy with your meals.   Eat more fruits and vegetables. Try to have them with most meals and snacks.   Foods for healthy eating        Fruits   These can be fresh, frozen, canned, or dried.  Try to choose whole fruit rather than fruit juice.  Eat a variety of colors.        Vegetables   These can be fresh, frozen, canned, or dried.  Beans, peas, and lentils count too.        Whole grains   Choose whole-grain breads, cereals, and noodles.  Try brown rice.        Lean proteins   These can include lean meat, poultry, fish, and eggs.  You can also have tofu, beans, peas, lentils, nuts, and seeds.        Dairy   Try milk, yogurt, and cheese.  Choose low-fat or fat-free when you can.  If you need to, use lactose-free milk or fortified plant-based milk products, such as soy milk.        Water   Drink water when you're thirsty.  Limit sugar-sweetened drinks, including soda, fruit drinks, and sports drinks.  Where can you learn more?  Go to https://www.[employer].net/patiented  Enter T756 in the search box to learn more about \"Eating Healthy Foods: Care Instructions.\"  Current as of: October 7, 2024  Content Version: 14.4    4925-8820 [employer].   Care instructions adapted under license by your healthcare professional. If you have questions about a medical condition or this instruction, always ask your healthcare professional. [employer] " disclaims any warranty or liability for your use of this information.    Preventive Care Advice   This is general advice given by our system to help you stay healthy. However, your care team may have specific advice just for you. Please talk to your care team about your preventive care needs.  Nutrition  Eat 5 or more servings of fruits and vegetables each day.  Try wheat bread, brown rice and whole grain pasta (instead of white bread, rice, and pasta).  Get enough calcium and vitamin D. Check the label on foods and aim for 100% of the RDA (recommended daily allowance).  Lifestyle  Exercise at least 150 minutes each week  (30 minutes a day, 5 days a week).  Do muscle strengthening activities 2 days a week. These help control your weight and prevent disease.  No smoking.  Wear sunscreen to prevent skin cancer.  Have a dental exam and cleaning every 6 months.  Yearly exams  See your health care team every year to talk about:  Any changes in your health.  Any medicines your care team has prescribed.  Preventive care, family planning, and ways to prevent chronic diseases.  Shots (vaccines)   HPV shots (up to age 26), if you've never had them before.  Hepatitis B shots (up to age 59), if you've never had them before.  COVID-19 shot: Get this shot when it's due.  Flu shot: Get a flu shot every year.  Tetanus shot: Get a tetanus shot every 10 years.  Pneumococcal, hepatitis A, and RSV shots: Ask your care team if you need these based on your risk.  Shingles shot (for age 50 and up)  General health tests  Diabetes screening:  Starting at age 35, Get screened for diabetes at least every 3 years.  If you are younger than age 35, ask your care team if you should be screened for diabetes.  Cholesterol test: At age 39, start having a cholesterol test every 5 years, or more often if advised.  Bone density scan (DEXA): At age 50, ask your care team if you should have this scan for osteoporosis (brittle bones).  Hepatitis C: Get  tested at least once in your life.  STIs (sexually transmitted infections)  Before age 24: Ask your care team if you should be screened for STIs.  After age 24: Get screened for STIs if you're at risk. You are at risk for STIs (including HIV) if:  You are sexually active with more than one person.  You don't use condoms every time.  You or a partner was diagnosed with a sexually transmitted infection.  If you are at risk for HIV, ask about PrEP medicine to prevent HIV.  Get tested for HIV at least once in your life, whether you are at risk for HIV or not.  Cancer screening tests  Cervical cancer screening: If you have a cervix, begin getting regular cervical cancer screening tests starting at age 21.  Breast cancer scan (mammogram): If you've ever had breasts, begin having regular mammograms starting at age 40. This is a scan to check for breast cancer.  Colon cancer screening: It is important to start screening for colon cancer at age 45.  Have a colonoscopy test every 10 years (or more often if you're at risk) Or, ask your provider about stool tests like a FIT test every year or Cologuard test every 3 years.  To learn more about your testing options, visit:   .  For help making a decision, visit:   https://bit.ly/jn55623.  Prostate cancer screening test: If you have a prostate, ask your care team if a prostate cancer screening test (PSA) at age 55 is right for you.  Lung cancer screening: If you are a current or former smoker ages 50 to 80, ask your care team if ongoing lung cancer screenings are right for you.  For informational purposes only. Not to replace the advice of your health care provider. Copyright   2023 [employer]. All rights reserved. Clinically reviewed by the [facility] Transitions Program. [employer] 119819 - REV 01/24.

## 2025-04-09 ENCOUNTER — DOCUMENTATION ONLY (OUTPATIENT)
Dept: OTHER | Facility: CLINIC | Age: 55
End: 2025-04-09
Payer: COMMERCIAL

## (undated) DEVICE — SU DERMABOND MINI DHVM12

## (undated) DEVICE — LINEN HALF SHEET 5512

## (undated) DEVICE — SEAL SET MYOSURE ROD LENS SCOPE SINGLE USE 40-902

## (undated) DEVICE — ESU PENCIL W/HOLSTER E2350H

## (undated) DEVICE — Device

## (undated) DEVICE — ENDO POUCH GOLD 10MM ECATCH 173050G

## (undated) DEVICE — DRAPE UNDER BUTTOCK 89415

## (undated) DEVICE — GLOVE PROTEXIS BLUE W/NEU-THERA 8.0  2D73EB80

## (undated) DEVICE — ESU LIGASURE LAPAROSCOPIC BLUNT TIP SEALER 5MMX37CM LF1637

## (undated) DEVICE — SUCTION IRR STRYKERFLOW II W/TIP 250-070-520

## (undated) DEVICE — LINEN TOWEL PACK X10 5473

## (undated) DEVICE — PREP CHLORHEXIDINE 4% 4OZ (HIBICLENS) 57504

## (undated) DEVICE — PACK MINOR LITHOTOMY RIDGES

## (undated) DEVICE — SU VICRYL 0 UR-6 27" J603H

## (undated) DEVICE — SU VICRYL 4-0 PS-2 18" UND J496H

## (undated) DEVICE — BAG CLEAR TRASH 1.3M 39X33" P4040C

## (undated) DEVICE — DRAIN JACKSON PRATT RESERVOIR 100ML SU130-1305

## (undated) DEVICE — ENDO CANNULA 05MM VERSAONE UNIVERSAL UNVCA5STF

## (undated) DEVICE — SOL NACL 0.9% IRRIG 1000ML BOTTLE 2F7124

## (undated) DEVICE — ENDO TROCAR BLUNT 100MM W/THRD ANCHOR BLUNTPORT BPT12STS

## (undated) DEVICE — STPL ENDO GIA 12MM UNIV 030449

## (undated) DEVICE — ENDO TROCAR 05MM VERSAONE BLADELESS W/STD FIX CAN NONB5STF

## (undated) DEVICE — LINEN POUCH DBL 5427

## (undated) DEVICE — APPLICATOR COTTON TIP 6"X2 STERILE LF 6012

## (undated) DEVICE — DRAIN JACKSON PRATT 15FR ROUND SIL LF JP-2229

## (undated) DEVICE — SU ETHILON 2-0 PS 18" 585H

## (undated) DEVICE — GLOVE BIOGEL PI MICRO INDICATOR UNDERGLOVE SZ 6.5 48965

## (undated) DEVICE — ESU ELEC BLADE 2.75" COATED/INSULATED E1455

## (undated) DEVICE — KIT PROCEDURE FLUENT IN/OUT FLOWPAK TISS TRAP FLT-112S

## (undated) DEVICE — BASIN SET MINOR DISP

## (undated) DEVICE — SUCTION CANISTER MEDIVAC LINER 3000ML W/LID 65651-530

## (undated) DEVICE — LINEN DRAPE 54X72" 5467

## (undated) DEVICE — BLADE KNIFE SURG 11 371111

## (undated) DEVICE — ESU CORD MONOPOLAR 10'  E0510

## (undated) DEVICE — LINEN FULL SHEET 5511

## (undated) DEVICE — GLOVE PROTEXIS POWDER FREE 7.5 ORTHOPEDIC 2D73ET75

## (undated) DEVICE — SOL NACL 0.9% IRRIG 3000ML BAG 2B7477

## (undated) DEVICE — ESU GROUND PAD ADULT W/CORD E7507

## (undated) DEVICE — SOL NACL 0.9% INJ 1000ML BAG 2B1324X

## (undated) DEVICE — GOWN IMPERVIOUS ZONED XLG 9041

## (undated) RX ORDER — FENTANYL CITRATE 50 UG/ML
INJECTION, SOLUTION INTRAMUSCULAR; INTRAVENOUS
Status: DISPENSED
Start: 2018-04-03

## (undated) RX ORDER — FENTANYL CITRATE 50 UG/ML
INJECTION, SOLUTION INTRAMUSCULAR; INTRAVENOUS
Status: DISPENSED
Start: 2023-11-14

## (undated) RX ORDER — PROPOFOL 10 MG/ML
INJECTION, EMULSION INTRAVENOUS
Status: DISPENSED
Start: 2018-04-03

## (undated) RX ORDER — NEOSTIGMINE METHYLSULFATE 1 MG/ML
VIAL (ML) INJECTION
Status: DISPENSED
Start: 2018-04-03

## (undated) RX ORDER — BUPIVACAINE HYDROCHLORIDE 5 MG/ML
INJECTION, SOLUTION EPIDURAL; INTRACAUDAL
Status: DISPENSED
Start: 2018-04-03

## (undated) RX ORDER — ACETAMINOPHEN 325 MG/1
TABLET ORAL
Status: DISPENSED
Start: 2023-11-14

## (undated) RX ORDER — GLYCOPYRROLATE 0.2 MG/ML
INJECTION INTRAMUSCULAR; INTRAVENOUS
Status: DISPENSED
Start: 2018-04-03

## (undated) RX ORDER — SCOLOPAMINE TRANSDERMAL SYSTEM 1 MG/1
PATCH, EXTENDED RELEASE TRANSDERMAL
Status: DISPENSED
Start: 2023-11-14

## (undated) RX ORDER — ONDANSETRON 2 MG/ML
INJECTION INTRAMUSCULAR; INTRAVENOUS
Status: DISPENSED
Start: 2018-04-03

## (undated) RX ORDER — CEFAZOLIN SODIUM/WATER 2 G/20 ML
SYRINGE (ML) INTRAVENOUS
Status: DISPENSED
Start: 2023-11-14

## (undated) RX ORDER — DEXAMETHASONE SODIUM PHOSPHATE 4 MG/ML
INJECTION, SOLUTION INTRA-ARTICULAR; INTRALESIONAL; INTRAMUSCULAR; INTRAVENOUS; SOFT TISSUE
Status: DISPENSED
Start: 2018-04-03

## (undated) RX ORDER — LIDOCAINE HYDROCHLORIDE 10 MG/ML
INJECTION, SOLUTION EPIDURAL; INFILTRATION; INTRACAUDAL; PERINEURAL
Status: DISPENSED
Start: 2018-04-03

## (undated) RX ORDER — OXYCODONE HYDROCHLORIDE 5 MG/1
TABLET ORAL
Status: DISPENSED
Start: 2023-11-14

## (undated) RX ORDER — CEFAZOLIN SODIUM 2 G/100ML
INJECTION, SOLUTION INTRAVENOUS
Status: DISPENSED
Start: 2018-04-03